# Patient Record
Sex: FEMALE | Race: WHITE | NOT HISPANIC OR LATINO
[De-identification: names, ages, dates, MRNs, and addresses within clinical notes are randomized per-mention and may not be internally consistent; named-entity substitution may affect disease eponyms.]

---

## 2017-01-27 ENCOUNTER — RECORD ABSTRACTING (OUTPATIENT)
Age: 77
End: 2017-01-27

## 2017-01-27 DIAGNOSIS — Z98.890 OTHER SPECIFIED POSTPROCEDURAL STATES: ICD-10-CM

## 2017-01-27 DIAGNOSIS — Z84.0 FAMILY HISTORY OF DISEASES OF THE SKIN AND SUBCUTANEOUS TISSUE: ICD-10-CM

## 2017-01-27 DIAGNOSIS — L30.1 DYSHIDROSIS [POMPHOLYX]: ICD-10-CM

## 2017-01-27 DIAGNOSIS — Z78.9 OTHER SPECIFIED HEALTH STATUS: ICD-10-CM

## 2017-02-27 ENCOUNTER — APPOINTMENT (OUTPATIENT)
Dept: DERMATOLOGY | Facility: CLINIC | Age: 77
End: 2017-02-27

## 2017-03-15 ENCOUNTER — RECORD ABSTRACTING (OUTPATIENT)
Age: 77
End: 2017-03-15

## 2017-03-15 DIAGNOSIS — Z84.89 FAMILY HISTORY OF OTHER SPECIFIED CONDITIONS: ICD-10-CM

## 2017-03-15 DIAGNOSIS — Z80.8 FAMILY HISTORY OF MALIGNANT NEOPLASM OF OTHER ORGANS OR SYSTEMS: ICD-10-CM

## 2017-03-31 ENCOUNTER — APPOINTMENT (OUTPATIENT)
Dept: INTERNAL MEDICINE | Facility: CLINIC | Age: 77
End: 2017-03-31

## 2017-03-31 VITALS
WEIGHT: 160 LBS | OXYGEN SATURATION: 98 % | HEART RATE: 64 BPM | SYSTOLIC BLOOD PRESSURE: 132 MMHG | RESPIRATION RATE: 18 BRPM | BODY MASS INDEX: 24.25 KG/M2 | DIASTOLIC BLOOD PRESSURE: 76 MMHG | TEMPERATURE: 97.5 F | HEIGHT: 68 IN

## 2017-10-16 ENCOUNTER — OTHER (OUTPATIENT)
Age: 77
End: 2017-10-16

## 2017-10-20 ENCOUNTER — NON-APPOINTMENT (OUTPATIENT)
Age: 77
End: 2017-10-20

## 2017-10-20 ENCOUNTER — APPOINTMENT (OUTPATIENT)
Dept: INTERNAL MEDICINE | Facility: CLINIC | Age: 77
End: 2017-10-20
Payer: MEDICARE

## 2017-10-20 VITALS
RESPIRATION RATE: 16 BRPM | WEIGHT: 150 LBS | OXYGEN SATURATION: 96 % | HEART RATE: 60 BPM | HEIGHT: 68 IN | BODY MASS INDEX: 22.73 KG/M2 | TEMPERATURE: 98.5 F | SYSTOLIC BLOOD PRESSURE: 124 MMHG | DIASTOLIC BLOOD PRESSURE: 70 MMHG

## 2017-10-20 PROCEDURE — 94010 BREATHING CAPACITY TEST: CPT

## 2017-10-20 PROCEDURE — 99214 OFFICE O/P EST MOD 30 MIN: CPT | Mod: 25

## 2017-12-05 LAB
CHOLEST SERPL-MCNC: 210
GLUCOSE SERPL-MCNC: 110
HBA1C MFR BLD HPLC: 6.3
HDLC SERPL-MCNC: 75
LDLC SERPL CALC-MCNC: 124

## 2018-01-08 ENCOUNTER — OUTPATIENT (OUTPATIENT)
Dept: OUTPATIENT SERVICES | Facility: HOSPITAL | Age: 78
LOS: 1 days | Discharge: HOME | End: 2018-01-08

## 2018-01-08 VITALS
HEART RATE: 56 BPM | TEMPERATURE: 99 F | RESPIRATION RATE: 16 BRPM | HEIGHT: 68 IN | OXYGEN SATURATION: 100 % | WEIGHT: 158.29 LBS | DIASTOLIC BLOOD PRESSURE: 65 MMHG | SYSTOLIC BLOOD PRESSURE: 165 MMHG

## 2018-01-08 DIAGNOSIS — Z98.890 OTHER SPECIFIED POSTPROCEDURAL STATES: Chronic | ICD-10-CM

## 2018-01-08 DIAGNOSIS — M13.812 OTHER SPECIFIED ARTHRITIS, LEFT SHOULDER: ICD-10-CM

## 2018-01-08 DIAGNOSIS — Z41.9 ENCOUNTER FOR PROCEDURE FOR PURPOSES OTHER THAN REMEDYING HEALTH STATE, UNSPECIFIED: Chronic | ICD-10-CM

## 2018-01-08 DIAGNOSIS — Z01.818 ENCOUNTER FOR OTHER PREPROCEDURAL EXAMINATION: ICD-10-CM

## 2018-01-08 DIAGNOSIS — M75.42 IMPINGEMENT SYNDROME OF LEFT SHOULDER: ICD-10-CM

## 2018-01-08 DIAGNOSIS — Z90.89 ACQUIRED ABSENCE OF OTHER ORGANS: Chronic | ICD-10-CM

## 2018-01-08 LAB
ANION GAP SERPL CALC-SCNC: 6 MMOL/L — SIGNIFICANT CHANGE UP (ref 5–17)
APTT BLD: 30.4 SEC — SIGNIFICANT CHANGE UP (ref 27.5–37.4)
BASOPHILS # BLD AUTO: 0.1 K/UL — SIGNIFICANT CHANGE UP (ref 0–0.2)
BASOPHILS NFR BLD AUTO: 1.2 % — SIGNIFICANT CHANGE UP (ref 0–2)
BUN SERPL-MCNC: 18 MG/DL — SIGNIFICANT CHANGE UP (ref 7–23)
CALCIUM SERPL-MCNC: 9.3 MG/DL — SIGNIFICANT CHANGE UP (ref 8.5–10.1)
CHLORIDE SERPL-SCNC: 108 MMOL/L — SIGNIFICANT CHANGE UP (ref 96–108)
CO2 SERPL-SCNC: 28 MMOL/L — SIGNIFICANT CHANGE UP (ref 22–31)
CREAT SERPL-MCNC: 0.77 MG/DL — SIGNIFICANT CHANGE UP (ref 0.5–1.3)
EOSINOPHIL # BLD AUTO: 0.2 K/UL — SIGNIFICANT CHANGE UP (ref 0–0.5)
EOSINOPHIL NFR BLD AUTO: 2.3 % — SIGNIFICANT CHANGE UP (ref 0–6)
GLUCOSE SERPL-MCNC: 102 MG/DL — HIGH (ref 70–99)
HCT VFR BLD CALC: 42.7 % — SIGNIFICANT CHANGE UP (ref 34.5–45)
HGB BLD-MCNC: 13.7 G/DL — SIGNIFICANT CHANGE UP (ref 11.5–15.5)
INR BLD: 0.95 RATIO — SIGNIFICANT CHANGE UP (ref 0.88–1.16)
LYMPHOCYTES # BLD AUTO: 1.3 K/UL — SIGNIFICANT CHANGE UP (ref 1–3.3)
LYMPHOCYTES # BLD AUTO: 19.7 % — SIGNIFICANT CHANGE UP (ref 13–44)
MCHC RBC-ENTMCNC: 29.4 PG — SIGNIFICANT CHANGE UP (ref 27–34)
MCHC RBC-ENTMCNC: 32.2 GM/DL — SIGNIFICANT CHANGE UP (ref 32–36)
MCV RBC AUTO: 91.5 FL — SIGNIFICANT CHANGE UP (ref 80–100)
MONOCYTES # BLD AUTO: 0.4 K/UL — SIGNIFICANT CHANGE UP (ref 0–0.9)
MONOCYTES NFR BLD AUTO: 5.4 % — SIGNIFICANT CHANGE UP (ref 2–14)
NEUTROPHILS # BLD AUTO: 4.9 K/UL — SIGNIFICANT CHANGE UP (ref 1.8–7.4)
NEUTROPHILS NFR BLD AUTO: 71.4 % — SIGNIFICANT CHANGE UP (ref 43–77)
PLATELET # BLD AUTO: 319 K/UL — SIGNIFICANT CHANGE UP (ref 150–400)
POTASSIUM SERPL-MCNC: 4 MMOL/L — SIGNIFICANT CHANGE UP (ref 3.5–5.3)
POTASSIUM SERPL-SCNC: 4 MMOL/L — SIGNIFICANT CHANGE UP (ref 3.5–5.3)
PROTHROM AB SERPL-ACNC: 10.2 SEC — SIGNIFICANT CHANGE UP (ref 9.8–12.7)
RBC # BLD: 4.67 M/UL — SIGNIFICANT CHANGE UP (ref 3.8–5.2)
RBC # FLD: 13.6 % — SIGNIFICANT CHANGE UP (ref 10.3–14.5)
SODIUM SERPL-SCNC: 142 MMOL/L — SIGNIFICANT CHANGE UP (ref 135–145)
WBC # BLD: 6.9 K/UL — SIGNIFICANT CHANGE UP (ref 3.8–10.5)
WBC # FLD AUTO: 6.9 K/UL — SIGNIFICANT CHANGE UP (ref 3.8–10.5)

## 2018-01-08 NOTE — H&P PST ADULT - FAMILY HISTORY
Father  Still living? No  Family history of prostate cancer in father, Age at diagnosis: Age Unknown     Mother  Still living? No  Family history of glioblastoma, Age at diagnosis: Age Unknown     Sibling  Still living? Yes, Estimated age: Age Unknown  Family history of glioblastoma, Age at diagnosis: Age Unknown  Family history of coronary artery disease, Age at diagnosis: Age Unknown  Family history of diabetes mellitus (DM), Age at diagnosis: Age Unknown

## 2018-01-08 NOTE — H&P PST ADULT - ASSESSMENT
This is a 78 y/o F scheduled for Left shoulder arthroscoopy decomensation, exc. distal clavicle with Dr. Melchor Phelps III on 1/19/18    1. Labs as per surgeon  2. EKG on chart from 11/2017  3. Medical clearance with Dr. Lundberg 1/12/18  4. discussed EZ sponges, pre-op & day of procedure instructions This is a 76 y/o F scheduled for Left shoulder arthroscoopy decomensation, exc. distal clavicle with Dr. Melchor Phelps III on 1/19/18    1. Labs as per surgeon  2. EKG on chart from 11/2017  3. Medical clearance with Dr. Lundberg  (/65 today, will f/u with Dr. Lundberg friday 1/12/18 for repeat BP check w/ medical clearance)  4. discussed EZ sponges, pre-op & day of procedure instructions

## 2018-01-08 NOTE — H&P PST ADULT - PMH
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

## 2018-01-08 NOTE — H&P PST ADULT - HISTORY OF PRESENT ILLNESS
This is a 78 y/o F Left shoulder pain x 1 year, completed PT for several weeks which cause more pain then steroid injection x 2 last one 11/2017 with minimal relief. Denies CP, fever, SOB, dizziness, LE edema. This is a 78 y/o F with PMH arthritis, recurrent bronchitis, chronic rhinitis, eczema, glaucoma, congenital kidney anomaly, low back pain, urinary frequency, Left shoulder pain x 1 year. Report pain to be intermittent, worse with certain positional changes that she can not specify, describes pain as sharp and rates it at 6 out of 10 when its at the worst, completed PT for several weeks which worsened her pain, then had steroid injections x 2 (last being 11/2017) with minimal relief. Denies joint instability, joint swelling, CP, fever, SOB, dizziness, LE edema.

## 2018-01-09 ENCOUNTER — OTHER (OUTPATIENT)
Age: 78
End: 2018-01-09

## 2018-01-10 LAB
GLUCOSE SERPL-MCNC: 102
INR PPP: 0.95
PT BLD: 10.2

## 2018-01-12 ENCOUNTER — NON-APPOINTMENT (OUTPATIENT)
Age: 78
End: 2018-01-12

## 2018-01-12 ENCOUNTER — APPOINTMENT (OUTPATIENT)
Dept: INTERNAL MEDICINE | Facility: CLINIC | Age: 78
End: 2018-01-12
Payer: MEDICARE

## 2018-01-12 VITALS
HEART RATE: 60 BPM | WEIGHT: 157.98 LBS | HEIGHT: 68 IN | OXYGEN SATURATION: 97 % | DIASTOLIC BLOOD PRESSURE: 74 MMHG | TEMPERATURE: 98.9 F | RESPIRATION RATE: 16 BRPM | BODY MASS INDEX: 23.94 KG/M2 | SYSTOLIC BLOOD PRESSURE: 110 MMHG

## 2018-01-12 DIAGNOSIS — M26.602 LEFT TEMPOROMANDIBULAR JOINT DISORDER,: ICD-10-CM

## 2018-01-12 DIAGNOSIS — H52.4 PRESBYOPIA: ICD-10-CM

## 2018-01-12 DIAGNOSIS — J30.1 ALLERGIC RHINITIS DUE TO POLLEN: ICD-10-CM

## 2018-01-12 DIAGNOSIS — R79.89 OTHER SPECIFIED ABNORMAL FINDINGS OF BLOOD CHEMISTRY: ICD-10-CM

## 2018-01-12 DIAGNOSIS — I38 ENDOCARDITIS, VALVE UNSPECIFIED: ICD-10-CM

## 2018-01-12 DIAGNOSIS — Q63.1 LOBULATED, FUSED AND HORSESHOE KIDNEY: ICD-10-CM

## 2018-01-12 PROCEDURE — 94060 EVALUATION OF WHEEZING: CPT

## 2018-01-12 PROCEDURE — 99214 OFFICE O/P EST MOD 30 MIN: CPT | Mod: 25

## 2018-01-12 RX ORDER — TRAVOPROST 0.04 MG/ML
SOLUTION/ DROPS OPHTHALMIC
Refills: 0 | Status: COMPLETED | COMMUNITY
End: 2018-01-12

## 2018-01-12 RX ORDER — PNV NO.95/FERROUS FUM/FOLIC AC 28MG-0.8MG
100 TABLET ORAL
Refills: 0 | Status: COMPLETED | COMMUNITY
End: 2018-01-12

## 2018-01-12 RX ORDER — UBIDECARENONE/VIT E ACET 100MG-5
50 MCG CAPSULE ORAL
Refills: 0 | Status: COMPLETED | COMMUNITY
End: 2018-01-12

## 2018-01-18 ENCOUNTER — APPOINTMENT (OUTPATIENT)
Dept: INTERNAL MEDICINE | Facility: CLINIC | Age: 78
End: 2018-01-18
Payer: MEDICARE

## 2018-01-18 ENCOUNTER — RESULT CHARGE (OUTPATIENT)
Age: 78
End: 2018-01-18

## 2018-01-18 ENCOUNTER — NON-APPOINTMENT (OUTPATIENT)
Age: 78
End: 2018-01-18

## 2018-01-18 VITALS
DIASTOLIC BLOOD PRESSURE: 80 MMHG | WEIGHT: 154.98 LBS | HEIGHT: 68 IN | TEMPERATURE: 99.7 F | OXYGEN SATURATION: 97 % | BODY MASS INDEX: 23.49 KG/M2 | SYSTOLIC BLOOD PRESSURE: 130 MMHG | HEART RATE: 80 BPM | RESPIRATION RATE: 16 BRPM

## 2018-01-18 LAB
FLUAV SPEC QL CULT: NEGATIVE
FLUBV AG SPEC QL IA: NEGATIVE

## 2018-01-18 PROCEDURE — 87804 INFLUENZA ASSAY W/OPTIC: CPT | Mod: QW

## 2018-01-18 PROCEDURE — 94060 EVALUATION OF WHEEZING: CPT

## 2018-01-18 PROCEDURE — 99213 OFFICE O/P EST LOW 20 MIN: CPT | Mod: 25

## 2018-01-19 RX ORDER — OXYCODONE AND ACETAMINOPHEN 5; 325 MG/1; MG/1
2 TABLET ORAL EVERY 6 HOURS
Qty: 0 | Refills: 0 | Status: DISCONTINUED | OUTPATIENT
Start: 2018-03-23 | End: 2018-03-23

## 2018-01-19 RX ORDER — OXYCODONE AND ACETAMINOPHEN 5; 325 MG/1; MG/1
1 TABLET ORAL EVERY 4 HOURS
Qty: 0 | Refills: 0 | Status: DISCONTINUED | OUTPATIENT
Start: 2018-03-23 | End: 2018-03-23

## 2018-01-19 RX ORDER — IBUPROFEN 200 MG
400 TABLET ORAL EVERY 8 HOURS
Qty: 0 | Refills: 0 | Status: DISCONTINUED | OUTPATIENT
Start: 2018-03-23 | End: 2018-04-07

## 2018-01-19 NOTE — ASU DISCHARGE PLAN (ADULT/PEDIATRIC). - NOTIFY
Swelling that continues/Numbness, color, or temperature change to extremity/Pain not relieved by Medications/Fever greater than 101/Numbness, tingling/Bleeding that does not stop

## 2018-01-19 NOTE — ASU DISCHARGE PLAN (ADULT/PEDIATRIC). - MEDICATION SUMMARY - MEDICATIONS TO TAKE
I will START or STAY ON the medications listed below when I get home from the hospital:    loratadine 10 mg oral tablet  -- 1 tab(s) by mouth once a day  -- Indication: For home med    pseudoephedrine  -- 1 tab(s) by mouth once a day  -- Indication: For home med    dorzolamide-timolol 2.23%-0.68% ophthalmic solution  -- 1 drop(s) in each eye 2 times a day  -- Indication: For home med    Daily Multi oral tablet  -- 1 tab(s) by mouth once a day  -- Indication: For home med    Vitamin C  -- 1 cap(s) by mouth once a day  -- Indication: For home med    Vitamin B-12  -- 1 dose(s) by mouth once a day  -- Indication: For home med

## 2018-01-23 ENCOUNTER — MEDICATION RENEWAL (OUTPATIENT)
Age: 78
End: 2018-01-23

## 2018-02-01 ENCOUNTER — APPOINTMENT (OUTPATIENT)
Dept: INTERNAL MEDICINE | Facility: CLINIC | Age: 78
End: 2018-02-01
Payer: MEDICARE

## 2018-02-01 ENCOUNTER — NON-APPOINTMENT (OUTPATIENT)
Age: 78
End: 2018-02-01

## 2018-02-01 VITALS
RESPIRATION RATE: 18 BRPM | WEIGHT: 158 LBS | HEIGHT: 68 IN | OXYGEN SATURATION: 97 % | DIASTOLIC BLOOD PRESSURE: 78 MMHG | BODY MASS INDEX: 23.95 KG/M2 | TEMPERATURE: 98.7 F | HEART RATE: 70 BPM | SYSTOLIC BLOOD PRESSURE: 126 MMHG

## 2018-02-01 PROCEDURE — 99214 OFFICE O/P EST MOD 30 MIN: CPT | Mod: 25

## 2018-02-01 PROCEDURE — 94060 EVALUATION OF WHEEZING: CPT

## 2018-02-01 RX ORDER — CEFUROXIME AXETIL 500 MG/1
500 TABLET ORAL
Qty: 14 | Refills: 0 | Status: COMPLETED | COMMUNITY
Start: 2018-02-01 | End: 2018-02-08

## 2018-02-01 RX ORDER — PREDNISONE 20 MG/1
20 TABLET ORAL
Qty: 7 | Refills: 0 | Status: DISCONTINUED | COMMUNITY
Start: 2018-01-21 | End: 2018-02-01

## 2018-02-01 RX ORDER — AZITHROMYCIN DIHYDRATE 250 MG/1
250 TABLET, FILM COATED ORAL
Qty: 6 | Refills: 0 | Status: COMPLETED | COMMUNITY
Start: 2018-01-18 | End: 2018-02-01

## 2018-02-05 LAB
ALBUMIN SERPL ELPH-MCNC: 4 G/DL
ALP BLD-CCNC: 81 U/L
ALT SERPL-CCNC: 22 U/L
ANION GAP SERPL CALC-SCNC: 13 MMOL/L
AST SERPL-CCNC: 14 U/L
BASOPHILS # BLD AUTO: 0.02 K/UL
BASOPHILS NFR BLD AUTO: 0.3 %
BILIRUB SERPL-MCNC: <0.2 MG/DL
BUN SERPL-MCNC: 17 MG/DL
CALCIUM SERPL-MCNC: 9.5 MG/DL
CHLORIDE SERPL-SCNC: 102 MMOL/L
CO2 SERPL-SCNC: 24 MMOL/L
CREAT SERPL-MCNC: 0.92 MG/DL
EOSINOPHIL # BLD AUTO: 0.11 K/UL
EOSINOPHIL NFR BLD AUTO: 1.4 %
GLUCOSE SERPL-MCNC: 128 MG/DL
HCT VFR BLD CALC: 39.4 %
HGB BLD-MCNC: 12.3 G/DL
IMM GRANULOCYTES NFR BLD AUTO: 0.5 %
LYMPHOCYTES # BLD AUTO: 1.86 K/UL
LYMPHOCYTES NFR BLD AUTO: 23.7 %
MAN DIFF?: NORMAL
MCHC RBC-ENTMCNC: 28.6 PG
MCHC RBC-ENTMCNC: 31.2 GM/DL
MCV RBC AUTO: 91.6 FL
MONOCYTES # BLD AUTO: 0.68 K/UL
MONOCYTES NFR BLD AUTO: 8.7 %
NEUTROPHILS # BLD AUTO: 5.14 K/UL
NEUTROPHILS NFR BLD AUTO: 65.4 %
PLATELET # BLD AUTO: 357 K/UL
POTASSIUM SERPL-SCNC: 4.4 MMOL/L
PROT SERPL-MCNC: 6.5 G/DL
RBC # BLD: 4.3 M/UL
RBC # FLD: 14.8 %
SODIUM SERPL-SCNC: 139 MMOL/L
WBC # FLD AUTO: 7.85 K/UL

## 2018-03-12 ENCOUNTER — OUTPATIENT (OUTPATIENT)
Dept: OUTPATIENT SERVICES | Facility: HOSPITAL | Age: 78
LOS: 1 days | Discharge: HOME | End: 2018-03-12
Payer: MEDICARE

## 2018-03-12 VITALS
OXYGEN SATURATION: 100 % | WEIGHT: 156.97 LBS | TEMPERATURE: 98 F | HEIGHT: 67 IN | SYSTOLIC BLOOD PRESSURE: 151 MMHG | HEART RATE: 59 BPM | RESPIRATION RATE: 14 BRPM | DIASTOLIC BLOOD PRESSURE: 73 MMHG

## 2018-03-12 DIAGNOSIS — M94.212 CHONDROMALACIA, LEFT SHOULDER: ICD-10-CM

## 2018-03-12 DIAGNOSIS — Z90.89 ACQUIRED ABSENCE OF OTHER ORGANS: Chronic | ICD-10-CM

## 2018-03-12 DIAGNOSIS — M13.812 OTHER SPECIFIED ARTHRITIS, LEFT SHOULDER: ICD-10-CM

## 2018-03-12 DIAGNOSIS — M75.42 IMPINGEMENT SYNDROME OF LEFT SHOULDER: ICD-10-CM

## 2018-03-12 DIAGNOSIS — M75.82 OTHER SHOULDER LESIONS, LEFT SHOULDER: ICD-10-CM

## 2018-03-12 DIAGNOSIS — M19.012 PRIMARY OSTEOARTHRITIS, LEFT SHOULDER: ICD-10-CM

## 2018-03-12 DIAGNOSIS — Z98.890 OTHER SPECIFIED POSTPROCEDURAL STATES: Chronic | ICD-10-CM

## 2018-03-12 DIAGNOSIS — E78.5 HYPERLIPIDEMIA, UNSPECIFIED: ICD-10-CM

## 2018-03-12 DIAGNOSIS — L30.9 DERMATITIS, UNSPECIFIED: ICD-10-CM

## 2018-03-12 DIAGNOSIS — Z41.9 ENCOUNTER FOR PROCEDURE FOR PURPOSES OTHER THAN REMEDYING HEALTH STATE, UNSPECIFIED: Chronic | ICD-10-CM

## 2018-03-12 DIAGNOSIS — Z01.818 ENCOUNTER FOR OTHER PREPROCEDURAL EXAMINATION: ICD-10-CM

## 2018-03-12 DIAGNOSIS — J30.9 ALLERGIC RHINITIS, UNSPECIFIED: ICD-10-CM

## 2018-03-12 LAB
ANION GAP SERPL CALC-SCNC: 7 MMOL/L — SIGNIFICANT CHANGE UP (ref 5–17)
APTT BLD: 30.1 SEC — SIGNIFICANT CHANGE UP (ref 27.5–37.4)
BASOPHILS # BLD AUTO: 0.06 K/UL — SIGNIFICANT CHANGE UP (ref 0–0.2)
BASOPHILS NFR BLD AUTO: 1 % — SIGNIFICANT CHANGE UP (ref 0–2)
BUN SERPL-MCNC: 20 MG/DL — SIGNIFICANT CHANGE UP (ref 7–23)
CALCIUM SERPL-MCNC: 9.3 MG/DL — SIGNIFICANT CHANGE UP (ref 8.5–10.1)
CHLORIDE SERPL-SCNC: 106 MMOL/L — SIGNIFICANT CHANGE UP (ref 96–108)
CO2 SERPL-SCNC: 27 MMOL/L — SIGNIFICANT CHANGE UP (ref 22–31)
CREAT SERPL-MCNC: 1 MG/DL — SIGNIFICANT CHANGE UP (ref 0.5–1.3)
EOSINOPHIL # BLD AUTO: 0.12 K/UL — SIGNIFICANT CHANGE UP (ref 0–0.5)
EOSINOPHIL NFR BLD AUTO: 1.9 % — SIGNIFICANT CHANGE UP (ref 0–6)
GLUCOSE SERPL-MCNC: 100 MG/DL — HIGH (ref 70–99)
HCT VFR BLD CALC: 42.1 % — SIGNIFICANT CHANGE UP (ref 34.5–45)
HGB BLD-MCNC: 13.5 G/DL — SIGNIFICANT CHANGE UP (ref 11.5–15.5)
IMM GRANULOCYTES NFR BLD AUTO: 0.3 % — SIGNIFICANT CHANGE UP (ref 0–1.5)
INR BLD: 1 RATIO — SIGNIFICANT CHANGE UP (ref 0.88–1.16)
LYMPHOCYTES # BLD AUTO: 1.24 K/UL — SIGNIFICANT CHANGE UP (ref 1–3.3)
LYMPHOCYTES # BLD AUTO: 20 % — SIGNIFICANT CHANGE UP (ref 13–44)
MCHC RBC-ENTMCNC: 29.2 PG — SIGNIFICANT CHANGE UP (ref 27–34)
MCHC RBC-ENTMCNC: 32.1 GM/DL — SIGNIFICANT CHANGE UP (ref 32–36)
MCV RBC AUTO: 91.1 FL — SIGNIFICANT CHANGE UP (ref 80–100)
MONOCYTES # BLD AUTO: 0.39 K/UL — SIGNIFICANT CHANGE UP (ref 0–0.9)
MONOCYTES NFR BLD AUTO: 6.3 % — SIGNIFICANT CHANGE UP (ref 2–14)
NEUTROPHILS # BLD AUTO: 4.38 K/UL — SIGNIFICANT CHANGE UP (ref 1.8–7.4)
NEUTROPHILS NFR BLD AUTO: 70.5 % — SIGNIFICANT CHANGE UP (ref 43–77)
PLATELET # BLD AUTO: 291 K/UL — SIGNIFICANT CHANGE UP (ref 150–400)
POTASSIUM SERPL-MCNC: 4.7 MMOL/L — SIGNIFICANT CHANGE UP (ref 3.5–5.3)
POTASSIUM SERPL-SCNC: 4.7 MMOL/L — SIGNIFICANT CHANGE UP (ref 3.5–5.3)
PROTHROM AB SERPL-ACNC: 10.8 SEC — SIGNIFICANT CHANGE UP (ref 9.8–12.7)
RBC # BLD: 4.62 M/UL — SIGNIFICANT CHANGE UP (ref 3.8–5.2)
RBC # FLD: 14.7 % — HIGH (ref 10.3–14.5)
SODIUM SERPL-SCNC: 140 MMOL/L — SIGNIFICANT CHANGE UP (ref 135–145)
WBC # BLD: 6.21 K/UL — SIGNIFICANT CHANGE UP (ref 3.8–10.5)
WBC # FLD AUTO: 6.21 K/UL — SIGNIFICANT CHANGE UP (ref 3.8–10.5)

## 2018-03-12 PROCEDURE — 93010 ELECTROCARDIOGRAM REPORT: CPT

## 2018-03-12 NOTE — H&P PST ADULT - PSH
Elective surgery  "vaginal tissue biopsy" 2000  H/O colonoscopy  last done 2008?  History of tonsillectomy  as a child

## 2018-03-12 NOTE — H&P PST ADULT - ASSESSMENT
yo scheduled for   with    on     1. Labs as per surgeon  2. EKG  3. Medical clearance with  4. discussed EZ sponges & day of procedure instructions 76 yo female scheduled for left shoulder arthroscopy with Dr. Phelps on 3/23/18    1. Labs as per surgeon  2. EKG  3. Medical clearance with PCP Dr Peterson  4. discussed EZ sponges & day of procedure instructions

## 2018-03-12 NOTE — H&P PST ADULT - PMH
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

## 2018-03-12 NOTE — H&P PST ADULT - HISTORY OF PRESENT ILLNESS
76 yo female presents to PST. Reports coming in for PST Jan 2018 for left shoulder surgery but surgery was cancelled due to "chest congestion". Denies fevers, cough or chest pain at this time. c/o left shoulder pain with "certain movements". Reports left shoulder pain 8/10 at times. Denies taking pain medication.

## 2018-03-16 ENCOUNTER — APPOINTMENT (OUTPATIENT)
Dept: INTERNAL MEDICINE | Facility: CLINIC | Age: 78
End: 2018-03-16
Payer: MEDICARE

## 2018-03-16 VITALS
OXYGEN SATURATION: 98 % | RESPIRATION RATE: 16 BRPM | BODY MASS INDEX: 23.95 KG/M2 | HEART RATE: 66 BPM | HEIGHT: 68 IN | SYSTOLIC BLOOD PRESSURE: 108 MMHG | DIASTOLIC BLOOD PRESSURE: 66 MMHG | WEIGHT: 158 LBS | TEMPERATURE: 97.4 F

## 2018-03-16 DIAGNOSIS — R05 COUGH: ICD-10-CM

## 2018-03-16 DIAGNOSIS — J40 BRONCHITIS, NOT SPECIFIED AS ACUTE OR CHRONIC: ICD-10-CM

## 2018-03-16 DIAGNOSIS — R41.3 OTHER AMNESIA: ICD-10-CM

## 2018-03-16 DIAGNOSIS — J45.20 MILD INTERMITTENT ASTHMA, UNCOMPLICATED: ICD-10-CM

## 2018-03-16 DIAGNOSIS — J06.9 ACUTE UPPER RESPIRATORY INFECTION, UNSPECIFIED: ICD-10-CM

## 2018-03-16 PROCEDURE — 99214 OFFICE O/P EST MOD 30 MIN: CPT | Mod: 25

## 2018-03-16 PROCEDURE — 94060 EVALUATION OF WHEEZING: CPT

## 2018-03-16 RX ORDER — METHYLPREDNISOLONE 4 MG/1
4 TABLET ORAL
Qty: 1 | Refills: 0 | Status: COMPLETED | COMMUNITY
Start: 2018-02-01 | End: 2018-03-16

## 2018-03-16 RX ORDER — CODEINE PHOSPHATE AND GUAIFENESIN 10; 100 MG/5ML; MG/5ML
100-10 LIQUID ORAL
Qty: 240 | Refills: 0 | Status: COMPLETED | COMMUNITY
Start: 2018-01-23 | End: 2018-03-16

## 2018-03-16 RX ORDER — LORATADINE 10 MG/1
TABLET ORAL
Refills: 0 | Status: COMPLETED | COMMUNITY
End: 2018-03-16

## 2018-03-16 RX ORDER — BENZONATATE 100 MG/1
100 CAPSULE ORAL
Qty: 60 | Refills: 0 | Status: COMPLETED | COMMUNITY
Start: 2018-01-18 | End: 2018-03-16

## 2018-03-22 RX ORDER — OXYCODONE HYDROCHLORIDE 5 MG/1
10 TABLET ORAL EVERY 6 HOURS
Qty: 0 | Refills: 0 | Status: DISCONTINUED | OUTPATIENT
Start: 2018-03-23 | End: 2018-03-23

## 2018-03-22 RX ORDER — SODIUM CHLORIDE 9 MG/ML
1000 INJECTION, SOLUTION INTRAVENOUS
Qty: 0 | Refills: 0 | Status: DISCONTINUED | OUTPATIENT
Start: 2018-03-23 | End: 2018-04-07

## 2018-03-22 RX ORDER — FENTANYL CITRATE 50 UG/ML
50 INJECTION INTRAVENOUS
Qty: 0 | Refills: 0 | Status: DISCONTINUED | OUTPATIENT
Start: 2018-03-23 | End: 2018-03-23

## 2018-03-22 RX ORDER — OXYCODONE HYDROCHLORIDE 5 MG/1
5 TABLET ORAL EVERY 4 HOURS
Qty: 0 | Refills: 0 | Status: DISCONTINUED | OUTPATIENT
Start: 2018-03-23 | End: 2018-03-23

## 2018-03-22 RX ORDER — ONDANSETRON 8 MG/1
4 TABLET, FILM COATED ORAL ONCE
Qty: 0 | Refills: 0 | Status: DISCONTINUED | OUTPATIENT
Start: 2018-03-23 | End: 2018-04-07

## 2018-03-23 ENCOUNTER — RESULT REVIEW (OUTPATIENT)
Age: 78
End: 2018-03-23

## 2018-03-23 ENCOUNTER — OUTPATIENT (OUTPATIENT)
Dept: OUTPATIENT SERVICES | Facility: HOSPITAL | Age: 78
LOS: 1 days | Discharge: HOME | End: 2018-03-23
Payer: MEDICARE

## 2018-03-23 VITALS
HEART RATE: 63 BPM | WEIGHT: 156.09 LBS | HEIGHT: 68 IN | RESPIRATION RATE: 16 BRPM | OXYGEN SATURATION: 99 % | DIASTOLIC BLOOD PRESSURE: 77 MMHG | SYSTOLIC BLOOD PRESSURE: 150 MMHG | TEMPERATURE: 99 F

## 2018-03-23 VITALS
RESPIRATION RATE: 16 BRPM | OXYGEN SATURATION: 99 % | SYSTOLIC BLOOD PRESSURE: 147 MMHG | DIASTOLIC BLOOD PRESSURE: 79 MMHG | HEART RATE: 73 BPM | TEMPERATURE: 98 F

## 2018-03-23 DIAGNOSIS — Z90.89 ACQUIRED ABSENCE OF OTHER ORGANS: Chronic | ICD-10-CM

## 2018-03-23 DIAGNOSIS — Z98.890 OTHER SPECIFIED POSTPROCEDURAL STATES: Chronic | ICD-10-CM

## 2018-03-23 DIAGNOSIS — Z41.9 ENCOUNTER FOR PROCEDURE FOR PURPOSES OTHER THAN REMEDYING HEALTH STATE, UNSPECIFIED: Chronic | ICD-10-CM

## 2018-03-23 PROCEDURE — 88304 TISSUE EXAM BY PATHOLOGIST: CPT | Mod: 26

## 2018-03-23 NOTE — BRIEF OPERATIVE NOTE - ELECTIVE PROCEDURE
Verified Results  (1) PT WITH INR [id number] 10:32AM Dino Starling Order Number: [id number]     Test Name Result Flag Reference   INR 2 56 H 0 86-1 16   PT 27 8 seconds H 12 1-14 4 Yes

## 2018-03-23 NOTE — BRIEF OPERATIVE NOTE - PROCEDURE
<<-----Click on this checkbox to enter Procedure Subacromial decompression  03/23/2018  left, with acromioplasty and shay  Active  [id number]

## 2018-03-27 LAB — SURGICAL PATHOLOGY FINAL REPORT - CH: SIGNIFICANT CHANGE UP

## 2018-03-28 DIAGNOSIS — M54.5 LOW BACK PAIN: ICD-10-CM

## 2018-03-28 DIAGNOSIS — Z83.3 FAMILY HISTORY OF DIABETES MELLITUS: ICD-10-CM

## 2018-03-28 DIAGNOSIS — R35.0 FREQUENCY OF MICTURITION: ICD-10-CM

## 2018-03-28 DIAGNOSIS — L30.9 DERMATITIS, UNSPECIFIED: ICD-10-CM

## 2018-03-28 DIAGNOSIS — M75.42 IMPINGEMENT SYNDROME OF LEFT SHOULDER: ICD-10-CM

## 2018-03-28 DIAGNOSIS — E78.5 HYPERLIPIDEMIA, UNSPECIFIED: ICD-10-CM

## 2018-03-28 DIAGNOSIS — Z82.49 FAMILY HISTORY OF ISCHEMIC HEART DISEASE AND OTHER DISEASES OF THE CIRCULATORY SYSTEM: ICD-10-CM

## 2018-03-28 DIAGNOSIS — M19.012 PRIMARY OSTEOARTHRITIS, LEFT SHOULDER: ICD-10-CM

## 2018-03-28 DIAGNOSIS — J30.9 ALLERGIC RHINITIS, UNSPECIFIED: ICD-10-CM

## 2018-03-28 DIAGNOSIS — M75.82 OTHER SHOULDER LESIONS, LEFT SHOULDER: ICD-10-CM

## 2018-03-28 DIAGNOSIS — M94.212 CHONDROMALACIA, LEFT SHOULDER: ICD-10-CM

## 2018-03-28 DIAGNOSIS — H40.9 UNSPECIFIED GLAUCOMA: ICD-10-CM

## 2018-03-28 DIAGNOSIS — N39.3 STRESS INCONTINENCE (FEMALE) (MALE): ICD-10-CM

## 2018-05-04 ENCOUNTER — APPOINTMENT (OUTPATIENT)
Dept: INTERNAL MEDICINE | Facility: CLINIC | Age: 78
End: 2018-05-04
Payer: MEDICARE

## 2018-05-04 ENCOUNTER — NON-APPOINTMENT (OUTPATIENT)
Age: 78
End: 2018-05-04

## 2018-05-04 VITALS
TEMPERATURE: 97.7 F | DIASTOLIC BLOOD PRESSURE: 76 MMHG | SYSTOLIC BLOOD PRESSURE: 132 MMHG | HEIGHT: 68 IN | RESPIRATION RATE: 18 BRPM | OXYGEN SATURATION: 97 % | BODY MASS INDEX: 23.64 KG/M2 | HEART RATE: 60 BPM | WEIGHT: 156 LBS

## 2018-05-04 PROCEDURE — 99214 OFFICE O/P EST MOD 30 MIN: CPT | Mod: 25

## 2018-05-04 PROCEDURE — 94010 BREATHING CAPACITY TEST: CPT

## 2018-07-16 PROBLEM — Q63.9 CONGENITAL MALFORMATION OF KIDNEY, UNSPECIFIED: Chronic | Status: INACTIVE | Noted: 2018-01-08 | Resolved: 2018-03-12

## 2018-07-16 PROBLEM — J40 BRONCHITIS, NOT SPECIFIED AS ACUTE OR CHRONIC: Chronic | Status: INACTIVE | Noted: 2018-01-08 | Resolved: 2018-03-12

## 2018-10-22 PROBLEM — M54.5 LOW BACK PAIN: Chronic | Status: ACTIVE | Noted: 2018-01-08

## 2018-10-22 PROBLEM — L30.9 DERMATITIS, UNSPECIFIED: Chronic | Status: ACTIVE | Noted: 2018-01-08

## 2018-10-22 PROBLEM — T80.40XA: Chronic | Status: ACTIVE | Noted: 2018-01-08

## 2018-10-22 PROBLEM — M25.512 PAIN IN LEFT SHOULDER: Chronic | Status: ACTIVE | Noted: 2018-03-12

## 2018-10-22 PROBLEM — J31.0 CHRONIC RHINITIS: Chronic | Status: ACTIVE | Noted: 2018-01-08

## 2018-10-22 PROBLEM — N39.3 STRESS INCONTINENCE (FEMALE) (MALE): Chronic | Status: ACTIVE | Noted: 2018-03-12

## 2018-10-22 PROBLEM — M19.90 UNSPECIFIED OSTEOARTHRITIS, UNSPECIFIED SITE: Chronic | Status: ACTIVE | Noted: 2018-01-08

## 2018-10-22 PROBLEM — H40.9 UNSPECIFIED GLAUCOMA: Chronic | Status: ACTIVE | Noted: 2018-01-08

## 2018-10-22 PROBLEM — R35.0 FREQUENCY OF MICTURITION: Chronic | Status: ACTIVE | Noted: 2018-01-08

## 2018-11-09 ENCOUNTER — APPOINTMENT (OUTPATIENT)
Dept: INTERNAL MEDICINE | Facility: CLINIC | Age: 78
End: 2018-11-09

## 2018-11-09 ENCOUNTER — APPOINTMENT (OUTPATIENT)
Dept: INTERNAL MEDICINE | Facility: CLINIC | Age: 78
End: 2018-11-09
Payer: MEDICARE

## 2018-11-09 VITALS
BODY MASS INDEX: 23.49 KG/M2 | HEIGHT: 68 IN | SYSTOLIC BLOOD PRESSURE: 134 MMHG | DIASTOLIC BLOOD PRESSURE: 82 MMHG | OXYGEN SATURATION: 97 % | WEIGHT: 155 LBS | TEMPERATURE: 97.9 F | RESPIRATION RATE: 16 BRPM | HEART RATE: 60 BPM

## 2018-11-09 DIAGNOSIS — H40.9 UNSPECIFIED GLAUCOMA: ICD-10-CM

## 2018-11-09 PROCEDURE — G0444 DEPRESSION SCREEN ANNUAL: CPT | Mod: 59

## 2018-11-09 PROCEDURE — 99214 OFFICE O/P EST MOD 30 MIN: CPT

## 2018-11-09 PROCEDURE — G0442 ANNUAL ALCOHOL SCREEN 15 MIN: CPT | Mod: 59

## 2018-11-09 NOTE — ASSESSMENT
[FreeTextEntry1] : #1 consider mild low back strain. Patient will was instructed on stretches and low back exercises. Return if not improving and resolving.\par \par #2 #2 seasonal allergies. loratidine prn.\par \par #3 history of borderline blood pressure readings. SHADY diet. recheck in future.\par \par Received flu vac for this season, 10/2018.\par \par RV 6 months.

## 2018-11-09 NOTE — HEALTH RISK ASSESSMENT
[] : Yes [0] : 2) Feeling down, depressed, or hopeless: Not at all (0) [[id number]] : 0 [[id number]] : 1 [MammogramComments] : to see gyn [PapSmearComments] : to see gyn [ColonoscopyDate] : 06/2011 [ColonoscopyComments] : to see GI

## 2018-11-09 NOTE — COUNSELING
[Healthy eating counseling provided] : healthy eating [Low Salt Diet] : Low salt diet [ - Annual Alcohol Misuse Screening] : Annual Alcohol Misuse Screening [ - Annual Depression Screening] : Annual Depression Screening

## 2018-11-09 NOTE — HISTORY OF PRESENT ILLNESS
[FreeTextEntry1] : RV 6 mos. [de-identified] : This is a pleasant 73-year-old female who returns for a following appointment. 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  she has some oocas mild right lower back pain in the lower right paralumbar area.\par \par She has received a flu vaccination for this season in October.\par \par She is a past history of borderline blood pressure readings and her blood pressure today here is 134/82.

## 2018-11-09 NOTE — PHYSICAL EXAM
[No Acute Distress] : no acute distress [Well Nourished] : well nourished [Well Developed] : well developed [Well-Appearing] : well-appearing [Normal Sclera/Conjunctiva] : normal sclera/conjunctiva [PERRL] : pupils equal round and reactive to light [EOMI] : extraocular movements intact [Normal Outer Ear/Nose] : the outer ears and nose were normal in appearance [Normal Oropharynx] : the oropharynx was normal [No JVD] : no jugular venous distention [Supple] : supple [No Lymphadenopathy] : no lymphadenopathy [Thyroid Normal, No Nodules] : the thyroid was normal and there were no nodules present [No Respiratory Distress] : no respiratory distress  [Clear to Auscultation] : lungs were clear to auscultation bilaterally [No Accessory Muscle Use] : no accessory muscle use [Normal Rate] : normal rate  [Regular Rhythm] : with a regular rhythm [Normal S1, S2] : normal S1 and S2 [No Edema] : there was no peripheral edema [No Extremity Clubbing/Cyanosis] : no extremity clubbing/cyanosis [Soft] : abdomen soft [Non Tender] : non-tender [Non-distended] : non-distended [No Masses] : no abdominal mass palpated [No HSM] : no HSM [Normal Bowel Sounds] : normal bowel sounds [Normal Anterior Cervical Nodes] : no anterior cervical lymphadenopathy [No Spinal Tenderness] : no spinal tenderness [No Rash] : no rash [Normal Gait] : normal gait [No Focal Deficits] : no focal deficits [Normal Affect] : the affect was normal [Normal Insight/Judgement] : insight and judgment were intact [de-identified] : mild inc tone paral sinal muscles

## 2019-01-20 DIAGNOSIS — Z86.2 PERSONAL HISTORY OF DISEASES OF THE BLOOD AND BLOOD-FORMING ORGANS AND CERTAIN DISORDERS INVOLVING THE IMMUNE MECHANISM: ICD-10-CM

## 2019-03-01 PROBLEM — Z86.2 HISTORY OF ANEMIA: Status: RESOLVED | Noted: 2019-03-01 | Resolved: 2019-03-01

## 2019-05-10 ENCOUNTER — APPOINTMENT (OUTPATIENT)
Dept: INTERNAL MEDICINE | Facility: CLINIC | Age: 79
End: 2019-05-10

## 2019-05-20 LAB — HBA1C MFR BLD HPLC: 6.3

## 2019-05-23 ENCOUNTER — APPOINTMENT (OUTPATIENT)
Dept: INTERNAL MEDICINE | Facility: CLINIC | Age: 79
End: 2019-05-23
Payer: MEDICARE

## 2019-05-23 VITALS
SYSTOLIC BLOOD PRESSURE: 154 MMHG | BODY MASS INDEX: 23.95 KG/M2 | OXYGEN SATURATION: 97 % | HEIGHT: 68 IN | TEMPERATURE: 97.8 F | RESPIRATION RATE: 18 BRPM | WEIGHT: 158 LBS | DIASTOLIC BLOOD PRESSURE: 80 MMHG | HEART RATE: 80 BPM

## 2019-05-23 VITALS — DIASTOLIC BLOOD PRESSURE: 84 MMHG | SYSTOLIC BLOOD PRESSURE: 154 MMHG

## 2019-05-23 PROCEDURE — 99214 OFFICE O/P EST MOD 30 MIN: CPT

## 2019-05-23 RX ORDER — TRIAMCINOLONE ACETONIDE 40 MG/ML
INJECTION, SUSPENSION INTRA-ARTICULAR; INTRAMUSCULAR
Refills: 0 | Status: DISCONTINUED | COMMUNITY
End: 2019-05-23

## 2019-05-23 RX ORDER — LIDOCAINE HYDROCHLORIDE 40 MG/ML
SOLUTION TOPICAL
Refills: 0 | Status: DISCONTINUED | COMMUNITY
End: 2019-05-23

## 2019-05-23 NOTE — HISTORY OF PRESENT ILLNESS
[FreeTextEntry1] : FU HLD/AR/ [de-identified] : She has been feeling somewhat stressed caring for her ill Step-Mother who is currently requiring 24 hr care.  She finds her eating habits are erratic due to her schedule.  Ice cream is her downfall.  She looks for comfort foods and notes few lb weight gain.  \par Her allergies have been well controlled with sudafed and loratidine qd.  She does have some PASCAL when walking on hills.  No chest pain.    \par

## 2019-05-23 NOTE — COUNSELING
[Weight management counseling provided] : Weight management [Healthy eating counseling provided] : healthy eating [Activity counseling provided] : activity [Low Salt Diet] : Low salt diet [___ min/wk activity recommended] : [unfilled] min/wk activity recommended

## 2019-05-23 NOTE — ASSESSMENT
[FreeTextEntry1] : \par Patient will discontinue Pseudoephedrine.  Start fluticasone NS 2 inh qd.  Continue Loratidine\par \par Long discussion regarding dx and treatment of HTN including lifestyle modifications with low Na diet, weight loss and exercise.  \par \par We had an extensive discussion regarding pre-diabetes and  Cardiovascular risks including statin therapy and importance of lifestyle modifications with a healthy diet and structured exercise regimen with goal >150 minutes most days of week. Risks and benefits of statins thoroughly reviewed. Pt declines use of Statin.  \par \par FU with  for Carotid Sono and Echo.  \par \par Discussed Shingrix vaccination.  Outlined benefits and risks and current recommendation.  \par RX provided for administration at local pharmacy.\par \par \par FU check BP 3 months.  \par \par

## 2019-05-23 NOTE — PHYSICAL EXAM

## 2019-07-16 ENCOUNTER — EMERGENCY (EMERGENCY)
Facility: HOSPITAL | Age: 79
LOS: 0 days | Discharge: HOME | End: 2019-07-16
Attending: EMERGENCY MEDICINE | Admitting: EMERGENCY MEDICINE
Payer: MEDICARE

## 2019-07-16 VITALS
WEIGHT: 154.98 LBS | HEIGHT: 68 IN | TEMPERATURE: 98 F | HEART RATE: 61 BPM | SYSTOLIC BLOOD PRESSURE: 177 MMHG | DIASTOLIC BLOOD PRESSURE: 94 MMHG | OXYGEN SATURATION: 97 % | RESPIRATION RATE: 18 BRPM

## 2019-07-16 DIAGNOSIS — R30.0 DYSURIA: ICD-10-CM

## 2019-07-16 DIAGNOSIS — Z41.9 ENCOUNTER FOR PROCEDURE FOR PURPOSES OTHER THAN REMEDYING HEALTH STATE, UNSPECIFIED: Chronic | ICD-10-CM

## 2019-07-16 DIAGNOSIS — N39.0 URINARY TRACT INFECTION, SITE NOT SPECIFIED: ICD-10-CM

## 2019-07-16 DIAGNOSIS — J31.0 CHRONIC RHINITIS: ICD-10-CM

## 2019-07-16 DIAGNOSIS — Z90.89 ACQUIRED ABSENCE OF OTHER ORGANS: Chronic | ICD-10-CM

## 2019-07-16 DIAGNOSIS — Z79.899 OTHER LONG TERM (CURRENT) DRUG THERAPY: ICD-10-CM

## 2019-07-16 DIAGNOSIS — B96.20 UNSPECIFIED ESCHERICHIA COLI [E. COLI] AS THE CAUSE OF DISEASES CLASSIFIED ELSEWHERE: ICD-10-CM

## 2019-07-16 DIAGNOSIS — Z98.890 OTHER SPECIFIED POSTPROCEDURAL STATES: Chronic | ICD-10-CM

## 2019-07-16 DIAGNOSIS — M19.90 UNSPECIFIED OSTEOARTHRITIS, UNSPECIFIED SITE: ICD-10-CM

## 2019-07-16 LAB
APPEARANCE UR: CLEAR — SIGNIFICANT CHANGE UP
BACTERIA # UR AUTO: ABNORMAL
BILIRUB UR-MCNC: NEGATIVE — SIGNIFICANT CHANGE UP
COLOR SPEC: YELLOW — SIGNIFICANT CHANGE UP
COMMENT - URINE: SIGNIFICANT CHANGE UP
COMMENT - URINE: SIGNIFICANT CHANGE UP
DIFF PNL FLD: ABNORMAL
EPI CELLS # UR: ABNORMAL
GLUCOSE UR QL: NEGATIVE MG/DL — SIGNIFICANT CHANGE UP
KETONES UR-MCNC: NEGATIVE — SIGNIFICANT CHANGE UP
LEUKOCYTE ESTERASE UR-ACNC: ABNORMAL
NITRITE UR-MCNC: POSITIVE
PH UR: 5 — SIGNIFICANT CHANGE UP (ref 5–8)
PROT UR-MCNC: 100 MG/DL
RBC CASTS # UR COMP ASSIST: ABNORMAL /HPF (ref 0–4)
SP GR SPEC: 1.02 — SIGNIFICANT CHANGE UP (ref 1.01–1.02)
UROBILINOGEN FLD QL: NEGATIVE MG/DL — SIGNIFICANT CHANGE UP
WBC UR QL: ABNORMAL

## 2019-07-16 PROCEDURE — 99283 EMERGENCY DEPT VISIT LOW MDM: CPT

## 2019-07-16 RX ORDER — PHENAZOPYRIDINE HCL 100 MG
1 TABLET ORAL
Qty: 6 | Refills: 0
Start: 2019-07-16 | End: 2019-07-17

## 2019-07-16 RX ORDER — CEPHALEXIN 500 MG
500 CAPSULE ORAL ONCE
Refills: 0 | Status: COMPLETED | OUTPATIENT
Start: 2019-07-16 | End: 2019-07-16

## 2019-07-16 RX ORDER — CEPHALEXIN 500 MG
1 CAPSULE ORAL
Qty: 21 | Refills: 0
Start: 2019-07-16 | End: 2019-07-22

## 2019-07-16 RX ORDER — PHENAZOPYRIDINE HCL 100 MG
200 TABLET ORAL ONCE
Refills: 0 | Status: COMPLETED | OUTPATIENT
Start: 2019-07-16 | End: 2019-07-16

## 2019-07-16 RX ADMIN — Medication 200 MILLIGRAM(S): at 04:05

## 2019-07-16 RX ADMIN — Medication 500 MILLIGRAM(S): at 04:05

## 2019-07-16 NOTE — ED ADULT NURSE NOTE - NSIMPLEMENTINTERV_GEN_ALL_ED
Implemented All Universal Safety Interventions:  [place] to call system. Call bell, personal items and telephone within reach. Instruct patient to call for assistance. Room bathroom lighting operational. Non-slip footwear when patient is off stretcher. Physically safe environment: no spills, clutter or unnecessary equipment. Stretcher in lowest position, wheels locked, appropriate side rails in place.

## 2019-07-16 NOTE — ED ADULT NURSE NOTE - OBJECTIVE STATEMENT
pt c/o dysuria, burning and frequency. states she couldn't fall asleep because of it, that is happened so suddenly.

## 2019-07-16 NOTE — ED PROVIDER NOTE - OBJECTIVE STATEMENT
77 yo female pw burning on urination and frequency that started at 11pm tonight. no fever, chills, no abdominal or back pain.

## 2019-09-16 ENCOUNTER — APPOINTMENT (OUTPATIENT)
Dept: INTERNAL MEDICINE | Facility: CLINIC | Age: 79
End: 2019-09-16
Payer: MEDICARE

## 2019-09-16 VITALS
BODY MASS INDEX: 23.64 KG/M2 | HEIGHT: 68 IN | HEART RATE: 58 BPM | SYSTOLIC BLOOD PRESSURE: 150 MMHG | WEIGHT: 156 LBS | RESPIRATION RATE: 16 BRPM | OXYGEN SATURATION: 97 % | TEMPERATURE: 97.7 F | DIASTOLIC BLOOD PRESSURE: 90 MMHG

## 2019-09-16 PROCEDURE — 99214 OFFICE O/P EST MOD 30 MIN: CPT

## 2019-09-16 NOTE — ASSESSMENT
[FreeTextEntry1] : \par she will continue to monitor BP and bring readings to next visit.  \par \par Low Na, low sat fat diet.\par \par Again discussed use of Statin but she declines at this time and wishes to continue with TLCs.   \par \par Flu vaccine declined today but will obtain next week.  \par \par Repeat BW 1 week before next visit.  \par \par

## 2019-09-16 NOTE — HISTORY OF PRESENT ILLNESS
[FreeTextEntry1] : FU BP [de-identified] : Feeling well .   She stopped sudafed after last visit and has been monitoring her BP at [facility] .  Readings averaging 120's/60's.  No readings over 140/90.   Recently was on vacation and not eating usual foods.  \par She did see  - ECHO and Carotid studies done and reported showed no changes. Nuclear ST no ischemia.    \par

## 2019-11-01 ENCOUNTER — APPOINTMENT (OUTPATIENT)
Dept: INTERNAL MEDICINE | Facility: CLINIC | Age: 79
End: 2019-11-01

## 2019-11-01 ENCOUNTER — APPOINTMENT (OUTPATIENT)
Dept: INTERNAL MEDICINE | Facility: CLINIC | Age: 79
End: 2019-11-01
Payer: MEDICARE

## 2019-11-01 VITALS
OXYGEN SATURATION: 97 % | SYSTOLIC BLOOD PRESSURE: 156 MMHG | WEIGHT: 160 LBS | TEMPERATURE: 97.8 F | HEIGHT: 68 IN | RESPIRATION RATE: 18 BRPM | BODY MASS INDEX: 24.25 KG/M2 | HEART RATE: 64 BPM | DIASTOLIC BLOOD PRESSURE: 84 MMHG

## 2019-11-01 VITALS — DIASTOLIC BLOOD PRESSURE: 84 MMHG | SYSTOLIC BLOOD PRESSURE: 138 MMHG

## 2019-11-01 DIAGNOSIS — J30.2 OTHER SEASONAL ALLERGIC RHINITIS: ICD-10-CM

## 2019-11-01 LAB — HBA1C MFR BLD HPLC: 6.4

## 2019-11-01 PROCEDURE — 99214 OFFICE O/P EST MOD 30 MIN: CPT

## 2019-11-01 NOTE — HISTORY OF PRESENT ILLNESS
[FreeTextEntry1] : ANA DURAN is a 79 year F who comes in for a follow up visit.\par  [de-identified] : Patient is a 79-year-old female the history of hypertension, hyperlipidemia comes in for a followup visit. She recently was in to have her blood pressure followed up as blood pressure was elevated in the office in the 150s by 90s. She states she went to [facility] yesterday her blood pressure is 126/66. She has not been able to make many dietary changes with low salt as she is still eating frozen and fast food as well as sweets at work. We did review her blood work today with cholesterol showing minor elevation in her total cholesterol to 209 from 191 and LDL at 119 from 111. Her hemoglobin A1c is also gone up from 6.3-6.4. We did discuss the results and the risks of diabetes mellitus type 2 and high cholesterol. She is willing to make dietary changes.\par Patient denies any cp, sob,abdominal pain, nausea, vomiting, palpitations, fever, chills, constipation, diarrhea.\par

## 2019-11-01 NOTE — ASSESSMENT
[FreeTextEntry1] : 1.hypertension: Advised to buy a blood pressure cuff and check blood pressure at home a daily basis. Return in 3 months with blood pressure readings and machine. Discussed low sodium diet and she does not wish to start medication.\par \par 2.hyperlipidemia: Discussed low-cholesterol diet, reviewed blood work today.\par \par 3.prediabetes: Discussed low carbohydrate high-protein diet and diabetic diet. Recheck hemoglobin A1c in 3 months.\par \par 4.health maintenance: Patient has received flu vaccine, she will follow up with the pharmacy for her shingles and tetanus vaccine.

## 2020-01-30 LAB — HBA1C MFR BLD HPLC: 6.3

## 2020-02-07 ENCOUNTER — APPOINTMENT (OUTPATIENT)
Dept: INTERNAL MEDICINE | Facility: CLINIC | Age: 80
End: 2020-02-07
Payer: MEDICARE

## 2020-02-07 VITALS
SYSTOLIC BLOOD PRESSURE: 136 MMHG | OXYGEN SATURATION: 97 % | RESPIRATION RATE: 16 BRPM | HEART RATE: 58 BPM | WEIGHT: 161 LBS | BODY MASS INDEX: 24.4 KG/M2 | HEIGHT: 68 IN | TEMPERATURE: 98 F | DIASTOLIC BLOOD PRESSURE: 86 MMHG

## 2020-02-07 VITALS — SYSTOLIC BLOOD PRESSURE: 144 MMHG | DIASTOLIC BLOOD PRESSURE: 84 MMHG

## 2020-02-07 PROCEDURE — 99214 OFFICE O/P EST MOD 30 MIN: CPT

## 2020-02-07 RX ORDER — FLUTICASONE PROPIONATE 50 UG/1
50 SPRAY, METERED NASAL DAILY
Qty: 1 | Refills: 5 | Status: DISCONTINUED | COMMUNITY
Start: 2019-05-23 | End: 2020-02-07

## 2020-02-07 NOTE — ASSESSMENT
[FreeTextEntry1] : 1.Hypertension: not well controlled. Advised low sodium diet, exercise-counseled on diet/LFM. Does not wish to start medication at this time-understands risks. \par \par 2. Prediabetes: low carb, low sugar, high protein diet advised. recheck in 6 mo. \par \par Follow up in 2 months.

## 2020-02-07 NOTE — HISTORY OF PRESENT ILLNESS
[FreeTextEntry1] : ANA DURAN is a 79 year F who comes in for a follow up visit.\par  [de-identified] : Pt is a 78 y/o with hx of HTN, HLD, prediabetes comes in for bp follow up. Her BP from Nov to Feb range from 122-160 systolic and diastolic 67-92. She did not do too well with low sodium, low carb diet. People at [employer] bring a lot of food/sweets to [employer] where she works. Also has not done well a home, ate Chinese food last 2 days with bp high this morning at home at 160/85. Will do better with diet moving forward. Will start the biggest loser next week to help reduce weight/curb diet. \par Patient denies any cp, sob,abdominal pain, nausea, vomiting, palpitations, fever, chills, constipation, diarrhea.\par

## 2020-04-03 ENCOUNTER — APPOINTMENT (OUTPATIENT)
Dept: INTERNAL MEDICINE | Facility: CLINIC | Age: 80
End: 2020-04-03

## 2020-04-20 ENCOUNTER — APPOINTMENT (OUTPATIENT)
Dept: PHYSICAL MEDICINE AND REHAB | Facility: CLINIC | Age: 80
End: 2020-04-20
Payer: MEDICARE

## 2020-04-20 VITALS
DIASTOLIC BLOOD PRESSURE: 84 MMHG | TEMPERATURE: 97.9 F | HEART RATE: 61 BPM | BODY MASS INDEX: 23.95 KG/M2 | WEIGHT: 158 LBS | HEIGHT: 68 IN | SYSTOLIC BLOOD PRESSURE: 173 MMHG

## 2020-04-20 DIAGNOSIS — M75.42 IMPINGEMENT SYNDROME OF LEFT SHOULDER: ICD-10-CM

## 2020-04-20 PROCEDURE — 99202 OFFICE O/P NEW SF 15 MIN: CPT

## 2020-04-20 PROCEDURE — 73030 X-RAY EXAM OF SHOULDER: CPT | Mod: LT

## 2020-04-20 NOTE — PHYSICAL EXAM
[FreeTextEntry1] : NAD\par A&Ox3\par Non-obese\par ROM Left Shoulder: 150' ABD; 160' FF w/ pain endROM; AROM IR T-L jxn\par Neer's: mildy +\par Hawkin's: mildly +\par Drop Arm: neg\par Scarf: deferred\par RC MMT: 5/5 B/L D/SS/IS\par Palpation: mildly TTP distal SS tendon insertion; no PJLT\par

## 2020-04-20 NOTE — ASSESSMENT
[FreeTextEntry1] : 79 y.o. F w/ left shoulder RC tendinopathy.  Low suspicion for high-grade RCT tear.  Will Rx low dose meloxicam 7.5 mg; one tab po qd x 1-2 weeks prn.  Start P.T. for modalities, gentle ROM, stretching and strengthening exercises.  Will consider USG left shoulder SASD bursal CSI if patient is unable to advance her rehab program.  RTC or telehealth f/u 6-8 weeks.

## 2020-04-20 NOTE — DATA REVIEWED
[Plain X-Rays] : plain X-Rays [FreeTextEntry1] : X-rays (2 views) shoulder obtained at today's office visit c/w: well preserved GH joint space; slight widening of AC joint (ie, s/p surgery of distal clavicle osteolysis).

## 2020-04-20 NOTE — HISTORY OF PRESENT ILLNESS
[FreeTextEntry1] : 79 y.o. JOY GARSIA presents to office w/ c/o left shoulder pain.  She reports h/o left shoulder arthroscopy 2 years ago after which she did well.  Pt. states that her pain flared up about 2-3 weeks ago after lifting lid of dumpster.  Pain is mostly anterior w/ some radiation down proximal arm slightly past elbow.  Denies N/T/W in hand.  No recent imaging.  No P.T.  No NSAIDs.  No injections.

## 2020-07-16 LAB — HBA1C MFR BLD HPLC: 6.2

## 2020-07-31 ENCOUNTER — APPOINTMENT (OUTPATIENT)
Dept: INTERNAL MEDICINE | Facility: CLINIC | Age: 80
End: 2020-07-31
Payer: MEDICARE

## 2020-07-31 VITALS
TEMPERATURE: 97.6 F | HEART RATE: 54 BPM | OXYGEN SATURATION: 97 % | RESPIRATION RATE: 16 BRPM | WEIGHT: 154 LBS | DIASTOLIC BLOOD PRESSURE: 78 MMHG | SYSTOLIC BLOOD PRESSURE: 112 MMHG | HEIGHT: 68 IN | BODY MASS INDEX: 23.34 KG/M2

## 2020-07-31 DIAGNOSIS — M25.512 PAIN IN LEFT SHOULDER: ICD-10-CM

## 2020-07-31 DIAGNOSIS — Z00.00 ENCOUNTER FOR GENERAL ADULT MEDICAL EXAMINATION W/OUT ABNORMAL FINDINGS: ICD-10-CM

## 2020-07-31 PROCEDURE — G0439: CPT

## 2020-07-31 RX ORDER — MELOXICAM 7.5 MG/1
7.5 TABLET ORAL
Qty: 30 | Refills: 1 | Status: DISCONTINUED | COMMUNITY
Start: 2020-04-20 | End: 2020-07-31

## 2020-07-31 NOTE — HISTORY OF PRESENT ILLNESS
[de-identified] : Patient comes in for an annual physical exam.\par Patient is 79-year-old female history of hypertension comes in for annual exam. We discussed her recent blood work which shows very good cholesterol and hemoglobin A1c reduced to 6.2. Creatinine mildly elevated at 1.0 decreasing her GFR. Patient does admit to not drinking a lot of fluids. She does have exacerbations of left shoulder pain and she does a lot of heavy lifting at home. She has seen PM and R. and had a reaction to meloxicam. She will continue to stop heavy lifting take Tylenol for pain support and follow up regarding cortisone injection.\par Patient denies any cp, sob,abdominal pain, nausea, vomiting, palpitations, fever, chills, constipation, diarrhea.\par  [FreeTextEntry1] : Patient comes in today for a CPE.

## 2020-07-31 NOTE — ASSESSMENT
[FreeTextEntry1] : 1.hypertension: Doing much better on low sodium diet, continue to monitor.\par \par 2.prediabetes: Hemoglobin A1c improved at 6.2, continue diabetic diet.\par \par 3.renal insufficiency: Recheck BMP prior to next visit, increase hydration.\par \par 4.left shoulder pain: Followup with rehabilitation doctor, may need cortisone injection.

## 2020-07-31 NOTE — HEALTH RISK ASSESSMENT
[Employed] : employed [Yes] : Yes [No] : In the past 12 months have you used drugs other than those required for medical reasons? No [Smoke Detector] : smoke detector [Safety elements used in home] : safety elements used in home [Carbon Monoxide Detector] : carbon monoxide detector [Seat Belt] :  uses seat belt [Fully functional (bathing, dressing, toileting, transferring, walking, feeding)] : Fully functional (bathing, dressing, toileting, transferring, walking, feeding) [Fully functional (using the telephone, shopping, preparing meals, housekeeping, doing laundry, using] : Fully functional and needs no help or supervision to perform IADLs (using the telephone, shopping, preparing meals, housekeeping, doing laundry, using transportation, managing medications and managing finances) [] : No [de-identified] : occasional wine [Guns at Home] : no guns at home [Sunscreen] : does not use sunscreen [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

## 2020-10-30 ENCOUNTER — APPOINTMENT (OUTPATIENT)
Dept: ULTRASOUND IMAGING | Facility: CLINIC | Age: 80
End: 2020-10-30
Payer: MEDICARE

## 2020-10-30 ENCOUNTER — APPOINTMENT (OUTPATIENT)
Dept: INTERNAL MEDICINE | Facility: CLINIC | Age: 80
End: 2020-10-30
Payer: MEDICARE

## 2020-10-30 ENCOUNTER — OUTPATIENT (OUTPATIENT)
Dept: OUTPATIENT SERVICES | Facility: HOSPITAL | Age: 80
LOS: 1 days | End: 2020-10-30
Payer: MEDICARE

## 2020-10-30 VITALS
HEIGHT: 68 IN | BODY MASS INDEX: 23.64 KG/M2 | DIASTOLIC BLOOD PRESSURE: 84 MMHG | OXYGEN SATURATION: 99 % | WEIGHT: 156 LBS | HEART RATE: 66 BPM | TEMPERATURE: 97.5 F | SYSTOLIC BLOOD PRESSURE: 124 MMHG | RESPIRATION RATE: 16 BRPM

## 2020-10-30 DIAGNOSIS — Z41.9 ENCOUNTER FOR PROCEDURE FOR PURPOSES OTHER THAN REMEDYING HEALTH STATE, UNSPECIFIED: Chronic | ICD-10-CM

## 2020-10-30 DIAGNOSIS — Z98.890 OTHER SPECIFIED POSTPROCEDURAL STATES: Chronic | ICD-10-CM

## 2020-10-30 DIAGNOSIS — M79.605 PAIN IN LEFT LEG: ICD-10-CM

## 2020-10-30 DIAGNOSIS — Z90.89 ACQUIRED ABSENCE OF OTHER ORGANS: Chronic | ICD-10-CM

## 2020-10-30 PROCEDURE — 93971 EXTREMITY STUDY: CPT

## 2020-10-30 PROCEDURE — 93971 EXTREMITY STUDY: CPT | Mod: 26,LT

## 2020-10-30 PROCEDURE — 99214 OFFICE O/P EST MOD 30 MIN: CPT

## 2020-10-30 NOTE — ASSESSMENT
[FreeTextEntry1] : 1.left lower extremity pain: Obtain venous duplex to rule out DVT.\par Discussed signs and symptoms to warrant urgent evaluation with patient.\par

## 2020-10-30 NOTE — HISTORY OF PRESENT ILLNESS
[FreeTextEntry1] : ANA DURAN is a 80 year F who comes in for a follow up visit.\par  [de-identified] : Pt having pain in LLE where varicose veins are, she stands/walks a lot at work at [employer]. Didn't take any pain medications. \par Patient denies any injury or trauma to the area or any bruising.\par Pt went on vacation to [place] and gained back a few lbs. \par Patient denies any cp, sob,abdominal pain, nausea, vomiting, palpitations, fever, chills, constipation, diarrhea.\par

## 2020-11-03 ENCOUNTER — NON-APPOINTMENT (OUTPATIENT)
Age: 80
End: 2020-11-03

## 2021-01-31 ENCOUNTER — TRANSCRIPTION ENCOUNTER (OUTPATIENT)
Age: 81
End: 2021-01-31

## 2021-02-12 ENCOUNTER — APPOINTMENT (OUTPATIENT)
Dept: INTERNAL MEDICINE | Facility: CLINIC | Age: 81
End: 2021-02-12

## 2021-02-17 ENCOUNTER — TRANSCRIPTION ENCOUNTER (OUTPATIENT)
Age: 81
End: 2021-02-17

## 2021-03-12 ENCOUNTER — APPOINTMENT (OUTPATIENT)
Dept: DERMATOLOGY | Facility: CLINIC | Age: 81
End: 2021-03-12
Payer: MEDICARE

## 2021-03-12 PROCEDURE — 99204 OFFICE O/P NEW MOD 45 MIN: CPT

## 2021-03-12 NOTE — PHYSICAL EXAM
[FreeTextEntry3] : Erythematous scaling patches with inflammation and cracking;  very tender;  widespread over both palms; \par no vesicles or pustules;

## 2021-03-12 NOTE — ASSESSMENT
[FreeTextEntry1] : Severely flaring hand eczema; \par Therapeutic options and their risks and benefits; along with multiple diagnostic possibilities were discussed at length; risks and benefits of further study were discussed;\par \par Prednisone taper;  mometasone ointment spot tx after steroid finishes;  continue aveeno lotion; \par The patient will consider patch testing if this condition fails to respond to treatment.

## 2021-03-12 NOTE — HISTORY OF PRESENT ILLNESS
[de-identified] : Pt. c/o significantly symptomatic eczema on hands;  used only OTCs; \par flared significantly after taking care of mother last 2 weeks

## 2021-04-16 ENCOUNTER — APPOINTMENT (OUTPATIENT)
Dept: INTERNAL MEDICINE | Facility: CLINIC | Age: 81
End: 2021-04-16
Payer: MEDICARE

## 2021-04-16 VITALS
DIASTOLIC BLOOD PRESSURE: 80 MMHG | HEART RATE: 66 BPM | OXYGEN SATURATION: 98 % | HEIGHT: 68 IN | TEMPERATURE: 97 F | RESPIRATION RATE: 16 BRPM | SYSTOLIC BLOOD PRESSURE: 140 MMHG | WEIGHT: 157 LBS | BODY MASS INDEX: 23.79 KG/M2

## 2021-04-16 VITALS — DIASTOLIC BLOOD PRESSURE: 80 MMHG | SYSTOLIC BLOOD PRESSURE: 138 MMHG

## 2021-04-16 PROCEDURE — 99214 OFFICE O/P EST MOD 30 MIN: CPT

## 2021-04-16 RX ORDER — PREDNISONE 20 MG/1
20 TABLET ORAL
Qty: 24 | Refills: 0 | Status: DISCONTINUED | COMMUNITY
Start: 2021-03-12 | End: 2021-04-16

## 2021-04-16 NOTE — ASSESSMENT
[FreeTextEntry1] : 1.HTN: moderately well controlled, continue with diet change. Check blood pressure daily, if greater than 150/90, call MD. Keep 2 gm low sodium diet, exercise as tolerated.\par \par 2.Renal insufficiency: c/w hydration, recheck bmp in 6 mo. avoid nsaids. \par \par 3. Prediabetes: recheck in 6 mo, advised to keep diabetic diet-low carb, high protein diet, exercise as tolerated and recheck hba1c in 6 mo.\par

## 2021-04-16 NOTE — HISTORY OF PRESENT ILLNESS
[FreeTextEntry1] : FU [de-identified] : ANA DURAN is a 80 year F who is being seen today in follow up to discuss blood work results.\par

## 2021-09-24 ENCOUNTER — APPOINTMENT (OUTPATIENT)
Dept: OTOLARYNGOLOGY | Facility: CLINIC | Age: 81
End: 2021-09-24
Payer: MEDICARE

## 2021-09-24 VITALS
HEIGHT: 68 IN | HEART RATE: 64 BPM | DIASTOLIC BLOOD PRESSURE: 78 MMHG | SYSTOLIC BLOOD PRESSURE: 170 MMHG | BODY MASS INDEX: 23.49 KG/M2 | WEIGHT: 155 LBS | TEMPERATURE: 96.4 F

## 2021-09-24 PROCEDURE — 92567 TYMPANOMETRY: CPT

## 2021-09-24 PROCEDURE — 92557 COMPREHENSIVE HEARING TEST: CPT

## 2021-09-24 PROCEDURE — G0268 REMOVAL OF IMPACTED WAX MD: CPT

## 2021-09-24 PROCEDURE — 99203 OFFICE O/P NEW LOW 30 MIN: CPT | Mod: 25

## 2021-09-24 NOTE — ASSESSMENT
[FreeTextEntry1] : grace cleared au\par audio au mild loss w excellent discrim\par borderline hearing aid candidate\par rec fu audio 1 y

## 2021-10-15 ENCOUNTER — APPOINTMENT (OUTPATIENT)
Dept: INTERNAL MEDICINE | Facility: CLINIC | Age: 81
End: 2021-10-15
Payer: MEDICARE

## 2021-10-15 VITALS — SYSTOLIC BLOOD PRESSURE: 138 MMHG | DIASTOLIC BLOOD PRESSURE: 84 MMHG

## 2021-10-15 VITALS
DIASTOLIC BLOOD PRESSURE: 88 MMHG | HEART RATE: 59 BPM | HEIGHT: 68 IN | WEIGHT: 155 LBS | BODY MASS INDEX: 23.49 KG/M2 | TEMPERATURE: 98.2 F | SYSTOLIC BLOOD PRESSURE: 160 MMHG | OXYGEN SATURATION: 98 %

## 2021-10-15 DIAGNOSIS — I65.23 OCCLUSION AND STENOSIS OF BILATERAL CAROTID ARTERIES: ICD-10-CM

## 2021-10-15 PROCEDURE — G0444 DEPRESSION SCREEN ANNUAL: CPT | Mod: 59

## 2021-10-15 PROCEDURE — 99214 OFFICE O/P EST MOD 30 MIN: CPT | Mod: 25

## 2021-10-15 RX ORDER — TRIAMCINOLONE ACETONIDE 1 MG/G
0.1 CREAM TOPICAL
Qty: 30 | Refills: 0 | Status: DISCONTINUED | COMMUNITY
Start: 2021-05-08 | End: 2021-10-15

## 2021-10-15 RX ORDER — CRISABOROLE 20 MG/G
2 OINTMENT TOPICAL
Qty: 60 | Refills: 0 | Status: DISCONTINUED | COMMUNITY
Start: 2021-05-06 | End: 2021-10-15

## 2021-10-15 RX ORDER — AMOXICILLIN 500 MG/1
500 CAPSULE ORAL
Qty: 30 | Refills: 0 | Status: DISCONTINUED | COMMUNITY
Start: 2021-04-08 | End: 2021-10-15

## 2021-10-15 RX ORDER — MOMETASONE FUROATE 1 MG/G
0.1 OINTMENT TOPICAL
Qty: 1 | Refills: 3 | Status: DISCONTINUED | COMMUNITY
Start: 2021-03-12 | End: 2021-10-15

## 2021-10-18 NOTE — HISTORY OF PRESENT ILLNESS
[FreeTextEntry1] : FU [de-identified] : ANA DURAN is a 81 year F who comes in for a follow up visit.\par Pt has had a lot of stress as her 96 y/o step mother has heart surgery and her job is stressful as well too. She has been indulgent more with "comfort foods" and not been exercising as much. \par Recent bw all reviewed. \par Patient denies any cp, sob,abdominal pain, nausea, vomiting, palpitations, fever, chills, constipation, diarrhea.\par

## 2021-10-18 NOTE — ASSESSMENT
[FreeTextEntry1] : 1.HTN: Check blood pressure daily, if greater than 150/90, call MD. Keep 2 gm low sodium diet, exercise as tolerated.\par f/u in 3 months. \par \par 2. Prediabetes: recheck hba1c in 3 mo. Pt advised to keep diabetic diet, decrease carbs and increase dietary protein intake.\par Exercise as tolerated 3-4 times a week.\par

## 2022-01-14 ENCOUNTER — APPOINTMENT (OUTPATIENT)
Dept: INTERNAL MEDICINE | Facility: CLINIC | Age: 82
End: 2022-01-14
Payer: MEDICARE

## 2022-01-14 VITALS
OXYGEN SATURATION: 98 % | TEMPERATURE: 97.4 F | WEIGHT: 158 LBS | DIASTOLIC BLOOD PRESSURE: 70 MMHG | HEIGHT: 68 IN | HEART RATE: 67 BPM | SYSTOLIC BLOOD PRESSURE: 132 MMHG | BODY MASS INDEX: 23.95 KG/M2

## 2022-01-14 PROCEDURE — 96127 BRIEF EMOTIONAL/BEHAV ASSMT: CPT

## 2022-01-14 PROCEDURE — 99214 OFFICE O/P EST MOD 30 MIN: CPT | Mod: 25

## 2022-01-14 NOTE — ED ADULT NURSE NOTE - CARDIO ASSESSMENT
[facility] at [address] [facility] Emergency Update    Date: 01/14/22    Anuradha Fuelling 1981    Medication: Rinvoq 15 mg     Prior Authorization: through 1/14/2023    Prescription needs to be transferred to [address] [place] [address] (phone: [phone]). [facility] ([phone]) was notified that this specialty prescription needs to be transferred to the insurance mandated specialty pharmacy above.      Ton Aguilar, [address] at [facility], [address]   [address]  phone: [phone]   fax: [phone] [place]

## 2022-01-14 NOTE — ASSESSMENT
[FreeTextEntry1] : 1.HTN: Check blood pressure daily, if greater than 150/90, call MD. Keep 2 gm low sodium diet, exercise as tolerated.\par f/u in 3 months. \par Discussed fasting blood work to get.\par \par 2. Prediabetes: recheck hba1c in 3 mo. Pt advised to keep diabetic diet, decrease carbs and increase dietary protein intake.\par Exercise as tolerated 3-4 times a week.\par

## 2022-01-14 NOTE — HISTORY OF PRESENT ILLNESS
[FreeTextEntry1] : FU [de-identified] : ANA DURAN is a 81 year F who is being seen today in follow up to discuss blood work results.\par Pt notes eating more ice cream recently since xmas but had cut back prior to that.\par She would like a more complete bw next visit.\par Patient denies any cp, sob,abdominal pain, nausea, vomiting, palpitations, fever, chills, constipation, diarrhea.\par

## 2022-02-14 NOTE — H&P PST ADULT - WEIGHT IN LBS
158.2 Cosentyx Counseling:  I discussed with the patient the risks of Cosentyx including but not limited to worsening of Crohn's disease, immunosuppression, allergic reactions and infections.  The patient understands that monitoring is required including a PPD at baseline and must alert us or the primary physician if symptoms of infection or other concerning signs are noted.

## 2022-04-15 ENCOUNTER — APPOINTMENT (OUTPATIENT)
Dept: INTERNAL MEDICINE | Facility: CLINIC | Age: 82
End: 2022-04-15
Payer: MEDICARE

## 2022-04-15 VITALS — SYSTOLIC BLOOD PRESSURE: 130 MMHG | DIASTOLIC BLOOD PRESSURE: 80 MMHG

## 2022-04-15 VITALS
HEART RATE: 61 BPM | WEIGHT: 158 LBS | DIASTOLIC BLOOD PRESSURE: 78 MMHG | HEIGHT: 68 IN | SYSTOLIC BLOOD PRESSURE: 154 MMHG | OXYGEN SATURATION: 97 % | TEMPERATURE: 97.7 F | BODY MASS INDEX: 23.95 KG/M2

## 2022-04-15 VITALS — SYSTOLIC BLOOD PRESSURE: 138 MMHG | DIASTOLIC BLOOD PRESSURE: 78 MMHG

## 2022-04-15 DIAGNOSIS — M54.9 DORSALGIA, UNSPECIFIED: ICD-10-CM

## 2022-04-15 PROCEDURE — 99214 OFFICE O/P EST MOD 30 MIN: CPT

## 2022-04-15 NOTE — HISTORY OF PRESENT ILLNESS
[FreeTextEntry1] : Fu [de-identified] : ANA DURAN is a 81 year F who is being seen today in follow up to discuss blood work results.\par Pt notes mid low back that began on wed that is on/off, non radiating, she took Tylenol with mild relief of symptoms. She was moving things at [employer] earlier this week.  No numbness tingling or urinary or bowel incontinence.\par We reviewed her blood work today which shows hemoglobin A1c higher at 6.4 from 6.03 months ago.  She does admit to eating a lot of pasta for dinner but has cut back on the ice cream.  Her platelets are mildly elevated and GFR has improved to 60.  Cholesterol shows mild improvement.\par Patient denies any cp, sob,abdominal pain, nausea, vomiting, palpitations, fever, chills, constipation, diarrhea.\par

## 2022-04-15 NOTE — ASSESSMENT
[FreeTextEntry1] : 1.HTN: Check blood pressure daily, if greater than 150/90, call MD. Keep 2 gm low sodium diet, exercise as tolerated.\par f/u in 3 months. \par Discussed fasting blood work to get.\par \par 2. Prediabetes: recheck hba1c in 3 mo. Pt advised to keep diabetic diet, decrease carbs and increase dietary protein intake.\par Exercise as tolerated 3-4 times a week.\par \par 3.acute low back pain: Advised using heating pad as needed and to use tizanidine as needed at bedtime. Went over instructions and side effects of medication.\par \par

## 2022-07-15 ENCOUNTER — APPOINTMENT (OUTPATIENT)
Dept: INTERNAL MEDICINE | Facility: CLINIC | Age: 82
End: 2022-07-15

## 2022-07-15 VITALS — SYSTOLIC BLOOD PRESSURE: 140 MMHG | DIASTOLIC BLOOD PRESSURE: 90 MMHG

## 2022-07-15 VITALS
HEIGHT: 68 IN | BODY MASS INDEX: 23.49 KG/M2 | WEIGHT: 155 LBS | HEART RATE: 60 BPM | SYSTOLIC BLOOD PRESSURE: 142 MMHG | DIASTOLIC BLOOD PRESSURE: 94 MMHG | TEMPERATURE: 98.2 F | OXYGEN SATURATION: 98 %

## 2022-07-15 DIAGNOSIS — E55.9 VITAMIN D DEFICIENCY, UNSPECIFIED: ICD-10-CM

## 2022-07-15 PROCEDURE — 99213 OFFICE O/P EST LOW 20 MIN: CPT

## 2022-07-15 NOTE — HISTORY OF PRESENT ILLNESS
[FreeTextEntry1] : FU [de-identified] : ANA DURAN is a 81 year F who comes in for a follow up visit.\par She notes her bp last night and it was 116/59 and this morning it was 125/71 at home. \par We reviewed all of her labs today and hba1c has improved at 6.0 from 6.4. Her bun/creat showed mildly low gfr.\par She did recently have a bill from 1/2022 that was higher than usual co-pay and she paid it and has not been able to get through to billing. \par Patient denies any cp, sob,abdominal pain, nausea, vomiting, palpitations, fever, chills, constipation, diarrhea.\par

## 2022-07-15 NOTE — ASSESSMENT
[FreeTextEntry1] : 1.HTN: Check blood pressure daily, if greater than 150/90, call MD. Keep 2 gm low sodium diet, exercise as tolerated.\par f/u in 3 months. \par Discussed fasting blood work to get.\par \par 2. Prediabetes: hba1c improved at 6! recheck hba1c in 4 mo. Pt advised to keep diabetic diet, decrease carbs and increase dietary protein intake.\par Exercise as tolerated 3-4 times a week.\par \par 3.  Renal insufficiency: Advised hydration and to avoid NSAID and recheck BMP in 3 months.\par \par 4.vitamin D deficiency: Per patient she was not able to have this level checked per [facility], advised its not been checked since April 2021 and to have this checked with next blood draw.\par \par Have related billing issue to ROBINSON Bae and she will follow-up regarding bill with patient.\par \par

## 2022-09-19 ENCOUNTER — APPOINTMENT (OUTPATIENT)
Dept: OTOLARYNGOLOGY | Facility: CLINIC | Age: 82
End: 2022-09-19

## 2022-09-19 VITALS — TEMPERATURE: 97.3 F | HEIGHT: 68 IN | BODY MASS INDEX: 23.49 KG/M2 | WEIGHT: 155 LBS

## 2022-09-19 DIAGNOSIS — R09.82 POSTNASAL DRIP: ICD-10-CM

## 2022-09-19 DIAGNOSIS — J30.0 VASOMOTOR RHINITIS: ICD-10-CM

## 2022-09-19 DIAGNOSIS — H90.3 SENSORINEURAL HEARING LOSS, BILATERAL: ICD-10-CM

## 2022-09-19 DIAGNOSIS — H69.83 OTHER SPECIFIED DISORDERS OF EUSTACHIAN TUBE, BILATERAL: ICD-10-CM

## 2022-09-19 PROCEDURE — 99213 OFFICE O/P EST LOW 20 MIN: CPT

## 2022-09-19 PROCEDURE — 92567 TYMPANOMETRY: CPT

## 2022-09-19 PROCEDURE — 92557 COMPREHENSIVE HEARING TEST: CPT

## 2022-09-19 NOTE — ASSESSMENT
[FreeTextEntry1] : excessive nasal drip using loratidine prn\par audio mild symmetric loss excelent discrim a/a tymps\par having some difficulty day to day\par rec hearing aid eval and fitting\par Time with patient over 30 minutes for exam and counseling.\par

## 2022-11-10 DIAGNOSIS — N28.9 DISORDER OF KIDNEY AND URETER, UNSPECIFIED: ICD-10-CM

## 2022-11-18 ENCOUNTER — APPOINTMENT (OUTPATIENT)
Dept: INTERNAL MEDICINE | Facility: CLINIC | Age: 82
End: 2022-11-18

## 2022-11-18 VITALS
HEIGHT: 68 IN | BODY MASS INDEX: 23.19 KG/M2 | OXYGEN SATURATION: 98 % | DIASTOLIC BLOOD PRESSURE: 80 MMHG | HEART RATE: 57 BPM | SYSTOLIC BLOOD PRESSURE: 150 MMHG | WEIGHT: 153 LBS | TEMPERATURE: 98 F

## 2022-11-18 VITALS — DIASTOLIC BLOOD PRESSURE: 82 MMHG | SYSTOLIC BLOOD PRESSURE: 140 MMHG

## 2022-11-18 DIAGNOSIS — E55.9 VITAMIN D DEFICIENCY, UNSPECIFIED: ICD-10-CM

## 2022-11-18 PROBLEM — N28.9 RENAL INSUFFICIENCY: Status: ACTIVE | Noted: 2022-04-15

## 2022-11-18 LAB
ESTIMATED AVERAGE GLUCOSE: 89
HBA1C MFR BLD HPLC: 6

## 2022-11-18 PROCEDURE — 99214 OFFICE O/P EST MOD 30 MIN: CPT

## 2022-11-18 RX ORDER — TIZANIDINE 2 MG/1
2 TABLET ORAL
Qty: 20 | Refills: 0 | Status: DISCONTINUED | COMMUNITY
Start: 2022-04-15 | End: 2022-11-18

## 2022-11-18 NOTE — ASSESSMENT
[FreeTextEntry1] : 1.HTN: Check blood pressure daily, if greater than 150/90, call MD. Keep 2 gm low sodium diet, exercise as tolerated.\par f/u in 3 months. \par Discussed fasting blood work to get.\par \par 2. Prediabetes: hba1c stable at 6. recheck hba1c in 4-6 mo. Pt advised to keep diabetic diet, decrease carbs and increase dietary protein intake.\par Exercise as tolerated 3-4 times a week.\par \par 3.Renal insufficiency: Advised hydration and to avoid NSAID and recheck BMP in 3-6 months.\par \par 4.vitamin D deficiency: pt on vitamin d supplementation, take daily instead of QOD.\par \par 5.HM: UTD with flu and covid vaccines, Does not wish for colonoscopy, mammogram or BMD. Patient counseled regarding recommendations for vaccines, seat belt safety, diet and exercise and all preventative screening.\par \par \par

## 2022-11-18 NOTE — HISTORY OF PRESENT ILLNESS
[FreeTextEntry1] : FU [de-identified] : ANA DURAN is a 81 year F who comes in for a follow up visit.\par She notes her bp last night and it was 116/59 and this morning it was 125/71 at home. \par We reviewed all of her labs today and hba1c has improved at 6.0 from 6.4. Her bun/creat showed mildly low gfr.\par She did recently have a bill from 1/2022 that was higher than usual co-pay and she paid it and has not been able to get through to billing. \par Patient denies any cp, sob,abdominal pain, nausea, vomiting, palpitations, fever, chills, constipation, diarrhea.\par

## 2023-03-03 ENCOUNTER — OFFICE (OUTPATIENT)
Dept: URBAN - METROPOLITAN AREA CLINIC 102 | Facility: CLINIC | Age: 83
Setting detail: OPHTHALMOLOGY
End: 2023-03-03
Payer: MEDICARE

## 2023-03-03 VITALS — HEIGHT: 55 IN

## 2023-03-03 DIAGNOSIS — H35.3131: ICD-10-CM

## 2023-03-03 DIAGNOSIS — H16.223: ICD-10-CM

## 2023-03-03 DIAGNOSIS — H40.1112: ICD-10-CM

## 2023-03-03 DIAGNOSIS — H40.033: ICD-10-CM

## 2023-03-03 DIAGNOSIS — H25.13: ICD-10-CM

## 2023-03-03 DIAGNOSIS — H52.4: ICD-10-CM

## 2023-03-03 DIAGNOSIS — H47.323: ICD-10-CM

## 2023-03-03 DIAGNOSIS — H40.1121: ICD-10-CM

## 2023-03-03 PROCEDURE — 92134 CPTRZ OPH DX IMG PST SGM RTA: CPT | Performed by: OPHTHALMOLOGY

## 2023-03-03 PROCEDURE — 92015 DETERMINE REFRACTIVE STATE: CPT | Performed by: OPHTHALMOLOGY

## 2023-03-03 PROCEDURE — 92083 EXTENDED VISUAL FIELD XM: CPT | Performed by: OPHTHALMOLOGY

## 2023-03-03 PROCEDURE — 99214 OFFICE O/P EST MOD 30 MIN: CPT | Performed by: OPHTHALMOLOGY

## 2023-03-03 ASSESSMENT — REFRACTION_AUTOREFRACTION
OS_CYLINDER: -1.50
OS_AXIS: 94
OD_AXIS: 92
OD_CYLINDER: -1.75
OS_SPHERE: +3.50
OD_SPHERE: +3.50

## 2023-03-03 ASSESSMENT — VISUAL ACUITY
OS_BCVA: 20/50
OD_BCVA: 20/40+

## 2023-03-03 ASSESSMENT — REFRACTION_CURRENTRX
OD_CYLINDER: -1.25
OS_SPHERE: +3.00
OS_AXIS: 102
OS_ADD: +2.25
OS_SPHERE: +3.00
OS_VPRISM_DIRECTION: BF
OS_AXIS: 90
OS_VPRISM_DIRECTION: BF
OS_OVR_VA: 20/
OD_ADD: +3.00
OD_CYLINDER: -1.00
OD_SPHERE: +3.25
OD_OVR_VA: 20/
OD_VPRISM_DIRECTION: BF
OD_VPRISM_DIRECTION: BF
OS_CYLINDER: -1.25
OS_ADD: +3.00
OD_AXIS: 088
OD_OVR_VA: 20/
OS_CYLINDER: -1.25
OD_AXIS: 85
OD_SPHERE: +3.00
OD_ADD: +2.25
OS_OVR_VA: 20/

## 2023-03-03 ASSESSMENT — AXIALLENGTH_DERIVED
OD_AL: 22.4027
OS_AL: 22.2026
OS_AL: 22.2026
OD_AL: 22.4027
OS_AL: 22.0739
OD_AL: 22.4027

## 2023-03-03 ASSESSMENT — SPHEQUIV_DERIVED
OS_SPHEQUIV: 2.75
OD_SPHEQUIV: 2.625
OS_SPHEQUIV: 2.375
OS_SPHEQUIV: 2.375
OD_SPHEQUIV: 2.625
OD_SPHEQUIV: 2.625

## 2023-03-03 ASSESSMENT — REFRACTION_MANIFEST
OD_CYLINDER: -1.25
OD_VA1: 20/50
OS_ADD: +2.75
OD_VA1: 20/30
OS_AXIS: 102
OD_AXIS: 088
OS_SPHERE: +3.00
OS_CYLINDER: -1.25
OD_ADD: +2.75
OS_ADD: +2.75
OD_ADD: +2.75
OD_CYLINDER: -1.25
OS_CYLINDER: -1.25
OD_SPHERE: +3.25
OS_AXIS: 102
OD_AXIS: 088
OS_VA1: 20/25
OD_SPHERE: +3.25
OS_SPHERE: +3.00
OS_VA1: 20/40

## 2023-03-03 ASSESSMENT — CONFRONTATIONAL VISUAL FIELD TEST (CVF)
OS_FINDINGS: FULL
OD_FINDINGS: FULL

## 2023-03-03 ASSESSMENT — KERATOMETRY
OD_AXISANGLE_DEGREES: 001
OD_K1POWER_DIOPTERS: 43.25
OS_AXISANGLE_DEGREES: 176
OS_K2POWER_DIOPTERS: 45.50
OS_K1POWER_DIOPTERS: 44.50
METHOD_AUTO_MANUAL: AUTO
OD_K2POWER_DIOPTERS: 45.00

## 2023-03-03 ASSESSMENT — TONOMETRY
OD_IOP_MMHG: 12
OS_IOP_MMHG: 12
OS_IOP_MMHG: 17
OD_IOP_MMHG: 19

## 2023-03-03 ASSESSMENT — DECREASING TEAR LAKE - SEVERITY SCORE
OD_DEC_TEARLAKE: T
OS_DEC_TEARLAKE: T

## 2023-04-17 ENCOUNTER — APPOINTMENT (OUTPATIENT)
Dept: DERMATOLOGY | Facility: CLINIC | Age: 83
End: 2023-04-17
Payer: MEDICARE

## 2023-04-17 PROCEDURE — 11900 INJECT SKIN LESIONS </W 7: CPT

## 2023-04-17 PROCEDURE — 99214 OFFICE O/P EST MOD 30 MIN: CPT | Mod: 25

## 2023-04-17 NOTE — ASSESSMENT
[FreeTextEntry1] : NUB pilar cyst\par inflamed\par plan for excision\par \par Risks and benefits were discussed including including atrophy, discoloration\par Intralesional triamcinolone, concentration 2.5  mg/cc;\par Total volume   0.1   cc\par Sites: 1\par \par f/u 1 month

## 2023-04-17 NOTE — HISTORY OF PRESENT ILLNESS
[FreeTextEntry1] : growth [de-identified] : 82 year old female here with growth on posterior scalp. enlarging.

## 2023-05-12 ENCOUNTER — APPOINTMENT (OUTPATIENT)
Dept: INTERNAL MEDICINE | Facility: CLINIC | Age: 83
End: 2023-05-12
Payer: MEDICARE

## 2023-05-12 ENCOUNTER — NON-APPOINTMENT (OUTPATIENT)
Age: 83
End: 2023-05-12

## 2023-05-12 VITALS
WEIGHT: 151 LBS | BODY MASS INDEX: 22.88 KG/M2 | TEMPERATURE: 98.4 F | OXYGEN SATURATION: 98 % | SYSTOLIC BLOOD PRESSURE: 120 MMHG | HEIGHT: 68 IN | DIASTOLIC BLOOD PRESSURE: 70 MMHG | HEART RATE: 62 BPM

## 2023-05-12 LAB
25(OH)D3 SERPL-MCNC: 42.7 NG/ML
ALBUMIN SERPL ELPH-MCNC: 4.4 G/DL
ALP BLD-CCNC: 88 U/L
ALT SERPL-CCNC: 11 U/L
ANION GAP SERPL CALC-SCNC: 11 MMOL/L
APPEARANCE: CLEAR
AST SERPL-CCNC: 16 U/L
BASOPHILS # BLD AUTO: 0.09 K/UL
BASOPHILS NFR BLD AUTO: 1.3 %
BILIRUB SERPL-MCNC: 0.4 MG/DL
BILIRUBIN URINE: NEGATIVE
BLOOD URINE: NEGATIVE
BUN SERPL-MCNC: 15 MG/DL
CALCIUM SERPL-MCNC: 9.9 MG/DL
CHLORIDE SERPL-SCNC: 106 MMOL/L
CHOLEST SERPL-MCNC: 212 MG/DL
CO2 SERPL-SCNC: 25 MMOL/L
COLOR: YELLOW
CREAT SERPL-MCNC: 0.93 MG/DL
EGFR: 61 ML/MIN/1.73M2
EOSINOPHIL # BLD AUTO: 0.3 K/UL
EOSINOPHIL NFR BLD AUTO: 4.5 %
ESTIMATED AVERAGE GLUCOSE: 131 MG/DL
GLUCOSE QUALITATIVE U: NEGATIVE MG/DL
GLUCOSE SERPL-MCNC: 101 MG/DL
HBA1C MFR BLD HPLC: 6.2 %
HCT VFR BLD CALC: 44.6 %
HDLC SERPL-MCNC: 75 MG/DL
HGB BLD-MCNC: 13.8 G/DL
IMM GRANULOCYTES NFR BLD AUTO: 0.1 %
KETONES URINE: NEGATIVE MG/DL
LDLC SERPL CALC-MCNC: 125 MG/DL
LEUKOCYTE ESTERASE URINE: NEGATIVE
LYMPHOCYTES # BLD AUTO: 1.17 K/UL
LYMPHOCYTES NFR BLD AUTO: 17.5 %
MAN DIFF?: NORMAL
MCHC RBC-ENTMCNC: 28.8 PG
MCHC RBC-ENTMCNC: 30.9 GM/DL
MCV RBC AUTO: 93.1 FL
MONOCYTES # BLD AUTO: 0.47 K/UL
MONOCYTES NFR BLD AUTO: 7 %
NEUTROPHILS # BLD AUTO: 4.65 K/UL
NEUTROPHILS NFR BLD AUTO: 69.6 %
NITRITE URINE: NEGATIVE
NONHDLC SERPL-MCNC: 138 MG/DL
PH URINE: 6.5
PLATELET # BLD AUTO: 543 K/UL
POTASSIUM SERPL-SCNC: 5.4 MMOL/L
PROT SERPL-MCNC: 6.9 G/DL
PROTEIN URINE: NEGATIVE MG/DL
RBC # BLD: 4.79 M/UL
RBC # FLD: 15.1 %
SODIUM SERPL-SCNC: 142 MMOL/L
SPECIFIC GRAVITY URINE: 1.01
TRIGL SERPL-MCNC: 65 MG/DL
TSH SERPL-ACNC: 2.41 UIU/ML
UROBILINOGEN URINE: 0.2 MG/DL
WBC # FLD AUTO: 6.69 K/UL

## 2023-05-12 PROCEDURE — 99214 OFFICE O/P EST MOD 30 MIN: CPT | Mod: 25

## 2023-05-12 PROCEDURE — 93000 ELECTROCARDIOGRAM COMPLETE: CPT | Mod: 59

## 2023-05-15 ENCOUNTER — APPOINTMENT (OUTPATIENT)
Dept: DERMATOLOGY | Facility: CLINIC | Age: 83
End: 2023-05-15
Payer: MEDICARE

## 2023-05-15 DIAGNOSIS — M79.605 PAIN IN LEFT LEG: ICD-10-CM

## 2023-05-15 DIAGNOSIS — L82.1 OTHER SEBORRHEIC KERATOSIS: ICD-10-CM

## 2023-05-15 DIAGNOSIS — Z86.39 PERSONAL HISTORY OF OTHER ENDOCRINE, NUTRITIONAL AND METABOLIC DISEASE: ICD-10-CM

## 2023-05-15 DIAGNOSIS — H61.23 IMPACTED CERUMEN, BILATERAL: ICD-10-CM

## 2023-05-15 DIAGNOSIS — Z87.2 PERSONAL HISTORY OF DISEASES OF THE SKIN AND SUBCUTANEOUS TISSUE: ICD-10-CM

## 2023-05-15 DIAGNOSIS — Z86.018 PERSONAL HISTORY OF OTHER BENIGN NEOPLASM: ICD-10-CM

## 2023-05-15 DIAGNOSIS — M54.50 LOW BACK PAIN, UNSPECIFIED: ICD-10-CM

## 2023-05-15 DIAGNOSIS — Z92.89 PERSONAL HISTORY OF OTHER MEDICAL TREATMENT: ICD-10-CM

## 2023-05-15 DIAGNOSIS — Z87.898 PERSONAL HISTORY OF OTHER SPECIFIED CONDITIONS: ICD-10-CM

## 2023-05-15 DIAGNOSIS — M71.9 BURSOPATHY, UNSPECIFIED: ICD-10-CM

## 2023-05-15 DIAGNOSIS — L72.3 SEBACEOUS CYST: ICD-10-CM

## 2023-05-15 DIAGNOSIS — R31.29 OTHER MICROSCOPIC HEMATURIA: ICD-10-CM

## 2023-05-15 DIAGNOSIS — L30.9 DERMATITIS, UNSPECIFIED: ICD-10-CM

## 2023-05-15 DIAGNOSIS — M67.912 BURSOPATHY, UNSPECIFIED: ICD-10-CM

## 2023-05-15 DIAGNOSIS — M75.02 ADHESIVE CAPSULITIS OF LEFT SHOULDER: ICD-10-CM

## 2023-05-15 DIAGNOSIS — M25.519 PAIN IN UNSPECIFIED SHOULDER: ICD-10-CM

## 2023-05-15 PROCEDURE — 11423 EXC H-F-NK-SP B9+MARG 2.1-3: CPT

## 2023-05-15 PROCEDURE — 13121 CMPLX RPR S/A/L 2.6-7.5 CM: CPT

## 2023-05-15 NOTE — PROCEDURE
[___ cm] : [unfilled] cm [___ ml of 1% lidocaine w/ 1:100,000 epinephrine] : [unfilled] ml of 1% lidocaine with 1:100,000 epinephrine [Pressure] : pressure [5-0] : 5-0 Prolene [____ cm] : [unfilled] cm [____ days] : [unfilled] days [FreeTextEntry1] : Tonio Dumont [TWNoteComboBox1] : Epidermal Inclusion Cyst [TWNoteComboBox4] : Epidermal Inclusion Cyst [TWNoteComboBox3] : Subcutaneous fat

## 2023-05-15 NOTE — HISTORY OF PRESENT ILLNESS
[FreeTextEntry1] : excision [de-identified] : 82 year old female here for excision of enlarging cyst.

## 2023-05-16 NOTE — HISTORY OF PRESENT ILLNESS
[FreeTextEntry1] : FU [de-identified] : ANA DURAN is a 82 year F who comes in for a follow up visit.\par Pt notes for the last few months she feels chest tightness in upper chest area when climbing stairs or even walking with some associated shortness of breath, occurs at times when eating and when not eating as well. No associated nausea/vomiting, palpitations, diaphoresis. \par She had labs done recently which we reviewed today. CBC shows thrombocytosis at 543 and K at 5.4. \par Patient denies any cp, sob,abdominal pain, nausea, vomiting, palpitations, fever, chills, constipation, diarrhea.\par

## 2023-05-16 NOTE — ASSESSMENT
[FreeTextEntry1] : 1.Chest tightness: EKG stable in office, advised cardiology evaluation due to symptoms and will likely need echo and stress test. \par Discussed signs and symptoms to warrant urgent evaluation with patient.\par \par 2.Hyperkalemia: mild, advised low K diet and repeat bmp in 2 weeks. \par \par 3.Thrombocytosis: recheck CBC in 2 weeks, if plt still high will need heme/onc evaluation. \par \par 4.prediabetes: hba1c higher, Pt advised to keep diabetic diet, decrease carbs and increase dietary protein intake.\par Exercise as tolerated 3-4 times a week.\par

## 2023-05-18 ENCOUNTER — APPOINTMENT (OUTPATIENT)
Dept: DERMATOLOGY | Facility: CLINIC | Age: 83
End: 2023-05-18
Payer: MEDICARE

## 2023-05-18 PROCEDURE — 99213 OFFICE O/P EST LOW 20 MIN: CPT | Mod: 24

## 2023-05-18 NOTE — ASSESSMENT
[FreeTextEntry1] : R/o Cellulitis.\par ? strictly edema, vs. persistent cyst at the posterior aspect.\par Tx with ceftin 250mg bid x 1 week.\par f/u as scheduled for suture removal, and recommend re-evaluation of the site at that time.

## 2023-05-18 NOTE — PHYSICAL EXAM
[FreeTextEntry3] : Left vertex of scalp with sutures in place.  Mild erythema surrounding incision, mild edema, with nodule of the posterior aspect, and marked tenderness.

## 2023-05-18 NOTE — HISTORY OF PRESENT ILLNESS
[FreeTextEntry1] : Fit in to check scalp s/p excision of pilar cyst. [de-identified] : She notes marked tenderness of the site, progressing over the past day.

## 2023-05-21 ENCOUNTER — RESULT CHARGE (OUTPATIENT)
Age: 83
End: 2023-05-21

## 2023-05-22 ENCOUNTER — NON-APPOINTMENT (OUTPATIENT)
Age: 83
End: 2023-05-22

## 2023-05-22 ENCOUNTER — APPOINTMENT (OUTPATIENT)
Dept: DERMATOLOGY | Facility: CLINIC | Age: 83
End: 2023-05-22
Payer: MEDICARE

## 2023-05-22 PROCEDURE — 99024 POSTOP FOLLOW-UP VISIT: CPT

## 2023-05-22 RX ORDER — LORATADINE 10 MG/1
10 TABLET ORAL
Refills: 0 | Status: ACTIVE | COMMUNITY

## 2023-05-22 NOTE — ASSESSMENT
[FreeTextEntry1] : sutures removed\par culture taken\par c/w Keflex; SED pending culture results\par pus and blood drained; area and symptoms improved with drainage \par silver nitrate applied for hemostasis\par \par f/u 3 weeks\par \par \par \par

## 2023-05-22 NOTE — HISTORY OF PRESENT ILLNESS
[FreeTextEntry1] : suture removal [de-identified] : Patient here for suture removal. area appears very tender and fluid filled.

## 2023-05-25 ENCOUNTER — NON-APPOINTMENT (OUTPATIENT)
Age: 83
End: 2023-05-25

## 2023-05-25 ENCOUNTER — APPOINTMENT (OUTPATIENT)
Dept: CARDIOLOGY | Facility: CLINIC | Age: 83
End: 2023-05-25
Payer: MEDICARE

## 2023-05-25 VITALS
DIASTOLIC BLOOD PRESSURE: 84 MMHG | HEIGHT: 68 IN | HEART RATE: 65 BPM | WEIGHT: 150 LBS | SYSTOLIC BLOOD PRESSURE: 145 MMHG | OXYGEN SATURATION: 98 % | BODY MASS INDEX: 22.73 KG/M2

## 2023-05-25 VITALS — SYSTOLIC BLOOD PRESSURE: 135 MMHG | DIASTOLIC BLOOD PRESSURE: 82 MMHG

## 2023-05-25 PROCEDURE — 99205 OFFICE O/P NEW HI 60 MIN: CPT

## 2023-05-25 PROCEDURE — 93000 ELECTROCARDIOGRAM COMPLETE: CPT

## 2023-05-25 RX ORDER — CEFUROXIME AXETIL 250 MG/1
250 TABLET ORAL
Qty: 14 | Refills: 0 | Status: DISCONTINUED | COMMUNITY
Start: 2023-05-18 | End: 2023-05-25

## 2023-05-25 NOTE — ASSESSMENT
[FreeTextEntry1] : A/P:\par \par *Chest pain-atypical\par -suspect musculskeletal\par \par -Echo\par -EST\par \par Return after testing to review results.

## 2023-05-25 NOTE — HISTORY OF PRESENT ILLNESS
[FreeTextEntry1] : ANA DURAN (ANA)1940(82y)F\par \par "HTN, NPA"\par reason for visit on review of records is chest discomfort.\par \par Reviewed records.\par Seen by Dr. Jade May 12th for routine followup.\par " notes for the last few months she feels chest tightness in upper chest area when climbing stairs or even walking with some associated shortness of breath, occurs at times"\par "A/P:\par 1.Chest tightness: EKG stable in office, advised cardiology evaluation due to symptoms and will likely need echo and stress test. "\par \par 81 y/o w/ no prior cardiac history.\par Pressure sensation over past few months in lower neck.\par Occurs once a week. duration 3 seconds.  Occurs w/ activity or otherwise.\par Walks up stairs daily does not get discomfort w/ daily stairs.\par No increase in frequency or severity, no changes w/ eating or palpation\par or position or breathing.   No dyspnea, lightheadness/syncope.\par Occasional palps 1-2x per month lasting 20-30 seconds.\par \par no famhx cardiac disease.\par \par sochx neg other than social ETOH.\par \par Reviewed '17 cardio note, "echo unchanged form prior\par preserved EF mild bivalv insuff of MV & AV."\par "I recommend cardiac PET".  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

## 2023-05-30 LAB — BACTERIA WND CULT: ABNORMAL

## 2023-06-01 ENCOUNTER — NON-APPOINTMENT (OUTPATIENT)
Age: 83
End: 2023-06-01

## 2023-06-06 LAB
ANION GAP SERPL CALC-SCNC: 10 MMOL/L
BUN SERPL-MCNC: 18 MG/DL
CALCIUM SERPL-MCNC: 10 MG/DL
CHLORIDE SERPL-SCNC: 107 MMOL/L
CO2 SERPL-SCNC: 26 MMOL/L
CREAT SERPL-MCNC: 0.96 MG/DL
EGFR: 59 ML/MIN/1.73M2
GLUCOSE SERPL-MCNC: 98 MG/DL
POTASSIUM SERPL-SCNC: 4.8 MMOL/L
SODIUM SERPL-SCNC: 143 MMOL/L

## 2023-06-14 ENCOUNTER — OUTPATIENT (OUTPATIENT)
Dept: OUTPATIENT SERVICES | Facility: HOSPITAL | Age: 83
LOS: 1 days | Discharge: HOME | End: 2023-06-14

## 2023-06-14 DIAGNOSIS — Z90.89 ACQUIRED ABSENCE OF OTHER ORGANS: Chronic | ICD-10-CM

## 2023-06-14 DIAGNOSIS — Z41.9 ENCOUNTER FOR PROCEDURE FOR PURPOSES OTHER THAN REMEDYING HEALTH STATE, UNSPECIFIED: Chronic | ICD-10-CM

## 2023-06-14 DIAGNOSIS — Z98.890 OTHER SPECIFIED POSTPROCEDURAL STATES: Chronic | ICD-10-CM

## 2023-06-14 DIAGNOSIS — C75.8: ICD-10-CM

## 2023-06-16 ENCOUNTER — RESULT REVIEW (OUTPATIENT)
Age: 83
End: 2023-06-16

## 2023-06-16 ENCOUNTER — APPOINTMENT (OUTPATIENT)
Dept: HEMATOLOGY ONCOLOGY | Facility: CLINIC | Age: 83
End: 2023-06-16
Payer: MEDICARE

## 2023-06-16 VITALS
TEMPERATURE: 98.2 F | RESPIRATION RATE: 17 BRPM | SYSTOLIC BLOOD PRESSURE: 154 MMHG | BODY MASS INDEX: 23.13 KG/M2 | DIASTOLIC BLOOD PRESSURE: 77 MMHG | OXYGEN SATURATION: 98 % | HEART RATE: 76 BPM | WEIGHT: 152.13 LBS

## 2023-06-16 DIAGNOSIS — D75.839 THROMBOCYTOSIS, UNSPECIFIED: ICD-10-CM

## 2023-06-16 LAB
BASOPHILS # BLD AUTO: 0.1 K/UL — SIGNIFICANT CHANGE UP (ref 0–0.2)
BASOPHILS NFR BLD AUTO: 1.3 % — SIGNIFICANT CHANGE UP (ref 0–2)
EOSINOPHIL # BLD AUTO: 0.2 K/UL — SIGNIFICANT CHANGE UP (ref 0–0.5)
EOSINOPHIL NFR BLD AUTO: 2.6 % — SIGNIFICANT CHANGE UP (ref 0–6)
HCT VFR BLD CALC: 41.2 % — SIGNIFICANT CHANGE UP (ref 34.5–45)
HGB BLD-MCNC: 13.4 G/DL — SIGNIFICANT CHANGE UP (ref 11.5–15.5)
IMM GRANULOCYTES NFR BLD AUTO: 0.4 % — SIGNIFICANT CHANGE UP (ref 0–0.9)
LYMPHOCYTES # BLD AUTO: 1.6 K/UL — SIGNIFICANT CHANGE UP (ref 1–3.3)
LYMPHOCYTES # BLD AUTO: 20.5 % — SIGNIFICANT CHANGE UP (ref 13–44)
MCHC RBC-ENTMCNC: 30.2 PG — SIGNIFICANT CHANGE UP (ref 27–34)
MCHC RBC-ENTMCNC: 32.5 GM/DL — SIGNIFICANT CHANGE UP (ref 32–36)
MCV RBC AUTO: 92.8 FL — SIGNIFICANT CHANGE UP (ref 80–100)
MONOCYTES # BLD AUTO: 0.52 K/UL — SIGNIFICANT CHANGE UP (ref 0–0.9)
MONOCYTES NFR BLD AUTO: 6.7 % — SIGNIFICANT CHANGE UP (ref 2–14)
NEUTROPHILS # BLD AUTO: 5.36 K/UL — SIGNIFICANT CHANGE UP (ref 1.8–7.4)
NEUTROPHILS NFR BLD AUTO: 68.5 % — SIGNIFICANT CHANGE UP (ref 43–77)
NRBC # BLD: 0 /100 WBCS — SIGNIFICANT CHANGE UP (ref 0–0)
PLATELET # BLD AUTO: 513 K/UL — HIGH (ref 150–400)
RBC # BLD: 4.44 M/UL — SIGNIFICANT CHANGE UP (ref 3.8–5.2)
RBC # FLD: 15.3 % — HIGH (ref 10.3–14.5)
WBC # BLD: 7.81 K/UL — SIGNIFICANT CHANGE UP (ref 3.8–10.5)
WBC # FLD AUTO: 7.81 K/UL — SIGNIFICANT CHANGE UP (ref 3.8–10.5)

## 2023-06-16 PROCEDURE — 99204 OFFICE O/P NEW MOD 45 MIN: CPT

## 2023-06-17 LAB
ERYTHROCYTE [SEDIMENTATION RATE] IN BLOOD: 6 MM/HR — SIGNIFICANT CHANGE UP (ref 0–20)
FERRITIN SERPL-MCNC: 192 NG/ML — HIGH (ref 15–150)
IRON SATN MFR SERPL: 16 % — SIGNIFICANT CHANGE UP (ref 14–50)
IRON SATN MFR SERPL: 51 UG/DL — SIGNIFICANT CHANGE UP (ref 30–160)
TIBC SERPL-MCNC: 315 UG/DL — SIGNIFICANT CHANGE UP (ref 220–430)
UIBC SERPL-MCNC: 264 UG/DL — SIGNIFICANT CHANGE UP (ref 110–370)

## 2023-06-19 ENCOUNTER — APPOINTMENT (OUTPATIENT)
Dept: DERMATOLOGY | Facility: CLINIC | Age: 83
End: 2023-06-19
Payer: MEDICARE

## 2023-06-19 PROCEDURE — 11422 EXC H-F-NK-SP B9+MARG 1.1-2: CPT

## 2023-06-19 PROCEDURE — 12031 INTMD RPR S/A/T/EXT 2.5 CM/<: CPT

## 2023-06-19 NOTE — HISTORY OF PRESENT ILLNESS
[de-identified] : 82 year old s/p excsion and infection of pilar cyst. recurred. [FreeTextEntry1] : growth in scalp

## 2023-06-19 NOTE — PROCEDURE
[___ cm] : [unfilled] cm [___ ml of 1% lidocaine w/ 1:100,000 epinephrine] : [unfilled] ml of 1% lidocaine with 1:100,000 epinephrine [Pressure] : pressure [Other: ____] : [unfilled] [5-0] : 5-0  Fast absorbing gut [____ cm] : [unfilled] cm [____ days] : [unfilled] days [FreeTextEntry1] : Tonio Dumont [FreeTextEntry7] : posterior scalp [TWNoteComboBox3] : Subcutaneous fat

## 2023-06-19 NOTE — ASSESSMENT
[FreeTextEntry1] : re-excise removal today\par hemostasis with sutures and silver nitrate to help with granulation tissue\par \par f/u 6 weeks

## 2023-06-27 LAB — CORE LAB BIOPSY: NORMAL

## 2023-06-29 ENCOUNTER — APPOINTMENT (OUTPATIENT)
Dept: CARDIOLOGY | Facility: CLINIC | Age: 83
End: 2023-06-29
Payer: MEDICARE

## 2023-06-29 PROCEDURE — 93306 TTE W/DOPPLER COMPLETE: CPT

## 2023-07-24 ENCOUNTER — APPOINTMENT (OUTPATIENT)
Dept: DERMATOLOGY | Facility: CLINIC | Age: 83
End: 2023-07-24
Payer: MEDICARE

## 2023-07-24 DIAGNOSIS — L72.11 PILAR CYST: ICD-10-CM

## 2023-07-24 DIAGNOSIS — D48.5 NEOPLASM OF UNCERTAIN BEHAVIOR OF SKIN: ICD-10-CM

## 2023-07-24 PROCEDURE — 99213 OFFICE O/P EST LOW 20 MIN: CPT | Mod: 25

## 2023-07-24 PROCEDURE — 10120 INC&RMVL FB SUBQ TISS SMPL: CPT

## 2023-07-24 NOTE — HISTORY OF PRESENT ILLNESS
[FreeTextEntry1] : f/u wound [de-identified] : 82 year old f/u wound s/p excision of pilar cyst complicated by infection.

## 2023-07-24 NOTE — ASSESSMENT
[FreeTextEntry1] : pilar cyst\par healing\par foreign body removed with 11 blade \par monsels and mupirocin placed\par \par f/u PRN

## 2023-07-24 NOTE — PHYSICAL EXAM
[FreeTextEntry3] : AAOx3, pleasant, NAD, no visual lymphadenopathy\par hair, scalp, face, nose, eyelids, ears, lips, oropharynx, neck, chest, abdomen, back, right arm, left arm, nails, and hands examined with all normal findings,\par pertinent findings include:\par \par scab with excess hairs inside

## 2023-07-26 ENCOUNTER — APPOINTMENT (OUTPATIENT)
Dept: CARDIOLOGY | Facility: CLINIC | Age: 83
End: 2023-07-26
Payer: MEDICARE

## 2023-07-26 VITALS
WEIGHT: 152 LBS | SYSTOLIC BLOOD PRESSURE: 162 MMHG | HEIGHT: 68 IN | HEART RATE: 63 BPM | DIASTOLIC BLOOD PRESSURE: 70 MMHG | OXYGEN SATURATION: 97 % | BODY MASS INDEX: 23.04 KG/M2

## 2023-07-26 DIAGNOSIS — I49.3 VENTRICULAR PREMATURE DEPOLARIZATION: ICD-10-CM

## 2023-07-26 PROCEDURE — 99214 OFFICE O/P EST MOD 30 MIN: CPT

## 2023-07-26 PROCEDURE — 93000 ELECTROCARDIOGRAM COMPLETE: CPT

## 2023-07-26 PROCEDURE — 99214 OFFICE O/P EST MOD 30 MIN: CPT | Mod: 25

## 2023-07-26 PROCEDURE — 93015 CV STRESS TEST SUPVJ I&R: CPT

## 2023-07-26 PROCEDURE — 93000 ELECTROCARDIOGRAM COMPLETE: CPT | Mod: 59

## 2023-07-26 PROCEDURE — 93246 EXT ECG>7D<15D RECORDING: CPT

## 2023-07-26 RX ORDER — SULFAMETHOXAZOLE AND TRIMETHOPRIM 800; 160 MG/1; MG/1
800-160 TABLET ORAL DAILY
Qty: 14 | Refills: 0 | Status: DISCONTINUED | COMMUNITY
Start: 2023-05-25 | End: 2023-07-26

## 2023-08-04 ENCOUNTER — RESULT REVIEW (OUTPATIENT)
Age: 83
End: 2023-08-04

## 2023-08-04 ENCOUNTER — APPOINTMENT (OUTPATIENT)
Dept: HEMATOLOGY ONCOLOGY | Facility: CLINIC | Age: 83
End: 2023-08-04
Payer: MEDICARE

## 2023-08-04 VITALS
TEMPERATURE: 98 F | HEART RATE: 54 BPM | WEIGHT: 151.44 LBS | DIASTOLIC BLOOD PRESSURE: 78 MMHG | SYSTOLIC BLOOD PRESSURE: 164 MMHG | HEIGHT: 68 IN | RESPIRATION RATE: 18 BRPM | BODY MASS INDEX: 22.95 KG/M2 | OXYGEN SATURATION: 99 %

## 2023-08-04 LAB
BASOPHILS # BLD AUTO: 0.06 K/UL — SIGNIFICANT CHANGE UP (ref 0–0.2)
BASOPHILS NFR BLD AUTO: 0.7 % — SIGNIFICANT CHANGE UP (ref 0–2)
EOSINOPHIL # BLD AUTO: 0.22 K/UL — SIGNIFICANT CHANGE UP (ref 0–0.5)
EOSINOPHIL NFR BLD AUTO: 2.5 % — SIGNIFICANT CHANGE UP (ref 0–6)
HCT VFR BLD CALC: 39.2 % — SIGNIFICANT CHANGE UP (ref 34.5–45)
HGB BLD-MCNC: 12.7 G/DL — SIGNIFICANT CHANGE UP (ref 11.5–15.5)
IMM GRANULOCYTES NFR BLD AUTO: 0.3 % — SIGNIFICANT CHANGE UP (ref 0–0.9)
LYMPHOCYTES # BLD AUTO: 1.35 K/UL — SIGNIFICANT CHANGE UP (ref 1–3.3)
LYMPHOCYTES # BLD AUTO: 15.6 % — SIGNIFICANT CHANGE UP (ref 13–44)
MCHC RBC-ENTMCNC: 29.9 PG — SIGNIFICANT CHANGE UP (ref 27–34)
MCHC RBC-ENTMCNC: 32.4 GM/DL — SIGNIFICANT CHANGE UP (ref 32–36)
MCV RBC AUTO: 92.2 FL — SIGNIFICANT CHANGE UP (ref 80–100)
MONOCYTES # BLD AUTO: 0.63 K/UL — SIGNIFICANT CHANGE UP (ref 0–0.9)
MONOCYTES NFR BLD AUTO: 7.3 % — SIGNIFICANT CHANGE UP (ref 2–14)
NEUTROPHILS # BLD AUTO: 6.36 K/UL — SIGNIFICANT CHANGE UP (ref 1.8–7.4)
NEUTROPHILS NFR BLD AUTO: 73.6 % — SIGNIFICANT CHANGE UP (ref 43–77)
NRBC # BLD: 0 /100 WBCS — SIGNIFICANT CHANGE UP (ref 0–0)
PLATELET # BLD AUTO: 501 K/UL — HIGH (ref 150–400)
RBC # BLD: 4.25 M/UL — SIGNIFICANT CHANGE UP (ref 3.8–5.2)
RBC # FLD: 15.3 % — HIGH (ref 10.3–14.5)
WBC # BLD: 8.65 K/UL — SIGNIFICANT CHANGE UP (ref 3.8–10.5)
WBC # FLD AUTO: 8.65 K/UL — SIGNIFICANT CHANGE UP (ref 3.8–10.5)

## 2023-08-04 PROCEDURE — 99214 OFFICE O/P EST MOD 30 MIN: CPT

## 2023-08-10 ENCOUNTER — APPOINTMENT (OUTPATIENT)
Dept: CT IMAGING | Facility: CLINIC | Age: 83
End: 2023-08-10
Payer: MEDICARE

## 2023-08-10 PROCEDURE — 75574 CT ANGIO HRT W/3D IMAGE: CPT

## 2023-08-16 NOTE — ASSESSMENT
[FreeTextEntry1] : A/P  *atypical chest pain -likely due to PVCs - noted during recovery period during exercise testing no symptoms during testing. -However EST w/ suboptimal target HR & artifact limited by dyspnea. cannot perform EST to adequate level. Dyspnea may be anginal equivalent.  -lexiscan nuclear stress test ordered. -epatch for PVC burden.  Return after testing to review results. ===================== 8/1/'23 addendum.  Alerted by arianna re: epatch results. Afib event at 240a on 7/29/'23 noted, duration 2.5 hrs. fastest rate [address] ave rates appear to be 125  Called & reviewd results w/ pt. she reports feeling "heart burn" for 15 minutes at onset of arrhythmia then went  sleep shortly after.  I discussed w/ her & perscribed xarelto 20 mg daily & metoprolol 12.5 mg BID po.  She reports chest discomfort intermittent not related to exertion but more frequent. I cancelled nuclear scan & ordered CT coronary angio. Provided her w/ my cell phone to review results. If symptoms worsen in frequency or severity advised her to report to ED. ====================== 8/16/'23 addendum  Reviewed results of coronary CT angio: significant severe stenosis of the mid left anterior descending. Called pt & reviewed symptoms. Reports chest discomfort over past few months. 1-2 times a day no relation to activity but related to anxiety. lasting seconds. Reports h/o thrombocytopenia followed by Dr. Rodriguez who d/c'd ASA. Reccomend cardiac cath in the next 1-2 weeks. Will prescribe imdur 30 mg daily Will d/w Dr. Rodriguez to determine if plavix may be prescribed given thrombocytopenia. xarelto will have to be d/c'd for 48 hrs prior to cath.  Billy contact pt after discussion w/ Dr. Rodriguez will plan for cath in the next 1-2 weeks. Pt instructed to report to ED if her chest pain worsens in frequency or severity. ================= 8/16/'23: Spoke w/ BEsperanza waggoner covering for Dr. Rodriguez Hem--Onc. 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 Per MARCUS Orellana to start ASA & transition to plavix  if needed based on results of cath.

## 2023-08-16 NOTE — HISTORY OF PRESENT ILLNESS
[FreeTextEntry1] : ANA DURAN [id number] RPA 15 FU; HAD TTE AND STRESS TEST \par \par last visit was for chest pain. atypical pain.\par here for followup.\par \par Testing reviewed:\par Echo Jun '23:  EF 60-65% mild LAE, mild MR severe TR mod pul HTN\par \par Cardiac PET Sep '19: normal perfusion EF normal no TID\par \par EST today Jul '23 at 1p artifact at peak exercise in leads I, II, aVF no chest pain during treadmill.\par no clear ischemia, in recovery PVCs noted for 1-2 tracing & pt reported symptoms.\par Did not reach target HR 84% b/c felt dyspneic.\par \par No pressure type discomfort since last visit until PVCs noted during recovery.

## 2023-08-22 RX ORDER — DILTIAZEM HCL 120 MG
1 CAPSULE, EXT RELEASE 24 HR ORAL
Refills: 0 | DISCHARGE

## 2023-08-22 RX ORDER — PSEUDOEPHEDRINE HCL 30 MG
1 TABLET ORAL
Qty: 0 | Refills: 0 | DISCHARGE

## 2023-08-22 RX ORDER — DORZOLAMIDE HYDROCHLORIDE TIMOLOL MALEATE 20; 5 MG/ML; MG/ML
1 SOLUTION/ DROPS OPHTHALMIC
Qty: 0 | Refills: 0 | DISCHARGE

## 2023-08-22 RX ORDER — PREGABALIN 225 MG/1
1 CAPSULE ORAL
Qty: 0 | Refills: 0 | DISCHARGE

## 2023-08-22 RX ORDER — LORATADINE 10 MG/1
1 TABLET ORAL
Qty: 0 | Refills: 0 | DISCHARGE

## 2023-08-22 RX ORDER — EVOLOCUMAB 140 MG/ML
140 INJECTION, SOLUTION SUBCUTANEOUS
Refills: 0 | DISCHARGE

## 2023-08-22 RX ORDER — CANDESARTAN CILEXETIL 8 MG/1
1 TABLET ORAL
Refills: 0 | DISCHARGE

## 2023-08-22 RX ORDER — PITAVASTATIN CALCIUM 1.04 MG/1
1 TABLET, FILM COATED ORAL
Refills: 0 | DISCHARGE

## 2023-08-22 RX ORDER — ASCORBIC ACID 60 MG
1 TABLET,CHEWABLE ORAL
Qty: 0 | Refills: 0 | DISCHARGE

## 2023-08-22 RX ORDER — MULTIVIT-MIN/FERROUS GLUCONATE 9 MG/15 ML
1 LIQUID (ML) ORAL
Qty: 0 | Refills: 0 | DISCHARGE

## 2023-08-22 RX ORDER — VENLAFAXINE HCL 75 MG
1 CAPSULE, EXT RELEASE 24 HR ORAL
Refills: 0 | DISCHARGE

## 2023-08-22 NOTE — H&P ADULT - NSICDXPASTMEDICALHX_GEN_ALL_CORE_FT
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

## 2023-08-22 NOTE — H&P ADULT - HISTORY OF PRESENT ILLNESS
82yr old female PMH HTN, aflutter, PVC's, valvular heart disease with c/o chest pain. Pt. had a stress test  82yr old female PMH HTN, thrombocythemia, PAF ( Xarelto last dose 8/20/23) presented to cardiology with c/o " a feeling" intermittently in her chest. Pt describes this as breathing in menthol. Unable to reach target HR on her stress test, pt had cardiac PET revealing severe stenosis of LAD.  Pt referred for cardiac cath for further evaluation.,

## 2023-08-22 NOTE — H&P ADULT - NSHPREVIEWOFSYSTEMS_GEN_ALL_CORE
REVIEW OF SYSTEMS:    CONSTITUTIONAL: + headache No fever, weight loss, chills, shakes, or fatigue  EYES: No eye pain, visual disturbances, or discharge  ENMT:  No difficulty hearing, tinnitus, vertigo; No sinus or throat pain  NECK: No pain or stiffness  RESPIRATORY: No cough, wheezing, hemoptysis, or shortness of breath  CARDIOVASCULAR: No chest pain, dyspnea, palpitations, dizziness, syncope, paroxysmal nocturnal dyspnea, orthopnea, or arm or leg swelling  GASTROINTESTINAL: No abdominal  or epigastric pain, nausea, vomiting, hematemesis, diarrhea, constipation, melena or bright red blood.  GENITOURINARY: No dysuria, nocturia, hematuria, or urinary incontinence  NEUROLOGICAL: No headaches, memory loss, slurred speech, limb weakness, loss of strength, numbness, or tremors  SKIN: No itching, burning, rashes, or lesions   MUSCULOSKELETAL: No joint pain or swelling, muscle, back, or extremity pain  PSYCHIATRIC: No depression, anxiety, or difficulty sleeping

## 2023-08-22 NOTE — H&P ADULT - NSICDXPASTSURGICALHX_GEN_ALL_CORE_FT
PAST SURGICAL HISTORY:  Elective surgery "vaginal tissue biopsy" 2000    H/O colonoscopy last done 2008?    History of tonsillectomy as a child

## 2023-08-22 NOTE — H&P ADULT - NSHPLABSRESULTS_GEN_ALL_CORE
8/23/23 EKG  Simeon rate 56    8/16/23 PET  severe stenosis of LAD    6/29/23 Echo  60-65%  severe tricuspid regurgitation  Moderate pulm htn

## 2023-08-22 NOTE — H&P ADULT - ASSESSMENT
82yr old female PMH HTN, thrombocythemia, PAF ( Xarelto last dose 8/20/23) presented to cardiology with c/o " a feeling" intermittently in her chest. Pt describes this as breathing in menthol. Unable to reach target HR on her stress test, pt had cardiac PET revealing severe stenosis of LAD.  Pt referred for cardiac cath for further evaluation.,     ASA class: II  Creatinine: 0.97  GFR: 58  Bleeding  Risk score: 3.2%  Ric Score 4 pts

## 2023-08-22 NOTE — H&P ADULT - NSICDXFAMILYHX_GEN_ALL_CORE_FT
FAMILY HISTORY:  Father  Still living? No  Family history of prostate cancer in father, Age at diagnosis: Age Unknown    Mother  Still living? No  Family history of glioblastoma, Age at diagnosis: Age Unknown    Sibling  Still living? Yes, Estimated age: Age Unknown  Family history of coronary artery disease, Age at diagnosis: Age Unknown  Family history of diabetes mellitus (DM), Age at diagnosis: Age Unknown  Family history of glioblastoma, Age at diagnosis: Age Unknown

## 2023-08-22 NOTE — ASU PATIENT PROFILE, ADULT - ABILITY TO HEAR (WITH HEARING AID OR HEARING APPLIANCE IF NORMALLY USED):
b/l hearing aids left at home/Mildly to Moderately Impaired: difficulty hearing in some environments or speaker may need to increase volume or speak distinctly

## 2023-08-22 NOTE — H&P ADULT - PROBLEM SELECTOR PLAN 1
a/w " weird feeling" in chest  C with possible PCI\  MARY prehydration NS 250cc bolus x 1    -Consent obtained for cardiac catheterization w/ coronary angiogram, possible sedation and/or analgesia and possible stent placement. Pt is competent, has capacity, and understands risks and benefits of procedure. Risks and benefits discussed. Risk discussed included, but not limited to MI, stroke, mortality, major bleeding, arrythmia, or infection. All questions answered

## 2023-08-22 NOTE — ASU PATIENT PROFILE, ADULT - MEDICATIONS TO TAKE
may take medications as prescribed with sips of water may take other medications as prescribed with sips of water

## 2023-08-23 ENCOUNTER — APPOINTMENT (OUTPATIENT)
Dept: HEMATOLOGY ONCOLOGY | Facility: CLINIC | Age: 83
End: 2023-08-23

## 2023-08-23 ENCOUNTER — OUTPATIENT (OUTPATIENT)
Dept: OUTPATIENT SERVICES | Facility: HOSPITAL | Age: 83
LOS: 1 days | Discharge: HOME | End: 2023-08-23
Payer: MEDICARE

## 2023-08-23 VITALS
TEMPERATURE: 98 F | DIASTOLIC BLOOD PRESSURE: 80 MMHG | OXYGEN SATURATION: 100 % | HEIGHT: 68 IN | HEART RATE: 58 BPM | WEIGHT: 149.91 LBS | RESPIRATION RATE: 18 BRPM | SYSTOLIC BLOOD PRESSURE: 188 MMHG

## 2023-08-23 DIAGNOSIS — R07.89 OTHER CHEST PAIN: ICD-10-CM

## 2023-08-23 DIAGNOSIS — R93.1 ABNORMAL FINDINGS ON DIAGNOSTIC IMAGING OF HEART AND CORONARY CIRCULATION: ICD-10-CM

## 2023-08-23 DIAGNOSIS — Z98.890 OTHER SPECIFIED POSTPROCEDURAL STATES: Chronic | ICD-10-CM

## 2023-08-23 DIAGNOSIS — Z41.9 ENCOUNTER FOR PROCEDURE FOR PURPOSES OTHER THAN REMEDYING HEALTH STATE, UNSPECIFIED: Chronic | ICD-10-CM

## 2023-08-23 DIAGNOSIS — Z90.89 ACQUIRED ABSENCE OF OTHER ORGANS: Chronic | ICD-10-CM

## 2023-08-23 LAB — BLD GP AB SCN SERPL QL: SIGNIFICANT CHANGE UP

## 2023-08-23 PROCEDURE — 86901 BLOOD TYPING SEROLOGIC RH(D): CPT

## 2023-08-23 PROCEDURE — 93010 ELECTROCARDIOGRAM REPORT: CPT | Mod: 76

## 2023-08-23 PROCEDURE — C1894: CPT

## 2023-08-23 PROCEDURE — C1725: CPT

## 2023-08-23 PROCEDURE — 92978 ENDOLUMINL IVUS OCT C 1ST: CPT | Mod: 26,LD

## 2023-08-23 PROCEDURE — 93458 L HRT ARTERY/VENTRICLE ANGIO: CPT | Mod: 59

## 2023-08-23 PROCEDURE — 36415 COLL VENOUS BLD VENIPUNCTURE: CPT

## 2023-08-23 PROCEDURE — 92978 ENDOLUMINL IVUS OCT C 1ST: CPT | Mod: LD

## 2023-08-23 PROCEDURE — 0715T: CPT

## 2023-08-23 PROCEDURE — 92928 PRQ TCAT PLMT NTRAC ST 1 LES: CPT | Mod: LD

## 2023-08-23 PROCEDURE — 86850 RBC ANTIBODY SCREEN: CPT

## 2023-08-23 PROCEDURE — C1769: CPT

## 2023-08-23 PROCEDURE — 85025 COMPLETE CBC W/AUTO DIFF WBC: CPT

## 2023-08-23 PROCEDURE — C9600: CPT | Mod: LD

## 2023-08-23 PROCEDURE — C1874: CPT

## 2023-08-23 PROCEDURE — C1887: CPT

## 2023-08-23 PROCEDURE — 80048 BASIC METABOLIC PNL TOTAL CA: CPT

## 2023-08-23 PROCEDURE — 99152 MOD SED SAME PHYS/QHP 5/>YRS: CPT

## 2023-08-23 PROCEDURE — 93458 L HRT ARTERY/VENTRICLE ANGIO: CPT | Mod: 26,59

## 2023-08-23 PROCEDURE — C1753: CPT

## 2023-08-23 PROCEDURE — 86900 BLOOD TYPING SEROLOGIC ABO: CPT

## 2023-08-23 PROCEDURE — 93005 ELECTROCARDIOGRAM TRACING: CPT

## 2023-08-23 PROCEDURE — C1761: CPT

## 2023-08-23 RX ORDER — RIVAROXABAN 15 MG-20MG
20 KIT ORAL DAILY
Refills: 0 | Status: DISCONTINUED | OUTPATIENT
Start: 2023-08-23 | End: 2023-08-24

## 2023-08-23 RX ORDER — HYDROXYUREA 500 MG/1
500 CAPSULE ORAL DAILY
Refills: 0 | Status: DISCONTINUED | OUTPATIENT
Start: 2023-08-23 | End: 2023-08-23

## 2023-08-23 RX ORDER — CLOPIDOGREL BISULFATE 75 MG/1
75 TABLET, FILM COATED ORAL DAILY
Refills: 0 | Status: DISCONTINUED | OUTPATIENT
Start: 2023-08-24 | End: 2023-08-24

## 2023-08-23 RX ORDER — SODIUM CHLORIDE 9 MG/ML
250 INJECTION INTRAMUSCULAR; INTRAVENOUS; SUBCUTANEOUS ONCE
Refills: 0 | Status: COMPLETED | OUTPATIENT
Start: 2023-08-23 | End: 2023-08-23

## 2023-08-23 RX ORDER — ASPIRIN/CALCIUM CARB/MAGNESIUM 324 MG
1 TABLET ORAL
Refills: 0 | DISCHARGE

## 2023-08-23 RX ORDER — CLOPIDOGREL BISULFATE 75 MG/1
1 TABLET, FILM COATED ORAL
Qty: 30 | Refills: 11
Start: 2023-08-23 | End: 2024-08-16

## 2023-08-23 RX ORDER — HYDROXYUREA 500 MG/1
500 CAPSULE ORAL
Refills: 0 | Status: DISCONTINUED | OUTPATIENT
Start: 2023-08-23 | End: 2023-08-24

## 2023-08-23 RX ORDER — METOPROLOL TARTRATE 50 MG
25 TABLET ORAL DAILY
Refills: 0 | Status: DISCONTINUED | OUTPATIENT
Start: 2023-08-23 | End: 2023-08-24

## 2023-08-23 RX ORDER — ASPIRIN/CALCIUM CARB/MAGNESIUM 324 MG
81 TABLET ORAL ONCE
Refills: 0 | Status: DISCONTINUED | OUTPATIENT
Start: 2023-08-24 | End: 2023-08-24

## 2023-08-23 RX ORDER — LORATADINE 10 MG/1
10 TABLET ORAL AT BEDTIME
Refills: 0 | Status: DISCONTINUED | OUTPATIENT
Start: 2023-08-23 | End: 2023-08-24

## 2023-08-23 RX ORDER — SODIUM CHLORIDE 9 MG/ML
1000 INJECTION INTRAMUSCULAR; INTRAVENOUS; SUBCUTANEOUS
Refills: 0 | Status: DISCONTINUED | OUTPATIENT
Start: 2023-08-23 | End: 2023-08-24

## 2023-08-23 RX ADMIN — SODIUM CHLORIDE 250 MILLILITER(S): 9 INJECTION INTRAMUSCULAR; INTRAVENOUS; SUBCUTANEOUS at 14:37

## 2023-08-23 RX ADMIN — LORATADINE 10 MILLIGRAM(S): 10 TABLET ORAL at 21:35

## 2023-08-23 RX ADMIN — SODIUM CHLORIDE 75 MILLILITER(S): 9 INJECTION INTRAMUSCULAR; INTRAVENOUS; SUBCUTANEOUS at 17:19

## 2023-08-23 RX ADMIN — RIVAROXABAN 20 MILLIGRAM(S): KIT at 21:35

## 2023-08-23 NOTE — PACU DISCHARGE NOTE - COMMENTS
Report given to Cassie ricardo on [facility] , pt is alert and oriented x 3 with vital signs stable. monitor pattern RSR.  right radial bulky dressing intact, no bleeding or hematoma noted, fingers warm and mobile with good capillary refill.  Denies pain or discomfort.  Voided without difficulty, IV fluids continues without difficulty.  Pt transferred to [address] [place] [address] with life oscar attached.

## 2023-08-23 NOTE — ASU PREOP CHECKLIST - HEIGHT IN FEET
Patient called and is requesting a refill of the Otto    [id number] - [place], [place] - [address]  [phone]   5

## 2023-08-23 NOTE — PROGRESS NOTE ADULT - SUBJECTIVE AND OBJECTIVE BOX
Cath Lab Nurse Practitioner Note  HPI:  82yr old female PMH HTN, thrombocythemia, PAF ( Xarelto last dose 8/20/23) presented to cardiology with c/o " a feeling" intermittently in her chest. Pt describes this as breathing in menthol. Unable to reach target HR on her stress test, pt had cardiac PET revealing severe stenosis of LAD.  Pt referred for cardiac cath for further evaluation.,  (22 Aug 2023 14:29)    s/p LHC : NELSON to LAD  Pt denies chest pain/SOB/palpitations post cath.      Vital Signs Last 24 Hrs  T(C): 36.8 (23 Aug 2023 14:23), Max: 36.8 (23 Aug 2023 14:23)  T(F): 98.2 (23 Aug 2023 14:23), Max: 98.2 (23 Aug 2023 14:23)  HR: 58 (23 Aug 2023 14:23) (58 - 58)  BP: 188/80 (23 Aug 2023 14:23) (188/80 - 188/80)  BP(mean): --  RR: 18 (23 Aug 2023 14:23) (18 - 18)  SpO2: 100% (23 Aug 2023 14:23) (100% - 100%)    Parameters below as of 23 Aug 2023 14:23  Patient On (Oxygen Delivery Method): room air          MEDICATIONS  (STANDING):  hydroxyurea 500 milliGRAM(s) Oral daily  loratadine 10 milliGRAM(s) Oral at bedtime  metoprolol succinate ER 25 milliGRAM(s) Oral daily  rivaroxaban 20 milliGRAM(s) Oral daily      PHYSICAL EXAM  GENERAL: NAD, AAOx3  HEAD:  Atraumatic, Normocephalic  EYES: EOMI, PERRLA, conjunctiva and sclera clear  NECK: Supple, No JVD, No LAD  CHEST/LUNG: Clear to auscultation bilaterally; No wheeze  HEART: s1 s2 Regular rate and rhythm; No murmurs, rubs, or gallops  ABDOMEN: Soft, Nontender, Nondistended; Bowel sounds present X 4 quadrants   EXTREMITIES:  2+ Peripheral Pulses, No clubbing, cyanosis, or edema  SKIN: No rashes or lesions,  b/l LE not red, cool to touch,, no open skin no drainage  NEURO: nonfocal CN/motor/sensory/reflexes  Psych: normal affect and behavior, calm and cooperative   PROCEDURE SITE: band removed two hours post placement ,Site is without hematoma or bleeding. Sensation and SONNY intact. Distal pulses palpable 2+, capillary refill < 2 seconds. Patient denies pain, numbness, tingling, CP or SOB. Clean dry dressing applied     EKG POST PCI: 8/23/23    PROCEDURE RESULTS full report to genie     ASSESSMENT : HPI:  82yr old female PMH HTN, thrombocythemia, PAF ( Xarelto last dose 8/20/23) presented to cardiology with c/o " a feeling" intermittently in her chest. Pt describes this as breathing in menthol. Unable to reach target HR on her stress test, pt had cardiac PET revealing severe stenosis of LAD.  Pt referred for cardiac cath for further evaluation.,  (22 Aug 2023 14:29)    S/p LHC : NELSON to LAD    PLAN:  Admit for observation  -IV hydration NS @ 75mL/hr x 10 hours  VS, lab, diet and activity per PCI post orders  -Resume Xarelto 20mg tonight at HS  -Begin Plavix 75mg 8/24/23   -Continue all other medications  -F/U labs/ekg in am  -Plan discussed with patient and Dr. Toney  -Discussed therapeutic lifestyle changes to reduce risk factors such as following a cardiac diet, weight loss, maintaining a healthy weight, exercise, smoking cessation, medication compliance, and regular follow-up  with PCP/Cardioloigst  --Post cath instructions reviewed, patient verbalizes and understands instructions  Pt will follow up with Dr. Case          Cath Lab Nurse Practitioner Note  HPI:  82yr old female PMH HTN, thrombocythemia, PAF ( Xarelto last dose 8/20/23) presented to cardiology with c/o " a feeling" intermittently in her chest. Pt describes this as breathing in menthol. Unable to reach target HR on her stress test, pt had cardiac PET revealing severe stenosis of LAD.  Pt referred for cardiac cath for further evaluation.,  (22 Aug 2023 14:29)    s/p LHC : NELSON to LAD  Pt denies chest pain/SOB/palpitations post cath.      Vital Signs Last 24 Hrs  T(C): 36.8 (23 Aug 2023 14:23), Max: 36.8 (23 Aug 2023 14:23)  T(F): 98.2 (23 Aug 2023 14:23), Max: 98.2 (23 Aug 2023 14:23)  HR: 58 (23 Aug 2023 14:23) (58 - 58)  BP: 188/80 (23 Aug 2023 14:23) (188/80 - 188/80)  BP(mean): --  RR: 18 (23 Aug 2023 14:23) (18 - 18)  SpO2: 100% (23 Aug 2023 14:23) (100% - 100%)    Parameters below as of 23 Aug 2023 14:23  Patient On (Oxygen Delivery Method): room air          MEDICATIONS  (STANDING):  hydroxyurea 500 milliGRAM(s) Oral daily  loratadine 10 milliGRAM(s) Oral at bedtime  metoprolol succinate ER 25 milliGRAM(s) Oral daily  rivaroxaban 20 milliGRAM(s) Oral daily      PHYSICAL EXAM  GENERAL: NAD, AAOx3  HEAD:  Atraumatic, Normocephalic  EYES: EOMI, PERRLA, conjunctiva and sclera clear  NECK: Supple, No JVD, No LAD  CHEST/LUNG: Clear to auscultation bilaterally; No wheeze  HEART: s1 s2 Regular rate and rhythm; No murmurs, rubs, or gallops  ABDOMEN: Soft, Nontender, Nondistended; Bowel sounds present X 4 quadrants   EXTREMITIES:  2+ Peripheral Pulses, No clubbing, cyanosis, or edema  SKIN: No rashes or lesions,  b/l LE not red, cool to touch,, no open skin no drainage  NEURO: nonfocal CN/motor/sensory/reflexes  Psych: normal affect and behavior, calm and cooperative   PROCEDURE SITE: band removed two hours post placement ,Site is without hematoma or bleeding. Sensation and SONNY intact. Distal pulses palpable 2+, capillary refill < 2 seconds. Patient denies pain, numbness, tingling, CP or SOB. Clean dry dressing applied     EKG POST PCI: 8/23/23    PROCEDURE RESULTS full report to genie     ASSESSMENT : HPI:  82yr old female PMH HTN, thrombocythemia, PAF ( Xarelto last dose 8/20/23) presented to cardiology with c/o " a feeling" intermittently in her chest. Pt describes this as breathing in menthol. Unable to reach target HR on her stress test, pt had cardiac PET revealing severe stenosis of LAD.  Pt referred for cardiac cath for further evaluation.,  (22 Aug 2023 14:29)    S/p LHC : NELSON to LAD    PLAN:  Admit for observation  -IV hydration NS @ 75mL/hr x 10 hours  VS, lab, diet and activity per PCI post orders  -Resume Xarelto 20mg tonight at HS  -Begin Plavix 75mg 8/24/23   -Aspirin 81mg just 8/23/23 and 8/24/23  -Continue all other medications  -F/U labs/ekg in am  -Plan discussed with patient and Dr. Toney  -Discussed therapeutic lifestyle changes to reduce risk factors such as following a cardiac diet, weight loss, maintaining a healthy weight, exercise, smoking cessation, medication compliance, and regular follow-up  with PCP/Cardioloigst  --Post cath instructions reviewed, patient verbalizes and understands instructions  Pt will follow up with Dr. Case

## 2023-08-24 ENCOUNTER — TRANSCRIPTION ENCOUNTER (OUTPATIENT)
Age: 83
End: 2023-08-24

## 2023-08-24 VITALS
HEART RATE: 63 BPM | SYSTOLIC BLOOD PRESSURE: 139 MMHG | DIASTOLIC BLOOD PRESSURE: 80 MMHG | TEMPERATURE: 98 F | RESPIRATION RATE: 18 BRPM | OXYGEN SATURATION: 98 %

## 2023-08-24 LAB
ANION GAP SERPL CALC-SCNC: 6 MMOL/L — SIGNIFICANT CHANGE UP (ref 5–17)
BASOPHILS # BLD AUTO: 0.05 K/UL — SIGNIFICANT CHANGE UP (ref 0–0.2)
BASOPHILS NFR BLD AUTO: 0.6 % — SIGNIFICANT CHANGE UP (ref 0–2)
BUN SERPL-MCNC: 14 MG/DL — SIGNIFICANT CHANGE UP (ref 7–23)
CALCIUM SERPL-MCNC: 8.9 MG/DL — SIGNIFICANT CHANGE UP (ref 8.5–10.1)
CHLORIDE SERPL-SCNC: 113 MMOL/L — HIGH (ref 96–108)
CO2 SERPL-SCNC: 24 MMOL/L — SIGNIFICANT CHANGE UP (ref 22–31)
CREAT SERPL-MCNC: 0.82 MG/DL — SIGNIFICANT CHANGE UP (ref 0.5–1.3)
EGFR: 71 ML/MIN/1.73M2 — SIGNIFICANT CHANGE UP
EOSINOPHIL # BLD AUTO: 0.15 K/UL — SIGNIFICANT CHANGE UP (ref 0–0.5)
EOSINOPHIL NFR BLD AUTO: 1.9 % — SIGNIFICANT CHANGE UP (ref 0–6)
GLUCOSE SERPL-MCNC: 154 MG/DL — HIGH (ref 70–99)
HCT VFR BLD CALC: 38.8 % — SIGNIFICANT CHANGE UP (ref 34.5–45)
HGB BLD-MCNC: 12.7 G/DL — SIGNIFICANT CHANGE UP (ref 11.5–15.5)
IMM GRANULOCYTES NFR BLD AUTO: 0.4 % — SIGNIFICANT CHANGE UP (ref 0–0.9)
LYMPHOCYTES # BLD AUTO: 1.01 K/UL — SIGNIFICANT CHANGE UP (ref 1–3.3)
LYMPHOCYTES # BLD AUTO: 12.6 % — LOW (ref 13–44)
MCHC RBC-ENTMCNC: 30.2 PG — SIGNIFICANT CHANGE UP (ref 27–34)
MCHC RBC-ENTMCNC: 32.7 GM/DL — SIGNIFICANT CHANGE UP (ref 32–36)
MCV RBC AUTO: 92.4 FL — SIGNIFICANT CHANGE UP (ref 80–100)
MONOCYTES # BLD AUTO: 0.46 K/UL — SIGNIFICANT CHANGE UP (ref 0–0.9)
MONOCYTES NFR BLD AUTO: 5.7 % — SIGNIFICANT CHANGE UP (ref 2–14)
NEUTROPHILS # BLD AUTO: 6.33 K/UL — SIGNIFICANT CHANGE UP (ref 1.8–7.4)
NEUTROPHILS NFR BLD AUTO: 78.8 % — HIGH (ref 43–77)
PLATELET # BLD AUTO: 490 K/UL — HIGH (ref 150–400)
POTASSIUM SERPL-MCNC: 3.9 MMOL/L — SIGNIFICANT CHANGE UP (ref 3.5–5.3)
POTASSIUM SERPL-SCNC: 3.9 MMOL/L — SIGNIFICANT CHANGE UP (ref 3.5–5.3)
RBC # BLD: 4.2 M/UL — SIGNIFICANT CHANGE UP (ref 3.8–5.2)
RBC # FLD: 15.5 % — HIGH (ref 10.3–14.5)
SODIUM SERPL-SCNC: 143 MMOL/L — SIGNIFICANT CHANGE UP (ref 135–145)
WBC # BLD: 8.03 K/UL — SIGNIFICANT CHANGE UP (ref 3.8–10.5)
WBC # FLD AUTO: 8.03 K/UL — SIGNIFICANT CHANGE UP (ref 3.8–10.5)

## 2023-08-24 PROCEDURE — 93010 ELECTROCARDIOGRAM REPORT: CPT

## 2023-08-24 RX ORDER — METOPROLOL TARTRATE 50 MG
1 TABLET ORAL
Refills: 0 | DISCHARGE

## 2023-08-24 RX ORDER — METOPROLOL TARTRATE 50 MG
1 TABLET ORAL
Qty: 0 | Refills: 0 | DISCHARGE
Start: 2023-08-24

## 2023-08-24 RX ADMIN — Medication 25 MILLIGRAM(S): at 10:06

## 2023-08-24 RX ADMIN — CLOPIDOGREL BISULFATE 75 MILLIGRAM(S): 75 TABLET, FILM COATED ORAL at 10:06

## 2023-08-24 NOTE — DISCHARGE NOTE PROVIDER - NSDCCPCAREPLAN_GEN_ALL_CORE_FT
PRINCIPAL DISCHARGE DIAGNOSIS  Diagnosis: CAD (coronary artery disease)  Assessment and Plan of Treatment: Plavix, statin

## 2023-08-24 NOTE — DISCHARGE NOTE PROVIDER - HOSPITAL COURSE
82yr old female PMH HTN, thrombocythemia, PAF ( Xarelto last dose 8/20/23) presented to cardiology with c/o " a feeling" intermittently in her chest. Pt describes this as breathing in menthol. Unable to reach target HR on her stress test, pt had cardiac PET revealing severe stenosis of LAD.  Pt referred for cardiac cath for further evaluation.,  (22 Aug 2023 14:29)  s/p LHC : NELSON to LAD. Started on Plavix, Xarelto was resumed. ASA to be discontinued after today's dose. Pt denies chest pain/SOB/palpitations      82yr old female PMH HTN, thrombocythemia, PAF ( Xarelto last dose 8/20/23) presented to cardiology with c/o " a feeling" intermittently in her chest. Pt describes this as breathing in menthol. Unable to reach target HR on her stress test, pt had cardiac PET revealing severe stenosis of LAD.  Pt referred for cardiac cath for further evaluation.,  (22 Aug 2023 14:29)  s/p LHC : NELSON to LAD. Started on Plavix, Xarelto was resumed. ASA to be discontinued after today's dose. Pt is reluctant to start statin; was explained rationale for being on it post PCI. Pt would prefer not to be started on statin at this time and discuss statin with Dr Gutierres as an outpatient  Pt denies chest pain/SOB/palpitations

## 2023-08-24 NOTE — DISCHARGE NOTE PROVIDER - NSDCCPTREATMENT_GEN_ALL_CORE_FT
PRINCIPAL PROCEDURE  Procedure: Percutaneous coronary intervention, with stent insertion  Findings and Treatment: LAD stent

## 2023-08-24 NOTE — PROGRESS NOTE ADULT - SUBJECTIVE AND OBJECTIVE BOX
Nurse Practitioner Progress note: 82yr old female PMH HTN, thrombocythemia, PAF ( Xarelto last dose 8/20/23) presented to cardiology with c/o " a feeling" intermittently in her chest. Pt describes this as breathing in menthol. Unable to reach target HR on her stress test, pt had cardiac PET revealing severe stenosis of LAD.  Pt referred for cardiac cath for further evaluation.,  (22 Aug 2023 14:29)  s/p NELSON to LAD on 8/23/23. Started on Plavix, Xarelto was resumed. ASA to be discontinued after today's dose. Pt is reluctant to start statin; was explained rationale for being on it post PCI. Pt would prefer not to be started on statin at this time and discuss statin with Dr Gutierres as an outpatient  PHYSICAL EXAM:  Constitutional: NAD  Neuro: Alert and oriented x 3 Speech clear No focal deficits  Respiratory: CTAB  Cardiovascular: S1 and S2, RRR,   Gastrointestinal: BS+, soft, NT/ND  Extremities: No clubbing cyanosis or edema No varicosities  Vascular: 2+ peripheral pulses  Psychiatric: Normal mood, normal affect  Musculoskeletal: 5/5 strength b/l upper and lower extremities  Right radial dsg removed. Procedure site CDI, no bleeding, no hematoma, able to move all digits with capillary refill < 3 seconds, fingers warm    T(C): 36.8 (08-24-23 @ 09:26), Max: 36.8 (08-23-23 @ 14:23)  HR: 63 (08-24-23 @ 09:26) (54 - 63)  BP: 139/80 (08-24-23 @ 09:26) (136/76 - 188/80)  RR: 18 (08-24-23 @ 09:26) (16 - 18)  SpO2: 98% (08-24-23 @ 09:26) (97% - 100%)    Tele: NSR  EKG: NSR    CBC Full  -  ( 24 Aug 2023 08:14 )  WBC Count : 8.03 K/uL  RBC Count : 4.20 M/uL  Hemoglobin : 12.7 g/dL  Hematocrit : 38.8 %  Platelet Count - Automated : 490 K/uL  Mean Cell Volume : 92.4 fl  Mean Cell Hemoglobin : 30.2 pg  Mean Cell Hemoglobin Concentration : 32.7 gm/dL  Auto Neutrophil # : 6.33 K/uL  Auto Lymphocyte # : 1.01 K/uL  Auto Monocyte # : 0.46 K/uL  Auto Eosinophil # : 0.15 K/uL  Auto Basophil # : 0.05 K/uL  Auto Neutrophil % : 78.8 %  Auto Lymphocyte % : 12.6 %  Auto Monocyte % : 5.7 %  Auto Eosinophil % : 1.9 %  Auto Basophil % : 0.6 %    08-24    143  |  113<H>  |  14  ----------------------------<  154<H>  3.9   |  24  |  0.82    Ca    8.9      24 Aug 2023 08:14    MEDICATIONS  (STANDING):  aspirin enteric coated 81 milliGRAM(s) Oral once  clopidogrel Tablet 75 milliGRAM(s) Oral daily  hydroxyurea 500 milliGRAM(s) Oral <User Schedule>  loratadine 10 milliGRAM(s) Oral at bedtime  metoprolol succinate ER 25 milliGRAM(s) Oral daily  rivaroxaban 20 milliGRAM(s) Oral daily  sodium chloride 0.9%. 1000 milliLiter(s) (75 mL/Hr) IV Continuous <Continuous>    MEDICATIONS  (PRN):        HPI:  82yr old female PMH HTN, thrombocythemia, PAF ( Xarelto last dose 8/20/23) presented to cardiology with c/o " a feeling" intermittently in her chest. Pt describes this as breathing in menthol. Unable to reach target HR on her stress test, pt had cardiac PET revealing severe stenosis of LAD.  Pt referred for cardiac cath for further evaluation.,  (22 Aug 2023 14:29)  s/p NELSON to LAD on 8/23/23. Started on Plavix, Xarelto was resumed. ASA to be discontinued after today's dose. Pt is reluctant to start statin; was explained rationale for being on it post PCI. Pt would prefer not to be started on statin at this time and discuss statin with Dr Gutierres as an outpatient    ASSESSMENT/PLAN:    Coronary artery disease  -- No Aspirin while on Plavix and Xarelto  -Plavix 75mg PO daily-Rx  --Plan of care discussed with patient, family and MD  -Stable for discharge  -Follow-up with attending MD   within 1 week  -Discussed therapeutic lifestyle changes to reduce risk factors such as following a cardiac diet, weight loss, maintaining a healthy weight, exercise, smoking cessation, medication compliance, and regular follow-up  with MD Nurse Practitioner Progress note: 82yr old female PMH HTN, thrombocythemia, PAF ( Xarelto last dose 8/20/23) presented to cardiology with c/o " a feeling" intermittently in her chest. Pt describes this as breathing in menthol. Unable to reach target HR on her stress test, pt had cardiac PET revealing severe stenosis of LAD.  Pt referred for cardiac cath for further evaluation.,  (22 Aug 2023 14:29)  s/p NELSON to LAD on 8/23/23. Started on Plavix, Xarelto was resumed. ASA to be discontinued after today's dose. Pt is reluctant to start statin; was explained rationale for being on it post PCI. Pt would prefer not to be started on statin at this time and discuss statin with Dr Gutierres as an outpatient  PHYSICAL EXAM:  Constitutional: NAD  Neuro: Alert and oriented x 3 Speech clear No focal deficits  Respiratory: CTAB  Cardiovascular: S1 and S2, RRR,   Gastrointestinal: BS+, soft, NT/ND  Extremities: No clubbing cyanosis or edema No varicosities  Vascular: 2+ peripheral pulses  Psychiatric: Normal mood, normal affect  Musculoskeletal: 5/5 strength b/l upper and lower extremities  Right radial dsg removed. Procedure site CDI, no bleeding, no hematoma, able to move all digits with capillary refill < 3 seconds, fingers warm    T(C): 36.8 (08-24-23 @ 09:26), Max: 36.8 (08-23-23 @ 14:23)  HR: 63 (08-24-23 @ 09:26) (54 - 63)  BP: 139/80 (08-24-23 @ 09:26) (136/76 - 188/80)  RR: 18 (08-24-23 @ 09:26) (16 - 18)  SpO2: 98% (08-24-23 @ 09:26) (97% - 100%)    Tele: NSR  EKG: NSR    CBC Full  -  ( 24 Aug 2023 08:14 )  WBC Count : 8.03 K/uL  RBC Count : 4.20 M/uL  Hemoglobin : 12.7 g/dL  Hematocrit : 38.8 %  Platelet Count - Automated : 490 K/uL  Mean Cell Volume : 92.4 fl  Mean Cell Hemoglobin : 30.2 pg  Mean Cell Hemoglobin Concentration : 32.7 gm/dL  Auto Neutrophil # : 6.33 K/uL  Auto Lymphocyte # : 1.01 K/uL  Auto Monocyte # : 0.46 K/uL  Auto Eosinophil # : 0.15 K/uL  Auto Basophil # : 0.05 K/uL  Auto Neutrophil % : 78.8 %  Auto Lymphocyte % : 12.6 %  Auto Monocyte % : 5.7 %  Auto Eosinophil % : 1.9 %  Auto Basophil % : 0.6 %    08-24    143  |  113<H>  |  14  ----------------------------<  154<H>  3.9   |  24  |  0.82    Ca    8.9      24 Aug 2023 08:14    MEDICATIONS  (STANDING):  aspirin enteric coated 81 milliGRAM(s) Oral once  clopidogrel Tablet 75 milliGRAM(s) Oral daily  hydroxyurea 500 milliGRAM(s) Oral <User Schedule>  loratadine 10 milliGRAM(s) Oral at bedtime  metoprolol succinate ER 25 milliGRAM(s) Oral daily  rivaroxaban 20 milliGRAM(s) Oral daily  sodium chloride 0.9%. 1000 milliLiter(s) (75 mL/Hr) IV Continuous <Continuous>    MEDICATIONS  (PRN):        HPI:  82yr old female PMH HTN, thrombocythemia, PAF ( Xarelto last dose 8/20/23) presented to cardiology with c/o " a feeling" intermittently in her chest. Pt describes this as breathing in menthol. Unable to reach target HR on her stress test, pt had cardiac PET revealing severe stenosis of LAD.  Pt referred for cardiac cath for further evaluation.,  (22 Aug 2023 14:29)  s/p NELSON to LAD on 8/23/23. Started on Plavix, Xarelto was resumed. ASA to be discontinued after today's dose. Pt is reluctant to start statin; was explained rationale for being on it post PCI. Pt would prefer not to be started on statin at this time and discuss statin with Dr Gutierres as an outpatient    ASSESSMENT/PLAN:    Coronary artery disease  -- No Aspirin while on Plavix and Xarelto  -Plavix 75mg PO daily-Rx  --Plan of care discussed with patient, family and MD  -Stable for discharge  -Follow-up with attending MD   within 1 week  -Discussed therapeutic lifestyle changes to reduce risk factors such as following a cardiac diet, weight loss, maintaining a healthy weight, exercise, smoking cessation, medication compliance, and regular follow-up  with MD  -Cardiac rehab recommended to pt who refused

## 2023-08-24 NOTE — DISCHARGE NOTE PROVIDER - NSDCMRMEDTOKEN_GEN_ALL_CORE_FT
clopidogrel 75 mg oral tablet: 1 tab(s) orally once a day  hydroxyurea 500 mg oral capsule: 500 milligram(s) orally once a day take on M-W-F  isosorbide dinitrate 30 mg oral tablet: 1 tab(s) orally once a day  loratadine 10 mg oral tablet: 1 tab(s) orally once a day (at bedtime)  metoprolol succinate 25 mg oral tablet, extended release: 1 tab(s) orally once a day  Xarelto 20 mg oral tablet: 1 tab(s) orally once a day (at bedtime)

## 2023-08-24 NOTE — DISCHARGE NOTE PROVIDER - CARE PROVIDER_API CALL
Toribio Case)  Cardiology  [address] [place] [address]  Phone: [phone]  Fax: [phone]  Follow Up Time: 1 week

## 2023-08-24 NOTE — DISCHARGE NOTE NURSING/CASE MANAGEMENT/SOCIAL WORK - PATIENT PORTAL LINK FT
You can access the FollowMyHealth Patient Portal offered by [facility] by registering at the following website: [email or web address]. By joining [employer]’s FollowMyHealth portal, you will also be able to view your health information using other applications (apps) compatible with our system.

## 2023-08-24 NOTE — DISCHARGE NOTE NURSING/CASE MANAGEMENT/SOCIAL WORK - NSDCPEXARELTOREACT_GEN_ALL_CORE
Last doppler done 2014 - showed only plaque  No symptoms  Continue statin, BP control  No indication for ASA      Rivaroxaban/Xarelto increases your risk for bleeding. Notify your doctor if you experience any of the following side effects: unusual bleeding or bruising, vomiting blood or coffee ground-like material, red or black stool, itching or hives, chest tightness, trouble breathing, swelling in your face or hands, swelling in your mouth or throat, change in how much or how often you urinate, red or brown urine, heavy menstrual or vaginal bleeding, or blistering or peeling skin. When Rivaroxaban/Xarelto is taken with other medicines, they can affect how it works. Taking other medications such as aspirin, antibiotics, antifungals, blood thinners, nonsteroidal anti-inflammatories, and medications that treat depression can increase your risk of bleeding. It is very important to tell your health care provider about all of the other medicines, including over-the-counter medications, herbs, and vitamins you are taking.  DO NOT start, stop, or change the dosage of any medicine, including over-the-counter medicines, vitamins, and herbal products without your doctor’s approval.  Any products containing aspirin or are nonsteroidal anti-inflammatories lessen the blood’s ability to form clots and adds to the effect of Rivaroxaban/Xarelto. Never take aspirin or medicines that contain aspirin without speaking to your doctor.

## 2023-08-24 NOTE — DISCHARGE NOTE NURSING/CASE MANAGEMENT/SOCIAL WORK - NSDCPEFALRISK_GEN_ALL_CORE
For information on Fall & Injury Prevention, visit: [email or web address] OR  [email or web address] OR  https://www.cdc.gov/steadi/patient.html

## 2023-08-24 NOTE — DISCHARGE NOTE PROVIDER - NSDCFUSCHEDAPPT_GEN_ALL_CORE_FT
Toribio Case Physician [facility]  CARDIOLOGY [address]  Scheduled Appointment: 09/08/2023

## 2023-08-30 ENCOUNTER — NON-APPOINTMENT (OUTPATIENT)
Age: 83
End: 2023-08-30

## 2023-08-30 DIAGNOSIS — I10 ESSENTIAL (PRIMARY) HYPERTENSION: ICD-10-CM

## 2023-08-30 DIAGNOSIS — I25.118 ATHEROSCLEROTIC HEART DISEASE OF NATIVE CORONARY ARTERY WITH OTHER FORMS OF ANGINA PECTORIS: ICD-10-CM

## 2023-08-30 DIAGNOSIS — E78.5 HYPERLIPIDEMIA, UNSPECIFIED: ICD-10-CM

## 2023-08-30 DIAGNOSIS — R94.39 ABNORMAL RESULT OF OTHER CARDIOVASCULAR FUNCTION STUDY: ICD-10-CM

## 2023-09-01 ENCOUNTER — INPATIENT (INPATIENT)
Facility: HOSPITAL | Age: 83
LOS: 13 days | Discharge: HOME HEALTH | DRG: 280 | End: 2023-09-15
Attending: SURGERY | Admitting: SURGERY
Payer: MEDICARE

## 2023-09-01 ENCOUNTER — NON-APPOINTMENT (OUTPATIENT)
Age: 83
End: 2023-09-01

## 2023-09-01 ENCOUNTER — APPOINTMENT (OUTPATIENT)
Dept: CARDIOLOGY | Facility: CLINIC | Age: 83
End: 2023-09-01
Payer: MEDICARE

## 2023-09-01 VITALS
SYSTOLIC BLOOD PRESSURE: 96 MMHG | OXYGEN SATURATION: 99 % | HEIGHT: 68 IN | DIASTOLIC BLOOD PRESSURE: 58 MMHG | BODY MASS INDEX: 22.73 KG/M2 | WEIGHT: 150 LBS

## 2023-09-01 VITALS
HEIGHT: 68 IN | SYSTOLIC BLOOD PRESSURE: 131 MMHG | DIASTOLIC BLOOD PRESSURE: 75 MMHG | OXYGEN SATURATION: 100 % | TEMPERATURE: 98 F | RESPIRATION RATE: 26 BRPM | HEART RATE: 133 BPM

## 2023-09-01 DIAGNOSIS — Z98.890 OTHER SPECIFIED POSTPROCEDURAL STATES: Chronic | ICD-10-CM

## 2023-09-01 DIAGNOSIS — Z90.89 ACQUIRED ABSENCE OF OTHER ORGANS: Chronic | ICD-10-CM

## 2023-09-01 DIAGNOSIS — Z41.9 ENCOUNTER FOR PROCEDURE FOR PURPOSES OTHER THAN REMEDYING HEALTH STATE, UNSPECIFIED: Chronic | ICD-10-CM

## 2023-09-01 LAB
ALBUMIN SERPL ELPH-MCNC: 3 G/DL — LOW (ref 3.3–5)
ALP SERPL-CCNC: 59 U/L — SIGNIFICANT CHANGE UP (ref 40–120)
ALT FLD-CCNC: 32 U/L — SIGNIFICANT CHANGE UP (ref 12–78)
ANION GAP SERPL CALC-SCNC: 5 MMOL/L — SIGNIFICANT CHANGE UP (ref 5–17)
APTT BLD: 36 SEC — HIGH (ref 24.5–35.6)
AST SERPL-CCNC: 46 U/L — HIGH (ref 15–37)
BASOPHILS # BLD AUTO: 0.03 K/UL — SIGNIFICANT CHANGE UP (ref 0–0.2)
BASOPHILS NFR BLD AUTO: 0.3 % — SIGNIFICANT CHANGE UP (ref 0–2)
BILIRUB SERPL-MCNC: 0.4 MG/DL — SIGNIFICANT CHANGE UP (ref 0.2–1.2)
BUN SERPL-MCNC: 24 MG/DL — HIGH (ref 7–23)
CALCIUM SERPL-MCNC: 8.5 MG/DL — SIGNIFICANT CHANGE UP (ref 8.5–10.1)
CHLORIDE SERPL-SCNC: 109 MMOL/L — HIGH (ref 96–108)
CO2 SERPL-SCNC: 23 MMOL/L — SIGNIFICANT CHANGE UP (ref 22–31)
CREAT SERPL-MCNC: 1.23 MG/DL — SIGNIFICANT CHANGE UP (ref 0.5–1.3)
EGFR: 44 ML/MIN/1.73M2 — LOW
EOSINOPHIL # BLD AUTO: 0.07 K/UL — SIGNIFICANT CHANGE UP (ref 0–0.5)
EOSINOPHIL NFR BLD AUTO: 0.8 % — SIGNIFICANT CHANGE UP (ref 0–6)
GLUCOSE SERPL-MCNC: 135 MG/DL — HIGH (ref 70–99)
HCT VFR BLD CALC: 39.7 % — SIGNIFICANT CHANGE UP (ref 34.5–45)
HGB BLD-MCNC: 13.2 G/DL — SIGNIFICANT CHANGE UP (ref 11.5–15.5)
IMM GRANULOCYTES NFR BLD AUTO: 0.1 % — SIGNIFICANT CHANGE UP (ref 0–0.9)
INR BLD: 2.11 RATIO — HIGH (ref 0.85–1.18)
LACTATE SERPL-SCNC: 1.7 MMOL/L — SIGNIFICANT CHANGE UP (ref 0.7–2)
LYMPHOCYTES # BLD AUTO: 0.32 K/UL — LOW (ref 1–3.3)
LYMPHOCYTES # BLD AUTO: 3.6 % — LOW (ref 13–44)
MANUAL SMEAR VERIFICATION: SIGNIFICANT CHANGE UP
MCHC RBC-ENTMCNC: 30.6 PG — SIGNIFICANT CHANGE UP (ref 27–34)
MCHC RBC-ENTMCNC: 33.2 GM/DL — SIGNIFICANT CHANGE UP (ref 32–36)
MCV RBC AUTO: 92.1 FL — SIGNIFICANT CHANGE UP (ref 80–100)
MONOCYTES # BLD AUTO: 0.34 K/UL — SIGNIFICANT CHANGE UP (ref 0–0.9)
MONOCYTES NFR BLD AUTO: 3.8 % — SIGNIFICANT CHANGE UP (ref 2–14)
NEUTROPHILS # BLD AUTO: 8.12 K/UL — HIGH (ref 1.8–7.4)
NEUTROPHILS NFR BLD AUTO: 91.4 % — HIGH (ref 43–77)
PLAT MORPH BLD: NORMAL — SIGNIFICANT CHANGE UP
PLATELET # BLD AUTO: 358 K/UL — SIGNIFICANT CHANGE UP (ref 150–400)
POTASSIUM SERPL-MCNC: 4.9 MMOL/L — SIGNIFICANT CHANGE UP (ref 3.5–5.3)
POTASSIUM SERPL-SCNC: 4.9 MMOL/L — SIGNIFICANT CHANGE UP (ref 3.5–5.3)
PROT SERPL-MCNC: 6.8 GM/DL — SIGNIFICANT CHANGE UP (ref 6–8.3)
PROTHROM AB SERPL-ACNC: 23.3 SEC — HIGH (ref 9.5–13)
RBC # BLD: 4.31 M/UL — SIGNIFICANT CHANGE UP (ref 3.8–5.2)
RBC # FLD: 15.7 % — HIGH (ref 10.3–14.5)
RBC BLD AUTO: NORMAL — SIGNIFICANT CHANGE UP
SODIUM SERPL-SCNC: 137 MMOL/L — SIGNIFICANT CHANGE UP (ref 135–145)
TROPONIN I, HIGH SENSITIVITY RESULT: 1659.38 NG/L — HIGH
WBC # BLD: 8.89 K/UL — SIGNIFICANT CHANGE UP (ref 3.8–10.5)
WBC # FLD AUTO: 8.89 K/UL — SIGNIFICANT CHANGE UP (ref 3.8–10.5)

## 2023-09-01 PROCEDURE — 93000 ELECTROCARDIOGRAM COMPLETE: CPT

## 2023-09-01 PROCEDURE — 71045 X-RAY EXAM CHEST 1 VIEW: CPT | Mod: 26

## 2023-09-01 PROCEDURE — 99285 EMERGENCY DEPT VISIT HI MDM: CPT

## 2023-09-01 PROCEDURE — 99215 OFFICE O/P EST HI 40 MIN: CPT

## 2023-09-01 RX ORDER — HYDROXYUREA 500 MG/1
500 CAPSULE ORAL
Qty: 30 | Refills: 4 | Status: DISCONTINUED | COMMUNITY
Start: 2023-08-04 | End: 2023-09-01

## 2023-09-01 RX ORDER — ASPIRIN 81 MG/1
81 TABLET ORAL
Refills: 0 | Status: DISCONTINUED | COMMUNITY
End: 2023-09-01

## 2023-09-01 RX ORDER — ISOSORBIDE MONONITRATE 30 MG/1
30 TABLET, EXTENDED RELEASE ORAL DAILY
Qty: 30 | Refills: 3 | Status: DISCONTINUED | COMMUNITY
Start: 2023-08-16 | End: 2023-09-01

## 2023-09-01 RX ORDER — OMEGA-3S/DHA/EPA/FISH OIL 300-1000MG
CAPSULE ORAL
Refills: 0 | Status: DISCONTINUED | COMMUNITY
End: 2023-09-01

## 2023-09-01 RX ORDER — SODIUM CHLORIDE 9 MG/ML
1000 INJECTION INTRAMUSCULAR; INTRAVENOUS; SUBCUTANEOUS ONCE
Refills: 0 | Status: COMPLETED | OUTPATIENT
Start: 2023-09-01 | End: 2023-09-01

## 2023-09-01 RX ORDER — ASPIRIN/CALCIUM CARB/MAGNESIUM 324 MG
324 TABLET ORAL ONCE
Refills: 0 | Status: COMPLETED | OUTPATIENT
Start: 2023-09-01 | End: 2023-09-01

## 2023-09-01 RX ORDER — DORZOLAMIDE HYDROCHLORIDE TIMOLOL MALEATE 20; 5 MG/ML; MG/ML
22.3-6.8 SOLUTION/ DROPS OPHTHALMIC TWICE DAILY
Refills: 0 | Status: ACTIVE | COMMUNITY
Start: 2017-11-18

## 2023-09-01 RX ADMIN — Medication 324 MILLIGRAM(S): at 23:08

## 2023-09-01 RX ADMIN — SODIUM CHLORIDE 1000 MILLILITER(S): 9 INJECTION INTRAMUSCULAR; INTRAVENOUS; SUBCUTANEOUS at 22:45

## 2023-09-01 NOTE — HISTORY OF PRESENT ILLNESS
[FreeTextEntry1] : rivas buitrago 1940  83 y/o  *CAD-s/p PCI LAD Aug-23-'23 (see below) *[id number]-[id number]-[id number]=2 (>75, CAD) *polycythemia vera  here for followup. Since last visit had cath after coronary CTA abnormal. Dr. Toney contacted me w/ results. had prox LAD stent. Since cath, over past 3 days reports fatigue, diarrhea, chills/shakes, profuse sweats.  No symptoms of afib (indigestion like symptoms) No severe symptoms of angina (shortness of breath more so than chest pressure).   she is unclear if they sometimes recur overall reduciton in symptoms    (seems to have some difficulty articulating symptoms).  ======================================= *ECGs: NSR no ischemic changes.  *Cath Aug-23-'23:  "conclusions: 1.severe prox LAD disease w/ IVUS showing 360 concentric ring of Ca. Mild diffuse athero of other vessels.  2.successful NELSON to pLAD after cutting balloon, shockwave lithotripsy. IVUS post revealing excellent stent expansion. THOMAS 3 flow no chest pain at end of case." "Recc: 1.maintain on DAPT w/ eventual transition to DOAC/P2Y12 inhibitor. 2.aggressive med mx for CAD. 3. close FU w/ cardio w/n 1 week" "Dx findings: LM mild athero LAD prox 80% stenosis LCx 30% stenosis,  RCA mild athero, LVEDP 21" EF 55%.  *Echo Jun '23: EF 60-65% mild LAE, mild MR severe TR mod pul HTN *EST Jul '23 at 1p artifact at peak exercise in leads I, II, aVF no chest pain during treadmill. no clear ischemia, in recovery PVCs noted for 1-2 tracing & pt reported symptoms. Did not reach target HR 84% b/c felt dyspneic.  *[place]: 7/26-7/29/'23 One episode of afib on 4th day duration  about 2 hrs see report for details. otherwise NSR.  *Cardiac PET Sep '19: normal perfusion EF normal no TID =======================================

## 2023-09-01 NOTE — ED PROVIDER NOTE - CLINICAL SUMMARY MEDICAL DECISION MAKING FREE TEXT BOX
83 y/o F with a PMhx of left shoulder pain, stress incontinence, eczema, low back pain, arthritis, glaucoma, chronic rhinitis presents to the ED for fever, chills, weakness, diarrhea. Here, pt is in rapid afib. Will w/u for infectious cause, treat with fluids to start, and consider rate control. pt already received 1L of fluids pta. 83 y/o F with a PMhx of left shoulder pain, stress incontinence, eczema, low back pain, arthritis, glaucoma, chronic rhinitis presents to the ED for fever, chills, weakness, diarrhea. Here, pt is in rapid afib. Will w/u for infectious cause, treat with fluids to start, and consider rate control. pt already received 1L of fluids pta.    All labs imaging reviewed by myself.  Patient with elevated troponin to 1600.  Unclear if this is secondary to recent cath or due to patient's palpitations.  While in the ED patient spontaneously converted to normal sinus rhythm she has no chest pain repeat EKG ordered cardiology paged case discussed with hospitalist accepts do not move until Case discussed with  cardiologist 83 y/o F with a PMhx of left shoulder pain, stress incontinence, eczema, low back pain, arthritis, glaucoma, chronic rhinitis presents to the ED for fever, chills, weakness, diarrhea. Here, pt is in rapid afib. Will w/u for infectious cause, treat with fluids to start, and consider rate control. pt already received 1L of fluids pta.    All labs imaging reviewed by myself.  Patient with elevated troponin to 1600.  Unclear if this is secondary to recent cath or due to patient's palpitations.  While in the ED patient spontaneously converted to normal sinus rhythm she has no chest pain repeat EKG ordered cardiology paged case discussed with hospitalist accepts do not move until Case discussed with  cardiologist    [id number] spoke with on call cards Dr. Palla rec for trend trop and will see patient

## 2023-09-01 NOTE — ED PROVIDER NOTE - NS_ATTENDINGSCRIBE_ED_ALL_ED
Hospital Discharge Follow-Up      Date/Time:  3/6/2020   4:31 PM    Patient was admitted to [facility] on 20 and discharged on 20 for Left Facial droop, TIA. The physician discharge summary was available at the time of outreach. Care Transition Nurse (CTN) contacted the patient by telephone for follow up. Verified name and  with patient as identifiers. Provided introduction to self, and explanation of the CTN role. Patient states that she is still having some abdominal cramping but no more nausea and diarrhea. Patient denied CP, SOB, difficulty breathing, headaches, fever, chills, nausea, vomiting, blood in stool or urine and  weakness. Patient states that she can eat and drink some now. Patient states that her home health nurse saw her today. Patient states that she didn't get any medications from the hospital.   Noted two prescriptions that are prescribed. New Prescriptions as per Den Bob MD of [facility]      DICYCLOMINE (BENTYL) 20 MG PO TABS Take 1 Tab by Mouth 4 Times Daily As Needed (abdominal pain). LOPERAMIDE (IMODIUM A-D) 2 MG PO CAPS Take 1 Cap by Mouth Every 6 Hours As Needed. Strongly advised Pt. To call her Pharmacy for above medications. Patient states that she will do so. Strongly advised Pt.to follow up with GI doctor. Patient states okay. Patient reminded that there is a physician on call 24 hours a day / 7 days a week (M-F 5pm to 8am and from Friday 5pm until Monday 8a for the weekend) should the patient have questions or concerns. Patient reminded to call 911 if situation is emergent ( such as chest pain, shortness of breath, unstoppable bleeding, feeling of passing out,  worsening of symptoms)or patient feels the situation is emergent. Pt verbalizes understanding.     [facility] follow up appointment(s):   Future Appointments   Date Time Provider Cory Daniels   2020  9:00 AM Ayesha Morris DPT [facility] OpenPM 4/7/2020  3:30 PM Mateus Yanez MD [id number] [facility] 4625      Patient thanked us for follow up call. Patient states that she will call her pharmacy. Patient kept the conversation short and ended the call. I personally performed the service described in the documentation recorded by the scribe in my presence, and it accurately and completely records my words and actions.

## 2023-09-01 NOTE — ED ADULT TRIAGE NOTE - BP NONINVASIVE SYSTOLIC (MM HG)
 H/O cardiovascular stress test 12/28/2017    Larglely normal myocardial perfusion imaging with soft tissue artifact but w/o evidence of significant myocardial ischemia or infarction. results are most consistent with low/intermediate risk for significant CAD    H/O echocardiogram 5/21/13, 6/24/14    EF>55%, mild concentric LVH    History of echocardiogram 11/27/2018    EF of 60%. evidence of moderate diastolic dysfunction is seen.  Hx of cardiac catheterization 08/09/2018    MIld CAD w/o any significant focal stenosis with a normal LV end diastolic pressure LVEDP interestingly the pt developed pretty pronounced ST depression on Lt main engagment with mod chest pressure which resolved o disengagement of the LT main    Hx of heart artery stent 10/06/2011    Lesion of Prox RCA 75% stenosis-Drug Eluting stent to RCA    Hx of percutaneous left heart catheterization 04/07/2011    LMCA, LAD & RAMUS- normal, LCX-lesion on Mid CX 40% stenosis, RCA-Patent stent, EF 60%    Hx of percutaneous left heart catheterization 04/11/2012    Normal renals, Patent RCA stents with mid LCX stenosis IVUS suggest 60-70% stenosis and due to continues ischemia, a LISA was inserted    Hx of percutaneous left heart catheterization 09/27/2012    LMCA & LAD -Normal, LCXMild luminal irregularities, Patent prior stent, RCA-Patent stents in Prox and mid segment    Hyperlipidemia     Normal nuclear stress test 06/06/2012       CURRENT ALLERGIES: Benadryl [diphenhydramine], Iv dye [iodides], and Pcn [penicillins] REVIEW OF SYSTEMS: 14 systems were reviewed. Pertinent positives and negatives as above, all else negative. Past Surgical History:   Procedure Laterality Date    CARDIAC CATHETERIZATION Left 08/09/2018    right radial/ [employer] [facility]/ Dr Love significant coronary artery disease. Widely patent RCA stent.  However, the patient did have pretty severe ST depression on left main engagement with moderate chest pressure 131 that resolved on disengagement    CARPAL TUNNEL RELEASE      CORONARY ANGIOPLASTY WITH STENT PLACEMENT      NECK SURGERY      disc replaced in neck    Social History:  Social History     Tobacco Use    Smoking status: Former Smoker     Packs/day: 2.00     Years: 20.00     Pack years: 40.00     Types: Cigarettes     Quit date: 2000     Years since quittin.6    Smokeless tobacco: Never Used   Substance Use Topics    Alcohol use: Yes     Comment: social    Drug use: No        CURRENT MEDICATIONS:  Outpatient Medications Marked as Taking for the 20 encounter (Office Visit) with Linda Avelar MD   Medication Sig Dispense Refill    Fluticasone furoate-vilanterol (BREO ELLIPTA) 200-25 MCG/INH AEPB inhaler Inhale 1 puff into the lungs daily      nitroGLYCERIN (NITROSTAT) 0.4 MG SL tablet DISSOLVE 1 TABLET UNDER THE TONGUE EVERY 5 MINUTES AS  NEEDED FOR CHEST PAIN. MAX  OF 3 TABLETS IN 15 MINUTES. CALL 911 IF PAIN PERSISTS.  75 tablet 4    pantoprazole (PROTONIX) 20 MG tablet TAKE 1 TABLET BY MOUTH  DAILY (Patient taking differently: Take 20 mg by mouth daily TAKE 1 TABLET BY MOUTH  DAILY) 90 tablet 3    tiotropium (SPIRIVA HANDIHALER) 18 MCG inhalation capsule Inhale 1 capsule into the lungs daily 30 capsule 3    amLODIPine (NORVASC) 5 MG tablet TAKE ONE-HALF TABLET BY MOUTH DAILY 45 tablet 2    albuterol sulfate  (90 Base) MCG/ACT inhaler Inhale 2 puffs into the lungs every 6 hours as needed for Wheezing 3 Inhaler 3    rOPINIRole (REQUIP) 0.5 MG tablet TAKE 2 TABLETS BY MOUTH  DAILY 180 tablet 3    atorvastatin (LIPITOR) 40 MG tablet TAKE 1 TABLET BY MOUTH  DAILY 90 tablet 3    famciclovir (FAMVIR) 250 MG tablet Take 250 mg by mouth 2 times daily      timolol (BETIMOL) 0.5 % ophthalmic solution Place 1 drop into the right eye 2 times daily      RESTASIS 0.05 % ophthalmic emulsion Place 1 drop into both eyes 2 times daily       latanoprost (XALATAN) 0.005 % ophthalmic solution Place 1 drop into both eyes nightly       aspirin 81 MG tablet Take 81 mg by mouth daily. FAMILY HISTORY: family history includes Cancer in her brother and mother. PHYSICAL EXAM:   /84 (Site: Left Upper Arm, Position: Sitting, Cuff Size: Medium Adult)   Pulse (!) 48   Resp 18   Ht 5' 2.99\" (1.6 m)   Wt 128 lb 12.8 oz (58.4 kg)   SpO2 98%   BMI 22.82 kg/m²  Body mass index is 22.82 kg/m². Constitutional: She is oriented to person, place, and time. She appears well-developed and well-nourished. In no acute distress. HEENT: Normocephalic and atraumatic. No JVD present. Carotid bruit is not present. No mass and no thyromegaly present. No lymphadenopathy present. Cardiovascular: Normal rate, regular rhythm, normal heart sounds. Exam reveals no gallop and no friction rubs. 2/6 systolic murmur, 5th intercostal space on the LEFT in the mid-clavicular line (cardiac apex). Extra beats noted. Pulmonary/Chest: Effort normal and breath sounds normal. No respiratory distress. She has no wheezes, rhonchi or rales. Abdominal: Soft, non-tender. Bowel sounds and aorta are normal. She exhibits no organomegaly, mass or bruit. Extremities: No edema. No cyanosis and no clubbing. Pulses are 2+ radial and carotid pulses. 2+ dorsalis pedis and posterior tibial pulses bilaterally. Neurological: She is alert and oriented to person, place, and time. No evidence of gross cranial nerve deficit. Coordination appeared normal.   Skin: Skin is warm and dry. There is no rash or diaphoresis. Psychiatric: She has a normal mood and affect.  Her speech is normal and behavior is normal.      MOST RECENT LABS ON RECORD:   Lab Results   Component Value Date    WBC 5.1 08/25/2020    HGB 13.7 08/25/2020    HCT 42.4 08/25/2020     08/25/2020    CHOL 158 08/11/2015    TRIG 88 08/11/2015    HDL 64 08/25/2020    LDLCHOLESTEROL 112 08/25/2020    ALT 23 08/25/2020    AST 20 08/25/2020     (L) 08/25/2020    K 4.4 08/25/2020  08/25/2020    CREATININE 0.56 08/25/2020    BUN 17 08/25/2020    CO2 23 08/25/2020    TSH 2.86 08/25/2020    INR 0.9 07/14/2019        ASSESSMENT:  Encounter Diagnoses   Name Primary?  Non-cardiac chest pain Yes    PVC (premature ventricular contraction)     ASHD (arteriosclerotic heart disease)     Coronary vasospasm (HCC)     Essential hypertension     Mixed hyperlipidemia     Moderate aortic regurgitation       PLAN:    · Likely non cardiac chest pain, however given her known history of coronary artery disease as well as systolic murmur, I would like to do an echocardiogram to re-assess her EF. · Frequent premature ventricular contractions (PVC's)- seen on EKG done in office today 12/14/2020  · Beta Blocker: Unable to tolerate   · Calcium Channel Blocker: STOP amlodipine (Norvasc)     · Atherosclerotic Heart Disease: Hx: Stents in 2012  Antiplatelet Agent: Continue Aspirin 81 mg daily. Beta Blocker: Unable to tolerate   Calcium Channel Blocker: STOP amlodipine (Norvasc)   · Statin Therapy: Continue atorvastatin (Lipitor) 40 mg nightly. · Coronary Vasospasm: Evidence on Heart Catheterization 8/2018. · Calcium Channel Blocker: STOP amlodipine (Norvasc) 5 mg 1/2 tab once daily. Essential Hypertension: Controlled   · Beta Blocker: Unable to tolerate   · ACE Inibitor/ARB: Not indicated at this time. · Diuretics: Not indicated at this time. · Calcium Channel Blocker: STOP amlodipine (Norvasc)   · Additional Testing List: None     · Hyperlipidemia: Mixed LDL: 112 on 8/25/2020  · Statin Therapy: Continue atorvastatin (Lipitor) 40 mg nightly. · Moderate aortic regurgitation shown on echocardiogram done 11/2018. · Additional Testing List: I ordered a echocardiogram to better assess for the etiology of this problem and to help guide future management    In the meantime, I encouraged her to continue all of her current medications and follow up with you as previously scheduled. FOLLOW UP:   I told Ms. Marquez to call my office if she had any problems, but otherwise told her to Return in about 6 months (around 6/14/2021). However, I would be happy to see her sooner should the need arise. Once again, thank you for allowing me to participate in this patients care. Please do not hesitate to contact me could I be of further assistance. Sincerely,  Pari Cornejo. Bud BECKMAN, MS, F.A.C.C. [facility]ist, [address] [place] [address]  Phone: [phone], Fax: [phone]     I believe that the risk of significant morbidity and mortality related to the patient's current medical conditions are: low-intermediate. The documentation recorded by the scribe, accurately and completely reflects the services I personally performed and the decisions made by me. Bradley Zelaya MD, MS, F.A.C.C.  December 14, 2020

## 2023-09-01 NOTE — ED PROVIDER NOTE - NS_ ATTENDINGSCRIBEDETAILS _ED_A_ED_FT
I, Francesco Munoz MD,  performed the initial face to face bedside interview with this patient regarding history of present illness, review of symptoms and relevant past medical, social and family history.  I completed an independent physical examination.  I was the initial provider who evaluated this patient.   I personally saw the patient and performed a substantive portion of the visit including all aspects of the medical decision making.  The history, relevant review of systems, past medical and surgical history, medical decision making, and physical examination was documented by the scribe in my presence and I attest to the accuracy of the documentation.

## 2023-09-01 NOTE — PHYSICAL EXAM

## 2023-09-01 NOTE — ED PROVIDER NOTE - OBJECTIVE STATEMENT
81 y/o F with a PMHx of left shoulder pain, stress incontinence, eczema, low back pain, arthritis, glaucoma, chronic rhinitis presents to the ED c/o palpitations. The pt was dx with afib 3 weeks ago and placed on xarelto. Had stents placed 1 week ago with Dr. Snell and placed on plavix and metroprolol with improvement of exertional chest pressure. The patient states having sudden onset of palpitations while having dinner. Saw cardiologist Dr. Gutierres and had afib on EKG. when going up the stairs, felt chest pressure and tremors. The pt had lowered bp to 93 systolic. Was recently placed on a statin. On Wednesday night, had diarrhea, fever of 101 F, chills, and diaphoresis that subsided. Endorses occasional glass of wine. Denies SOB. 81 y/o F with a PMHx of left shoulder pain, stress incontinence, eczema, low back pain, arthritis, glaucoma, chronic rhinitis presents to the ED c/o palpitations. The pt was dx with afib 3 weeks ago and placed on xarelto. Had stents placed 1 week ago with Dr. Toney and placed on plavix and metroprolol with improvement of exertional chest pressure. The patient states having sudden onset of palpitations while having dinner. Saw cardiologist Dr. Gutierres and had afib on EKG. when going up the stairs, felt chest pressure and tremors. The pt had lowered bp to 93 systolic. Was recently placed on a statin. On Wednesday night, had diarrhea, fever of 101 F, chills, and diaphoresis that subsided. Endorses occasional glass of wine. Denies SOB.

## 2023-09-01 NOTE — ED ADULT TRIAGE NOTE - CHIEF COMPLAINT QUOTE
Pt. A&OX3, BIBEMS with c/o atrial fibrillation. EMS reports the pt. was diagnosed with A-fib 3 weeks ago, placed on Xarelto. Pt. had stent placed with MD Snell 1 week ago; placed Xarelto, Plavix and metoprolol. EMS administered 5mg of IV Metoprolol. Pt. reports having the chills, shakes and chattering teeth. After walking up the stairs to the 3rd floor felt pressure. Denies CP or fevers.   Pt. taken for STAT EKG. DISPLAY PLAN FREE TEXT

## 2023-09-01 NOTE — ED PROVIDER NOTE - PHYSICAL EXAMINATION
Constitutional: NAD AAOx3  Eyes: PERRLA EOMI  Head: Normocephalic atraumatic  Mouth: MMM  Cardiac: tachycardic  Resp: Lungs CTAB  GI: Abd s/nt/nd  Neuro: CN2-12 intact  Skin: No visible rashes Constitutional: NAD AAOx3  Eyes: PERRLA EOMI  Head: Normocephalic atraumatic  Mouth: MMM  Cardiac: tachycardic normal peripheral pulses no LE swelling  Resp: Lungs CTAB  GI: Abd s/nt/nd  Neuro: CN2-12 intact  Skin: No visible rashes

## 2023-09-01 NOTE — ASSESSMENT
[FreeTextEntry1] : A/P: ========================= *[id number]=2 (>75, CAD) -today's ECG shows afib w/ HRs 130s -pt minimally symptomatic. -prior monitoring showed infrequent afib episodes.  -Cont. BB & NOAC -cannot uptitrate BB as SBPs 90s & epatch ave sinus rates 60s -pt will return in 1  week to reassess rhythm if still if afib & lawrence. if rates in 130s or higher. will d/w pt rhythm control (ARACELIS &CV +/- antiarrhythmic) ========================= *CAD-s/p PCI LAD Aug-23-'23 -d/c imdur and see if angina recurs. -start lipitor 20 mg qhs. -cont. plavix x 1 yr no ASA given eliquis for afib. ========================= Return 1 week to reevaluate rhythm.

## 2023-09-01 NOTE — ED PROVIDER NOTE - NS ED ROS FT
Constitutional: No fever or chills  Eyes: No visual changes  HEENT: No throat pain  CV: No chest pain, +palpitations  Resp: No SOB no cough  GI: No abd pain, nausea or vomiting  : No dysuria  MSK: No musculoskeletal pain, +tremors  Skin: No rash  Neuro: No headache

## 2023-09-02 DIAGNOSIS — R77.8 OTHER SPECIFIED ABNORMALITIES OF PLASMA PROTEINS: ICD-10-CM

## 2023-09-02 DIAGNOSIS — I48.0 PAROXYSMAL ATRIAL FIBRILLATION: ICD-10-CM

## 2023-09-02 DIAGNOSIS — I25.10 ATHEROSCLEROTIC HEART DISEASE OF NATIVE CORONARY ARTERY WITHOUT ANGINA PECTORIS: ICD-10-CM

## 2023-09-02 DIAGNOSIS — R06.02 SHORTNESS OF BREATH: ICD-10-CM

## 2023-09-02 LAB
ADD ON TEST-SPECIMEN IN LAB: SIGNIFICANT CHANGE UP
ANION GAP SERPL CALC-SCNC: 7 MMOL/L — SIGNIFICANT CHANGE UP (ref 5–17)
APPEARANCE UR: CLEAR — SIGNIFICANT CHANGE UP
BILIRUB UR-MCNC: NEGATIVE — SIGNIFICANT CHANGE UP
BUN SERPL-MCNC: 24 MG/DL — HIGH (ref 7–23)
CALCIUM SERPL-MCNC: 8.5 MG/DL — SIGNIFICANT CHANGE UP (ref 8.5–10.1)
CHLORIDE SERPL-SCNC: 109 MMOL/L — HIGH (ref 96–108)
CO2 SERPL-SCNC: 21 MMOL/L — LOW (ref 22–31)
COLOR SPEC: YELLOW — SIGNIFICANT CHANGE UP
CREAT SERPL-MCNC: 1.14 MG/DL — SIGNIFICANT CHANGE UP (ref 0.5–1.3)
DIFF PNL FLD: NEGATIVE — SIGNIFICANT CHANGE UP
EGFR: 48 ML/MIN/1.73M2 — LOW
GLUCOSE BLDC GLUCOMTR-MCNC: 109 MG/DL — HIGH (ref 70–99)
GLUCOSE SERPL-MCNC: 170 MG/DL — HIGH (ref 70–99)
GLUCOSE UR QL: NEGATIVE — SIGNIFICANT CHANGE UP
KETONES UR-MCNC: NEGATIVE — SIGNIFICANT CHANGE UP
LACTATE SERPL-SCNC: 2 MMOL/L — SIGNIFICANT CHANGE UP (ref 0.7–2)
LEUKOCYTE ESTERASE UR-ACNC: NEGATIVE — SIGNIFICANT CHANGE UP
NITRITE UR-MCNC: NEGATIVE — SIGNIFICANT CHANGE UP
NT-PROBNP SERPL-SCNC: 4570 PG/ML — HIGH (ref 0–450)
NT-PROBNP SERPL-SCNC: 5547 PG/ML — HIGH (ref 0–450)
PH UR: 5 — SIGNIFICANT CHANGE UP (ref 5–8)
POTASSIUM SERPL-MCNC: 3.8 MMOL/L — SIGNIFICANT CHANGE UP (ref 3.5–5.3)
POTASSIUM SERPL-SCNC: 3.8 MMOL/L — SIGNIFICANT CHANGE UP (ref 3.5–5.3)
PROT UR-MCNC: NEGATIVE — SIGNIFICANT CHANGE UP
RAPID RVP RESULT: SIGNIFICANT CHANGE UP
SARS-COV-2 RNA SPEC QL NAA+PROBE: SIGNIFICANT CHANGE UP
SODIUM SERPL-SCNC: 137 MMOL/L — SIGNIFICANT CHANGE UP (ref 135–145)
SP GR SPEC: 1.01 — SIGNIFICANT CHANGE UP (ref 1.01–1.02)
TROPONIN I, HIGH SENSITIVITY RESULT: 1045.45 NG/L — HIGH
TROPONIN I, HIGH SENSITIVITY RESULT: 1472.44 NG/L — HIGH
TROPONIN I, HIGH SENSITIVITY RESULT: 1832.57 NG/L — HIGH
UROBILINOGEN FLD QL: NEGATIVE — SIGNIFICANT CHANGE UP

## 2023-09-02 PROCEDURE — 78226 HEPATOBILIARY SYSTEM IMAGING: CPT | Mod: MG

## 2023-09-02 PROCEDURE — 97116 GAIT TRAINING THERAPY: CPT | Mod: GP

## 2023-09-02 PROCEDURE — 99223 1ST HOSP IP/OBS HIGH 75: CPT

## 2023-09-02 PROCEDURE — 83690 ASSAY OF LIPASE: CPT

## 2023-09-02 PROCEDURE — 36415 COLL VENOUS BLD VENIPUNCTURE: CPT

## 2023-09-02 PROCEDURE — 87484 EHRLICHA CHAFFEENSIS AMP PRB: CPT

## 2023-09-02 PROCEDURE — 85025 COMPLETE CBC W/AUTO DIFF WBC: CPT

## 2023-09-02 PROCEDURE — 84100 ASSAY OF PHOSPHORUS: CPT

## 2023-09-02 PROCEDURE — 84145 PROCALCITONIN (PCT): CPT

## 2023-09-02 PROCEDURE — 86663 EPSTEIN-BARR ANTIBODY: CPT

## 2023-09-02 PROCEDURE — A9537: CPT

## 2023-09-02 PROCEDURE — 86757 RICKETTSIA ANTIBODY: CPT

## 2023-09-02 PROCEDURE — 84443 ASSAY THYROID STIM HORMONE: CPT

## 2023-09-02 PROCEDURE — 71045 X-RAY EXAM CHEST 1 VIEW: CPT | Mod: 26,59

## 2023-09-02 PROCEDURE — 71260 CT THORAX DX C+: CPT | Mod: 26,MA

## 2023-09-02 PROCEDURE — 87040 BLOOD CULTURE FOR BACTERIA: CPT

## 2023-09-02 PROCEDURE — 86618 LYME DISEASE ANTIBODY: CPT

## 2023-09-02 PROCEDURE — 83880 ASSAY OF NATRIURETIC PEPTIDE: CPT

## 2023-09-02 PROCEDURE — 85652 RBC SED RATE AUTOMATED: CPT

## 2023-09-02 PROCEDURE — 86644 CMV ANTIBODY: CPT

## 2023-09-02 PROCEDURE — 82962 GLUCOSE BLOOD TEST: CPT

## 2023-09-02 PROCEDURE — G1004: CPT

## 2023-09-02 PROCEDURE — 87798 DETECT AGENT NOS DNA AMP: CPT

## 2023-09-02 PROCEDURE — 97161 PT EVAL LOW COMPLEX 20 MIN: CPT | Mod: GP

## 2023-09-02 PROCEDURE — 83735 ASSAY OF MAGNESIUM: CPT

## 2023-09-02 PROCEDURE — 93005 ELECTROCARDIOGRAM TRACING: CPT

## 2023-09-02 PROCEDURE — 83615 LACTATE (LD) (LDH) ENZYME: CPT

## 2023-09-02 PROCEDURE — 82728 ASSAY OF FERRITIN: CPT

## 2023-09-02 PROCEDURE — 84436 ASSAY OF TOTAL THYROXINE: CPT

## 2023-09-02 PROCEDURE — 80076 HEPATIC FUNCTION PANEL: CPT

## 2023-09-02 PROCEDURE — 99222 1ST HOSP IP/OBS MODERATE 55: CPT

## 2023-09-02 PROCEDURE — 86788 WEST NILE VIRUS AB IGM: CPT

## 2023-09-02 PROCEDURE — 97530 THERAPEUTIC ACTIVITIES: CPT | Mod: GP

## 2023-09-02 PROCEDURE — 86789 WEST NILE VIRUS ANTIBODY: CPT

## 2023-09-02 PROCEDURE — 81003 URINALYSIS AUTO W/O SCOPE: CPT

## 2023-09-02 PROCEDURE — 83036 HEMOGLOBIN GLYCOSYLATED A1C: CPT

## 2023-09-02 PROCEDURE — 84484 ASSAY OF TROPONIN QUANT: CPT

## 2023-09-02 PROCEDURE — 71045 X-RAY EXAM CHEST 1 VIEW: CPT

## 2023-09-02 PROCEDURE — 93306 TTE W/DOPPLER COMPLETE: CPT | Mod: 26

## 2023-09-02 PROCEDURE — 87468 ANAPLSMA PHGCYTOPHLM AMP PRB: CPT

## 2023-09-02 PROCEDURE — 86645 CMV ANTIBODY IGM: CPT

## 2023-09-02 PROCEDURE — 86665 EPSTEIN-BARR CAPSID VCA: CPT

## 2023-09-02 PROCEDURE — 74177 CT ABD & PELVIS W/CONTRAST: CPT | Mod: 26,MA

## 2023-09-02 PROCEDURE — 83605 ASSAY OF LACTIC ACID: CPT

## 2023-09-02 PROCEDURE — 86140 C-REACTIVE PROTEIN: CPT

## 2023-09-02 PROCEDURE — 86664 EPSTEIN-BARR NUCLEAR ANTIGEN: CPT

## 2023-09-02 PROCEDURE — 80048 BASIC METABOLIC PNL TOTAL CA: CPT

## 2023-09-02 PROCEDURE — 80053 COMPREHEN METABOLIC PANEL: CPT

## 2023-09-02 PROCEDURE — 71250 CT THORAX DX C-: CPT

## 2023-09-02 PROCEDURE — 71046 X-RAY EXAM CHEST 2 VIEWS: CPT | Mod: 26

## 2023-09-02 PROCEDURE — 99291 CRITICAL CARE FIRST HOUR: CPT

## 2023-09-02 PROCEDURE — 85027 COMPLETE CBC AUTOMATED: CPT

## 2023-09-02 PROCEDURE — 94640 AIRWAY INHALATION TREATMENT: CPT

## 2023-09-02 RX ORDER — PIPERACILLIN AND TAZOBACTAM 4; .5 G/20ML; G/20ML
3.38 INJECTION, POWDER, LYOPHILIZED, FOR SOLUTION INTRAVENOUS ONCE
Refills: 0 | Status: COMPLETED | OUTPATIENT
Start: 2023-09-02 | End: 2023-09-02

## 2023-09-02 RX ORDER — METOPROLOL TARTRATE 50 MG
25 TABLET ORAL
Refills: 0 | Status: DISCONTINUED | OUTPATIENT
Start: 2023-09-02 | End: 2023-09-09

## 2023-09-02 RX ORDER — HYDROXYUREA 500 MG/1
500 CAPSULE ORAL
Refills: 0 | DISCHARGE

## 2023-09-02 RX ORDER — ALBUTEROL 90 UG/1
2.5 AEROSOL, METERED ORAL EVERY 6 HOURS
Refills: 0 | Status: DISCONTINUED | OUTPATIENT
Start: 2023-09-02 | End: 2023-09-02

## 2023-09-02 RX ORDER — ALBUTEROL 90 UG/1
2.5 AEROSOL, METERED ORAL EVERY 6 HOURS
Refills: 0 | Status: DISCONTINUED | OUTPATIENT
Start: 2023-09-02 | End: 2023-09-03

## 2023-09-02 RX ORDER — RIVAROXABAN 15 MG-20MG
1 KIT ORAL
Refills: 0 | DISCHARGE

## 2023-09-02 RX ORDER — HYDROXYUREA 500 MG/1
500 CAPSULE ORAL
Refills: 0 | Status: DISCONTINUED | OUTPATIENT
Start: 2023-09-02 | End: 2023-09-15

## 2023-09-02 RX ORDER — ACETAMINOPHEN 500 MG
650 TABLET ORAL EVERY 6 HOURS
Refills: 0 | Status: DISCONTINUED | OUTPATIENT
Start: 2023-09-02 | End: 2023-09-15

## 2023-09-02 RX ORDER — LORATADINE 10 MG/1
1 TABLET ORAL
Refills: 0 | DISCHARGE

## 2023-09-02 RX ORDER — ISOSORBIDE DINITRATE 5 MG/1
1 TABLET ORAL
Refills: 0 | DISCHARGE

## 2023-09-02 RX ORDER — SODIUM CHLORIDE 9 MG/ML
1000 INJECTION INTRAMUSCULAR; INTRAVENOUS; SUBCUTANEOUS
Refills: 0 | Status: DISCONTINUED | OUTPATIENT
Start: 2023-09-02 | End: 2023-09-02

## 2023-09-02 RX ORDER — PIPERACILLIN AND TAZOBACTAM 4; .5 G/20ML; G/20ML
3.38 INJECTION, POWDER, LYOPHILIZED, FOR SOLUTION INTRAVENOUS EVERY 8 HOURS
Refills: 0 | Status: DISCONTINUED | OUTPATIENT
Start: 2023-09-03 | End: 2023-09-07

## 2023-09-02 RX ORDER — FUROSEMIDE 40 MG
40 TABLET ORAL EVERY 12 HOURS
Refills: 0 | Status: DISCONTINUED | OUTPATIENT
Start: 2023-09-02 | End: 2023-09-03

## 2023-09-02 RX ORDER — ONDANSETRON 8 MG/1
4 TABLET, FILM COATED ORAL EVERY 8 HOURS
Refills: 0 | Status: DISCONTINUED | OUTPATIENT
Start: 2023-09-02 | End: 2023-09-15

## 2023-09-02 RX ORDER — LANOLIN ALCOHOL/MO/W.PET/CERES
3 CREAM (GRAM) TOPICAL AT BEDTIME
Refills: 0 | Status: DISCONTINUED | OUTPATIENT
Start: 2023-09-02 | End: 2023-09-03

## 2023-09-02 RX ORDER — LORATADINE 10 MG/1
10 TABLET ORAL DAILY
Refills: 0 | Status: DISCONTINUED | OUTPATIENT
Start: 2023-09-02 | End: 2023-09-03

## 2023-09-02 RX ORDER — FUROSEMIDE 40 MG
20 TABLET ORAL ONCE
Refills: 0 | Status: COMPLETED | OUTPATIENT
Start: 2023-09-02 | End: 2023-09-02

## 2023-09-02 RX ORDER — PIPERACILLIN AND TAZOBACTAM 4; .5 G/20ML; G/20ML
3.38 INJECTION, POWDER, LYOPHILIZED, FOR SOLUTION INTRAVENOUS ONCE
Refills: 0 | Status: COMPLETED | OUTPATIENT
Start: 2023-09-03 | End: 2023-09-03

## 2023-09-02 RX ORDER — CLOPIDOGREL BISULFATE 75 MG/1
75 TABLET, FILM COATED ORAL DAILY
Refills: 0 | Status: DISCONTINUED | OUTPATIENT
Start: 2023-09-02 | End: 2023-09-15

## 2023-09-02 RX ORDER — RIVAROXABAN 15 MG-20MG
15 KIT ORAL
Refills: 0 | Status: DISCONTINUED | OUTPATIENT
Start: 2023-09-02 | End: 2023-09-15

## 2023-09-02 RX ORDER — DORZOLAMIDE HYDROCHLORIDE TIMOLOL MALEATE 20; 5 MG/ML; MG/ML
1 SOLUTION/ DROPS OPHTHALMIC
Refills: 0 | Status: DISCONTINUED | OUTPATIENT
Start: 2023-09-02 | End: 2023-09-15

## 2023-09-02 RX ORDER — ATORVASTATIN CALCIUM 80 MG/1
20 TABLET, FILM COATED ORAL AT BEDTIME
Refills: 0 | Status: DISCONTINUED | OUTPATIENT
Start: 2023-09-02 | End: 2023-09-15

## 2023-09-02 RX ADMIN — Medication 25 MILLIGRAM(S): at 11:32

## 2023-09-02 RX ADMIN — RIVAROXABAN 15 MILLIGRAM(S): KIT at 18:52

## 2023-09-02 RX ADMIN — LORATADINE 10 MILLIGRAM(S): 10 TABLET ORAL at 11:32

## 2023-09-02 RX ADMIN — ALBUTEROL 2.5 MILLIGRAM(S): 90 AEROSOL, METERED ORAL at 04:48

## 2023-09-02 RX ADMIN — Medication 40 MILLIGRAM(S): at 14:53

## 2023-09-02 RX ADMIN — ALBUTEROL 2.5 MILLIGRAM(S): 90 AEROSOL, METERED ORAL at 14:18

## 2023-09-02 RX ADMIN — DORZOLAMIDE HYDROCHLORIDE TIMOLOL MALEATE 1 DROP(S): 20; 5 SOLUTION/ DROPS OPHTHALMIC at 23:32

## 2023-09-02 RX ADMIN — Medication 650 MILLIGRAM(S): at 05:10

## 2023-09-02 RX ADMIN — ALBUTEROL 2.5 MILLIGRAM(S): 90 AEROSOL, METERED ORAL at 20:33

## 2023-09-02 RX ADMIN — PIPERACILLIN AND TAZOBACTAM 200 GRAM(S): 4; .5 INJECTION, POWDER, LYOPHILIZED, FOR SOLUTION INTRAVENOUS at 16:26

## 2023-09-02 RX ADMIN — Medication 650 MILLIGRAM(S): at 15:30

## 2023-09-02 RX ADMIN — DORZOLAMIDE HYDROCHLORIDE TIMOLOL MALEATE 1 DROP(S): 20; 5 SOLUTION/ DROPS OPHTHALMIC at 11:39

## 2023-09-02 RX ADMIN — ATORVASTATIN CALCIUM 20 MILLIGRAM(S): 80 TABLET, FILM COATED ORAL at 21:39

## 2023-09-02 RX ADMIN — Medication 650 MILLIGRAM(S): at 15:00

## 2023-09-02 RX ADMIN — SODIUM CHLORIDE 100 MILLILITER(S): 9 INJECTION INTRAMUSCULAR; INTRAVENOUS; SUBCUTANEOUS at 00:56

## 2023-09-02 RX ADMIN — CLOPIDOGREL BISULFATE 75 MILLIGRAM(S): 75 TABLET, FILM COATED ORAL at 11:32

## 2023-09-02 RX ADMIN — PIPERACILLIN AND TAZOBACTAM 25 GRAM(S): 4; .5 INJECTION, POWDER, LYOPHILIZED, FOR SOLUTION INTRAVENOUS at 23:32

## 2023-09-02 RX ADMIN — Medication 20 MILLIGRAM(S): at 11:32

## 2023-09-02 RX ADMIN — ALBUTEROL 2.5 MILLIGRAM(S): 90 AEROSOL, METERED ORAL at 08:52

## 2023-09-02 NOTE — PROGRESS NOTE ADULT - ASSESSMENT
Chest  pain on admission   Elevated troponin  h/o CAD s/p 1 stent 1 week ago  9/2 no active chest pain now, has acute hypoxic resp failure - EKG without acute st tw changes, in sinus  consider acute CHF - possibly systolic   -elevated troponin may be from prior recent procedure   -continue Asprin, plavix, statin  -follow Echo, UG to CCU - further work-up there - pt noted to have fever afterwards, further w/u by ICU team  consider repeat cath if appropriate. Appreciate Card and Critical Care help       A fib with RVR  -NSR now  -continue Metoprolol and Xarelto      Xarelto for DVT ppx      FULL CODE

## 2023-09-02 NOTE — PROGRESS NOTE ADULT - ASSESSMENT
Patient with recent stent(1 week ago with stent to LAD)  presented with weakness, afib sob,  had nstemi troponin to 1400  now with episode of emesis, acute sob, no fever to 104  patient complains of back pain, fever, sob  EKG no change    1. Plan transferred t CCU for acute hypoxia, ? chf given dose lasix  2. Fever source not clear, blood cultures, check u/a lactate, will start antibiotics  3. trend troponin,   4. will ct to look for source of infection.  5. if diarrhea send c. dif  6. continue xarelto,      Patient with recent stent(1 week ago with stent to LAD)  presented with weakness, afib sob,  had nstemi troponin to 1400  now with episode of emesis, acute sob, no fever to 104  patient complains of back pain, fever, sob  EKG no change  Focus IVC 2 cm not collapsing with inspiration, normal lv function, normal b lines    1. Plan transferred t CCU for acute hypoxia, ? chf given dose lasix  2. Fever source not clear, blood cultures, check u/a lactate, will start antibiotics  3. trend troponin,   4. will ct to look for source of infection.  5. if diarrhea send c. dif  6. continue xarelto,

## 2023-09-02 NOTE — PROVIDER CONTACT NOTE (OTHER) - ASSESSMENT
Pt with RR 28, SPO2 69-71% on RA, wheezing, difficulty speaking, B/L UE tremors. States her SOB has never been this severe before.

## 2023-09-02 NOTE — PATIENT PROFILE ADULT - FALL HARM RISK - HARM RISK INTERVENTIONS

## 2023-09-02 NOTE — CONSULT NOTE ADULT - SUBJECTIVE AND OBJECTIVE BOX
CHIEF COMPLAINT: shortness of breath , some epigastric burning sensation on exertion yesterday evening     HPI:  83 y/o Female with a PMHx of left shoulder pain, stress incontinence, eczema, low back pain, arthritis, glaucoma, chronic rhinitis presents to the ED with complain of palpitations and dyspnea on exertion started yesterday evening  The patient was diagnosed with afib 3 weeks ago and placed on xarelto  , had subsequent cardiac cath for exertional chest tightness , noted LAD disease which was stented last week  , with improvement in symptoms  , patient was not feeling well for last couple of days with chills , fever , loose BM , got better  yesterday  without cough  Patient was in afib yesterday during his exam with her cardiologist , went home , started noticing shortness of breath , on taking different from prior to her stent , decided to come to ER where she was noted to have afib with rapid rate , patient did recieve IVF in ER and as well she drinking salty soups , eating salty crackers at home ,  patient heart rate improved in ER , developed carmela night SOB with desaturation last night , without chest pain , was placed on oxygen  , Patient Blood work showed elevated troponin            PAST MEDICAL & SURGICAL HISTORY:  Chronic rhinitis      Glaucoma (increased eye pressure)      Arthritis      Low back pain      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          Allergies    No Known Allergies    Intolerances        SOCIAL HISTORY:    Social History:  Lives at home.  Non smoker.  Non alcoholic. (02 Sep 2023 00:42)      FAMILY HISTORY:  Family history of prostate cancer in father (Father)    Family history of glioblastoma (Mother, Sibling)    Family history of coronary artery disease (Sibling)    Family history of diabetes mellitus (DM) (Sibling)        MEDICATIONS:Home Medications:  atorvastatin 20 mg oral tablet: 1 tab(s) orally once a day (02 Sep 2023 00:12)  dorzolamide-timolol 2.23%-0.68% (2%-0.5% base) ophthalmic solution: 1 drop(s) in each eye 2 times a day (02 Sep 2023 00:12)  hydroxyurea 500 mg oral capsule: 500 milligram(s) orally once a day take on M-W-F (02 Sep 2023 00:12)  loratadine 10 mg oral tablet: 1 tab(s) orally once a day (at bedtime) (02 Sep 2023 00:12)  metoprolol tartrate 25 mg oral tablet: 1 tab(s) orally 2 times a day (02 Sep 2023 00:12)  Xarelto 20 mg oral tablet: 1 tab(s) orally once a day (at bedtime) (02 Sep 2023 00:12)    MEDICATIONS  (STANDING):  albuterol    0.083% 2.5 milliGRAM(s) Nebulizer every 6 hours  atorvastatin 20 milliGRAM(s) Oral at bedtime  clopidogrel Tablet 75 milliGRAM(s) Oral daily  dorzolamide 2%/timolol 0.5% Ophthalmic Solution 1 Drop(s) Both EYES two times a day  furosemide   Injectable 20 milliGRAM(s) IV Push once  hydroxyurea 500 milliGRAM(s) Oral <User Schedule>  loratadine 10 milliGRAM(s) Oral daily  metoprolol tartrate 25 milliGRAM(s) Oral two times a day  rivaroxaban 15 milliGRAM(s) Oral with dinner    MEDICATIONS  (PRN):  acetaminophen     Tablet .. 650 milliGRAM(s) Oral every 6 hours PRN Temp greater or equal to 38C (100.4F), Mild Pain (1 - 3)  aluminum hydroxide/magnesium hydroxide/simethicone Suspension 30 milliLiter(s) Oral every 4 hours PRN Dyspepsia  melatonin 3 milliGRAM(s) Oral at bedtime PRN Insomnia  ondansetron Injectable 4 milliGRAM(s) IV Push every 8 hours PRN Nausea and/or Vomiting      REVIEW OF SYSTEMS:  as above   CONSTITUTIONAL: No weakness, fevers or chillsat admission   EYES/ENT: No visual changes;  No vertigo or throat pain   NECK: No pain or stiffness  RESPIRATORY: No cough, wheezing, hemoptysis; No shortness of breath  CARDIOVASCULAR: No chest pain or palpitations  GASTROINTESTINAL: No abdominal or epigastric pain. No nausea, vomiting, or hematemesis; No diarrhea or constipation. No melena or hematochezia.  GENITOURINARY: No dysuria, frequency or hematuria  NEUROLOGICAL: No numbness or weakness  SKIN: No itching, burning, rashes, or lesions   All other review of systems is negative unless indicated above    Vital Signs Last 24 Hrs  T(C): 36.6 (02 Sep 2023 08:59), Max: 37.6 (02 Sep 2023 00:01)  T(F): 97.8 (02 Sep 2023 08:59), Max: 99.6 (02 Sep 2023 00:01)  HR: 81 (02 Sep 2023 08:59) (77 - 133)  BP: 116/67 (02 Sep 2023 08:59) (94/53 - 137/75)  BP(mean): --  RR: 19 (02 Sep 2023 08:59) (19 - 28)  SpO2: 98% (02 Sep 2023 08:59) (93% - 100%)    Parameters below as of 02 Sep 2023 08:59  Patient On (Oxygen Delivery Method): nasal cannula  O2 Flow (L/min): 4      I&O's Summary      PHYSICAL EXAM:    Constitutional: NAD, awake and alert, well-developed  HEENT: PERR, EOMI,  No oral cyananosis.  Neck:  supple,  No JVD  Respiratory: Breath sounds equal minimal scattered wheeze , scanty basal crackles   Cardiovascular: S1 and S2, regular rate and rhythm, no Murmurs, gallops or rubs  Gastrointestinal: Bowel Sounds present, soft, nontender.   Extremities: No peripheral edema. No clubbing or cyanosis.  Vascular: 2+ peripheral pulses  Neurological: A/O x 3, no focal deficits  Musculoskeletal: no calf tenderness.  Skin: No rashes.      LABS: All Labs Reviewed:                        13.2   8.89  )-----------( 358      ( 01 Sep 2023 22:10 )             39.7     01 Sep 2023 22:10    137    |  109    |  24     ----------------------------<  135    4.9     |  23     |  1.23     Ca    8.5        01 Sep 2023 22:10    TPro  6.8    /  Alb  3.0    /  TBili  0.4    /  DBili  x      /  AST  46     /  ALT  32     /  AlkPhos  59     01 Sep 2023 22:10    PT/INR - ( 01 Sep 2023 22:32 )   PT: 23.3 sec;   INR: 2.11 ratio         PTT - ( 01 Sep 2023 22:32 )  PTT:36.0 sec        Blood Culture:     - TroponinI hsT: <-1472.44, <-1832.57, <-1659.38    RADIOLOGY/EKG:  e< from: 12 Lead ECG (09.01.23 @ 20:30) >  Atrial flutter with variable A-V block  ST & T wave abnormality, consider inferior ischemia  ST & T wave abnormality, consider anterior ischemia  Abnormal ECG  Confirmed by FREDERICK VU MD ([id number]) on 9/2/2023 9:57:39 AM  - TroponinI hsT: <-1472.44, <-1832.57, <-1659.38  < end of copied text >  < from: 12 Lead ECG (09.01.23 @ 23:27) >  Normal sinus rhythm  Normal ECG  Confirmed by FREDERICK VU MD ([id number]) on 9/2/2023 9:59:05 AM    < end of copied text >  < from: Cardiac Catheterization (08.23.23 @ 15:35) >  oronary Angiography   The coronary circulation is right dominant.      LM   Left main artery: Angiography shows mild atherosclerosis.      LAD   Proximal left anterior descending: There is an 80 % stenosis.   NELSON was placed     CX   Proximal circumflex: There is a 30 % stenosis.      RCA   Right coronary artery: Angiography shows mild atherosclerosis.      Left Heart Cath   Left ventricular function was assessed. Ejection fraction was visually  estimated by estimation. LV to AO pullback was  performed. LHC performed: Aortic valve crossed and left ventricular  pressure obtained.    < end of copied text >

## 2023-09-02 NOTE — PROGRESS NOTE ADULT - SUBJECTIVE AND OBJECTIVE BOX
responded to RRT for hypoxic respiratory failure    HPI:     This Patient is an 82 year old female with PMH HTN, PAF (diagnosed 3 weeks ago), who  had a Left heart cath 1 week ago.  Cath showed 80% LAD lesion, 30%, Circumflex lesion,  ef was 55%. The patient was on Xarelto and Plavix.  Patient was admitted with shortness of breathe  and palpitations She was in Rapid afib.  Troponin peaked at 1600,   Patient today acute became sob, with hypoxia to 70s, EKG no acute changes, with bouts of emesis  Patient transferred to CCU  On arrival to CCU temp to 104       PMH:                       MEDICATIONS  (STANDING):  albuterol    0.083% 2.5 milliGRAM(s) Nebulizer every 6 hours  atorvastatin 20 milliGRAM(s) Oral at bedtime  clopidogrel Tablet 75 milliGRAM(s) Oral daily  dorzolamide 2%/timolol 0.5% Ophthalmic Solution 1 Drop(s) Both EYES two times a day  furosemide   Injectable 40 milliGRAM(s) IV Push every 12 hours  hydroxyurea 500 milliGRAM(s) Oral <User Schedule>  loratadine 10 milliGRAM(s) Oral daily  metoprolol tartrate 25 milliGRAM(s) Oral two times a day  rivaroxaban 15 milliGRAM(s) Oral with dinner    MEDICATIONS  (PRN):  acetaminophen     Tablet .. 650 milliGRAM(s) Oral every 6 hours PRN Temp greater or equal to 38C (100.4F), Mild Pain (1 - 3)  aluminum hydroxide/magnesium hydroxide/simethicone Suspension 30 milliLiter(s) Oral every 4 hours PRN Dyspepsia  melatonin 3 milliGRAM(s) Oral at bedtime PRN Insomnia  ondansetron Injectable 4 milliGRAM(s) IV Push every 8 hours PRN Nausea and/or Vomiting                Height (cm): 172.7 (09-01 @ 20:25)  Weight (kg): 70.8 (09-02 @ 04:32)  BMI (kg/m2): 23.7 (09-02 @ 04:32)    ICU Vital Signs Last 24 Hrs  T(C): 36.6 (02 Sep 2023 08:59), Max: 37.6 (02 Sep 2023 00:01)  T(F): 97.8 (02 Sep 2023 08:59), Max: 99.6 (02 Sep 2023 00:01)  HR: 86 (02 Sep 2023 14:29) (77 - 133)  BP: 116/67 (02 Sep 2023 08:59) (94/53 - 137/75)  BP(mean): --  ABP: --  ABP(mean): --  RR: 19 (02 Sep 2023 08:59) (19 - 28)  SpO2: 98% (02 Sep 2023 08:59) (93% - 100%)    O2 Parameters below as of 02 Sep 2023 08:59  Patient On (Oxygen Delivery Method): nasal cannula  O2 Flow (L/min): 4              I&O's Summary                                     13.2   8.89  )-----------( 358      ( 01 Sep 2023 22:10 )             39.7       09-01    137  |  109<H>  |  24<H>  ----------------------------<  135<H>  4.9   |  23  |  1.23    Ca    8.5      01 Sep 2023 22:10    TPro  6.8  /  Alb  3.0<L>  /  TBili  0.4  /  DBili  x   /  AST  46<H>  /  ALT  32  /  AlkPhos  59  09-01                Urinalysis Basic - ( 01 Sep 2023 22:10 )    Color: x / Appearance: x / SG: x / pH: x  Gluc: 135 mg/dL / Ketone: x  / Bili: x / Urobili: x   Blood: x / Protein: x / Nitrite: x   Leuk Esterase: x / RBC: x / WBC x   Sq Epi: x / Non Sq Epi: x / Bacteria: x        DVT Prophylaxis:                                                                 Advanced Directives:                      responded to RRT for hypoxic respiratory failure    HPI:     This Patient is an 82 year old female with PMH HTN, PAF (diagnosed 3 weeks ago), who  had a Left heart cath 1 week ago.  Cath showed 80% LAD lesion, 30%, Circumflex lesion,  ef was 55%. The patient was on Xarelto and Plavix.  Patient was admitted with shortness of breathe  and palpitations She was in Rapid afib.  Troponin peaked at 1600,   Patient today acute became sob, with hypoxia to 70s, EKG no acute changes, with bouts of emesis  Patient transferred to CCU  On arrival to CCU temp to 104  was given dose of Lasix.    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     PAST SURGICAL HISTORY:  Elective surgery "vaginal tissue biopsy" 2000  H/O colonoscopy last done 2008?  History of tonsillectomy as a child.        MEDICATIONS  (STANDING):  albuterol    0.083% 2.5 milliGRAM(s) Nebulizer every 6 hours  atorvastatin 20 milliGRAM(s) Oral at bedtime  clopidogrel Tablet 75 milliGRAM(s) Oral daily  dorzolamide 2%/timolol 0.5% Ophthalmic Solution 1 Drop(s) Both EYES two times a day  furosemide   Injectable 40 milliGRAM(s) IV Push every 12 hours  hydroxyurea 500 milliGRAM(s) Oral <User Schedule>  loratadine 10 milliGRAM(s) Oral daily  metoprolol tartrate 25 milliGRAM(s) Oral two times a day  rivaroxaban 15 milliGRAM(s) Oral with dinner    MEDICATIONS  (PRN):  acetaminophen     Tablet .. 650 milliGRAM(s) Oral every 6 hours PRN Temp greater or equal to 38C (100.4F), Mild Pain (1 - 3)  aluminum hydroxide/magnesium hydroxide/simethicone Suspension 30 milliLiter(s) Oral every 4 hours PRN Dyspepsia  melatonin 3 milliGRAM(s) Oral at bedtime PRN Insomnia  ondansetron Injectable 4 milliGRAM(s) IV Push every 8 hours PRN Nausea and/or Vomiting    Height (cm): 172.7 (09-01 @ 20:25)  Weight (kg): 70.8 (09-02 @ 04:32)  BMI (kg/m2): 23.7 (09-02 @ 04:32)    ICU Vital Signs Last 24 Hrs  T(C): 36.6 (02 Sep 2023 08:59), Max: 37.6 (02 Sep 2023 00:01)  T(F): 97.8 (02 Sep 2023 08:59), Max: 99.6 (02 Sep 2023 00:01)  HR: 86 (02 Sep 2023 14:29) (77 - 133)  BP: 116/67 (02 Sep 2023 08:59) (94/53 - 137/75)  BP(mean): --  ABP: --  ABP(mean): --  RR: 19 (02 Sep 2023 08:59) (19 - 28)  SpO2: 98% (02 Sep 2023 08:59) (93% - 100%)    O2 Parameters below as of 02 Sep 2023 08:59  Patient On (Oxygen Delivery Method): nasal cannula  O2 Flow (L/min): 4    Physical Exam    General - ill, dyspneic  HEENT nc/at  neck no jvd  lung bilateral crackles  cv rrr, ekg no changes  abdomen soft  extreity no edema  skin no rash  gu no cva tenderness    I&O's Summary                       13.2   8.89  )-----------( 358      ( 01 Sep 2023 22:10 )             39.7       09-01    137  |  109<H>  |  24<H>  ----------------------------<  135<H>  4.9   |  23  |  1.23    Ca    8.5      01 Sep 2023 22:10    TPro  6.8  /  Alb  3.0<L>  /  TBili  0.4  /  DBili  x   /  AST  46<H>  /  ALT  32  /  AlkPhos  59  09-01    Urinalysis Basic - ( 01 Sep 2023 22:10 )    Color: x / Appearance: x / SG: x / pH: x  Gluc: 135 mg/dL / Ketone: x  / Bili: x / Urobili: x   Blood: x / Protein: x / Nitrite: x   Leuk Esterase: x / RBC: x / WBC x   Sq Epi: x / Non Sq Epi: x / Bacteria: x    DVT Prophylaxis:  Xarelto                                                               Advanced Directives: Full Code                      responded to RRT for hypoxic respiratory failure    HPI:     This Patient is an 82 year old female with PMH HTN, PAF (diagnosed 3 weeks ago), who  had a Left heart cath 1 week ago.  Cath showed 80% LAD lesion, 30%, Circumflex lesion,  ef was 55%. The patient was on Xarelto and Plavix.  Patient was admitted with shortness of breathe  and palpitations She was in Rapid afib.  Troponin peaked at 1600,   Patient today acute became sob, with hypoxia to 70s, EKG no acute changes, with bouts of emesis  Patient transferred to CCU  On arrival to CCU temp to 104  was given dose of Lasix.  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     PAST SURGICAL HISTORY:  Elective surgery "vaginal tissue biopsy" 2000  H/O colonoscopy last done 2008?  History of tonsillectomy as a child.        MEDICATIONS  (STANDING):  albuterol    0.083% 2.5 milliGRAM(s) Nebulizer every 6 hours  atorvastatin 20 milliGRAM(s) Oral at bedtime  clopidogrel Tablet 75 milliGRAM(s) Oral daily  dorzolamide 2%/timolol 0.5% Ophthalmic Solution 1 Drop(s) Both EYES two times a day  furosemide   Injectable 40 milliGRAM(s) IV Push every 12 hours  hydroxyurea 500 milliGRAM(s) Oral <User Schedule>  loratadine 10 milliGRAM(s) Oral daily  metoprolol tartrate 25 milliGRAM(s) Oral two times a day  rivaroxaban 15 milliGRAM(s) Oral with dinner    MEDICATIONS  (PRN):  acetaminophen     Tablet .. 650 milliGRAM(s) Oral every 6 hours PRN Temp greater or equal to 38C (100.4F), Mild Pain (1 - 3)  aluminum hydroxide/magnesium hydroxide/simethicone Suspension 30 milliLiter(s) Oral every 4 hours PRN Dyspepsia  melatonin 3 milliGRAM(s) Oral at bedtime PRN Insomnia  ondansetron Injectable 4 milliGRAM(s) IV Push every 8 hours PRN Nausea and/or Vomiting    Height (cm): 172.7 (09-01 @ 20:25)  Weight (kg): 70.8 (09-02 @ 04:32)  BMI (kg/m2): 23.7 (09-02 @ 04:32)    ICU Vital Signs Last 24 Hrs  T(C): 36.6 (02 Sep 2023 08:59), Max: 37.6 (02 Sep 2023 00:01)  T(F): 97.8 (02 Sep 2023 08:59), Max: 99.6 (02 Sep 2023 00:01)  HR: 86 (02 Sep 2023 14:29) (77 - 133)  BP: 116/67 (02 Sep 2023 08:59) (94/53 - 137/75)  BP(mean): --  ABP: --  ABP(mean): --  RR: 19 (02 Sep 2023 08:59) (19 - 28)  SpO2: 98% (02 Sep 2023 08:59) (93% - 100%)    O2 Parameters below as of 02 Sep 2023 08:59  Patient On (Oxygen Delivery Method): nasal cannula  O2 Flow (L/min): 4    Physical Exam    General - ill, dyspneic  HEENT nc/at  neck no jvd  lung bilateral crackles  cv rrr, ekg no changes  abdomen soft  extreity no edema  skin no rash  gu no cva tenderness    I&O's Summary                       13.2   8.89  )-----------( 358      ( 01 Sep 2023 22:10 )             39.7       09-01    137  |  109<H>  |  24<H>  ----------------------------<  135<H>  4.9   |  23  |  1.23    Ca    8.5      01 Sep 2023 22:10    TPro  6.8  /  Alb  3.0<L>  /  TBili  0.4  /  DBili  x   /  AST  46<H>  /  ALT  32  /  AlkPhos  59  09-01    Urinalysis Basic - ( 01 Sep 2023 22:10 )    Color: x / Appearance: x / SG: x / pH: x  Gluc: 135 mg/dL / Ketone: x  / Bili: x / Urobili: x   Blood: x / Protein: x / Nitrite: x   Leuk Esterase: x / RBC: x / WBC x   Sq Epi: x / Non Sq Epi: x / Bacteria: x    DVT Prophylaxis:  Xarelto                                                               Advanced Directives: Full Code

## 2023-09-02 NOTE — CONSULT NOTE ADULT - PROBLEM SELECTOR RECOMMENDATION 9
likely diastolic heart failure with AFIB RVR / volume overload with IV fluid  , now in sinus rhythm , will give IV lasix , CXR , repeat Echo to assess ventricular function ,  continue plavix , xarelto ,  BB

## 2023-09-02 NOTE — CONSULT NOTE ADULT - PROBLEM SELECTOR RECOMMENDATION 3
likely demand related ischemia with AFIB with RVR with underlying CAD  ,  with recent coronary stent placement   continue BB , plavix ,statin , metoprolol

## 2023-09-02 NOTE — PROGRESS NOTE ADULT - SUBJECTIVE AND OBJECTIVE BOX
CC: 83 y/o Female with a PMHx of left shoulder pain, stress incontinence, eczema, low back pain, arthritis, glaucoma, chronic rhinitis presents to the ED with complain of palpitations and dyspnea on exertion. The patient was diagnosed with afib 3 weeks ago and placed on xarelto. Had stents placed 1 week ago with Dr. Toney and placed on plavix and metoprolol with improvement of exertional chest pressure. Patient started on have sudden onset of palpitations while having dinner last evening. She was feeling out of breath yesterday evening going up the stairs, also felt chest pressure and tremors. On last Wednesday night, had diarrhea, fever of 101 F, chills, and diaphoresis that subsided yesterday. No other complain now. Patient was found to be in A fib with RVR when she arrived in ED yesterday. She converted to NSR spontaneously    9/2 - patient was seen by me this morning, she was hypoxic at 430am - she denied cp, i did a CXR which showed pulm vasc congestion, gave lasix and she felt better, i decreased her O2 from 4L to 2L. she was in sinus and had no cp, she had breakfast.  2hrs later, she was very short of breath and feeling nauseous, i called an RRT - again no cp, in sinus on tele   EKG rev by me - no acute st/tw changes, was on NRM dced when she started to throw-up. Pt threw up twice during the RRT - was upgraded to CCU     Vital Signs Last 24 Hrs  T(C): 35.9 (02 Sep 2023 20:00), Max: 40.3 (02 Sep 2023 14:45)  T(F): 96.7 (02 Sep 2023 20:00), Max: 104.5 (02 Sep 2023 14:45)  HR: 71 (02 Sep 2023 21:45) (7 - 97)  BP: 99/56 (02 Sep 2023 21:45) (92/56 - 137/75)  BP(mean): 70 (02 Sep 2023 21:45) (67 - 88)  RR: 22 (02 Sep 2023 21:45) (13 - 32)  SpO2: 100% (02 Sep 2023 21:45) (93% - 100%)  Parameters below as of 02 Sep 2023 20:35  Patient On (Oxygen Delivery Method): nasal cannula  Constitutional: NAD, awake and alert  HEENT: PERR, EOMI  Neck: Soft and supple,  No JVD  Respiratory: crackles   Cardiovascular: S1 and S2, regular rate and rhythm, no Murmurs  Gastrointestinal: soft, nontender, nondistended  Extremities: No peripheral edema  Vascular: 2+ peripheral pulses  Neurological: A/O x 3, no focal deficits  Musculoskeletal: 5/5 strength b/l upper and lower extremities  Skin: No rashes    MEDICATIONS:  MEDICATIONS  (STANDING):  albuterol    0.083% 2.5 milliGRAM(s) Nebulizer every 6 hours  atorvastatin 20 milliGRAM(s) Oral at bedtime  LABS: All Labs Reviewed:                        13.2   8.89  )-----------( 358      ( 01 Sep 2023 22:10 )             39.7     09-02    137  |  109<H>  |  24<H>  ----------------------------<  170<H>  3.8   |  21<L>  |  1.14    Ca    8.5      02 Sep 2023 15:06  TPro  6.8  /  Alb  3.0<L>  /  TBili  0.4  /  DBili  x   /  AST  46<H>  /  ALT  32  /  AlkPhos  59  09-01  PT/INR - ( 01 Sep 2023 22:32 )   PT: 23.3 sec;   INR: 2.11 ratio    PTT - ( 01 Sep 2023 22:32 )  PTT:36.0 sec      MEDS  acetaminophen     Tablet .. 650 milliGRAM(s) Oral every 6 hours PRN  albuterol    0.083% 2.5 milliGRAM(s) Nebulizer every 6 hours  aluminum hydroxide/magnesium hydroxide/simethicone Suspension 30 milliLiter(s) Oral every 4 hours PRN  atorvastatin 20 milliGRAM(s) Oral at bedtime  clopidogrel Tablet 75 milliGRAM(s) Oral daily  dorzolamide 2%/timolol 0.5% Ophthalmic Solution 1 Drop(s) Both EYES two times a day  furosemide   Injectable 40 milliGRAM(s) IV Push every 12 hours  hydroxyurea 500 milliGRAM(s) Oral <User Schedule>  loratadine 10 milliGRAM(s) Oral daily  melatonin 3 milliGRAM(s) Oral at bedtime PRN  metoprolol tartrate 25 milliGRAM(s) Oral two times a day  ondansetron Injectable 4 milliGRAM(s) IV Push every 8 hours PRN  piperacillin/tazobactam IVPB.- 3.375 Gram(s) IV Intermittent once  rivaroxaban 15 milliGRAM(s) Oral with dinner      clopidogrel Tablet 75 milliGRAM(s) Oral daily  dorzolamide 2%/timolol 0.5% Ophthalmic Solution 1 Drop(s) Both EYES two times a day  furosemide   Injectable 40 milliGRAM(s) IV Push every 12 hours  hydroxyurea 500 milliGRAM(s) Oral <User Schedule>  loratadine 10 milliGRAM(s) Oral daily  metoprolol tartrate 25 milliGRAM(s) Oral two times a day  piperacillin/tazobactam IVPB.- 3.375 Gram(s) IV Intermittent once  rivaroxaban 15 milliGRAM(s) Oral with dinner        RADIOLOGY/EKG:

## 2023-09-02 NOTE — H&P ADULT - HISTORY OF PRESENT ILLNESS
81 y/o Female with a PMHx of left shoulder pain, stress incontinence, eczema, low back pain, arthritis, glaucoma, chronic rhinitis presents to the ED with complain of palpitations and dyspnea on exertion. The patient was diagnosed with afib 3 weeks ago and placed on xarelto. Had stents placed 1 week ago with Dr. Toney and placed on plavix and metroprolol with improvement of exertional chest pressure. Patient started on have sudden onset of palpitations while having dinner last evening. She was feeling out of breath yesterday evening going up the stairs, also felt chest pressure and tremors. On last Wednesday night, had diarrhea, fever of 101 F, chills, and diaphoresis that subsided yesterday. No other complain now. Patient was found to be in A fib with RVR when she arrived in ED yesterday. She converted to NSR spontaneously.

## 2023-09-02 NOTE — H&P ADULT - NSHPREVIEWOFSYSTEMS_GEN_ALL_CORE
Gen: No fever, chills, weakness  ENT: No visual changes or throat pain  Neck: No pain or stiffness  Respiratory: No cough or wheezing  Cardiovascular: ++ chest pain, ++palpitations  Gastrointestinal: No abdominal pain, nausea, vomiting, constipation, or diarrhea  Hematologic: No easy bleeding or bruising  Neurologic: No numbness or focal weakness  Psych: No depression or insomnia  Skin: No rash or itching See HPI

## 2023-09-02 NOTE — PROVIDER CONTACT NOTE (OTHER) - ACTION/TREATMENT ORDERED:
Nonrebreather placed w/ SPO2 increasing to %. Currently on 4L nc 90-94% w/ desat to 85-89% on occasion. IVF held, continuos pulse ox monitoring, Albuterol tx ordered q6. MD at bedside to assess

## 2023-09-02 NOTE — H&P ADULT - NSHPPHYSICALEXAM_GEN_ALL_CORE
T(C): 37.6 (09-02-23 @ 00:01), Max: 37.6 (09-02-23 @ 00:01)  HR: 85 (09-02-23 @ 00:01) (85 - 133)  BP: 100/53 (09-02-23 @ 00:01) (100/53 - 131/75)  RR: 24 (09-02-23 @ 00:01) (24 - 26)  SpO2: 98% (09-02-23 @ 00:01) (98% - 100%)    CONSTITUTIONAL: Well groomed, no apparent distress  EYES: PERRLA and symmetric, EOMI, No conjunctival or scleral injection, non-icteric  ENMT: Oral mucosa with moist membranes. Normal dentition; no pharyngeal injection or exudates             NECK: Supple, symmetric and without tracheal deviation   RESP: No respiratory distress, no use of accessory muscles; CTA b/l, no WRR  CV: RRR, +S1S2, no MRG; no JVD; no peripheral edema  GI: Soft, NT, ND, no rebound, no guarding; no palpable masses;   LYMPH: No cervical LAD or tenderness;   MSK: Normal ROM without pain,  normal muscle strength/tone  SKIN: No rashes or ulcers noted;   NEURO: CN II-XII intact; normal reflexes in upper and lower extremities, sensation intact in upper and lower extremities b/l to light touch   PSYCH: Appropriate insight/judgment; A+O x 3, mood and affect appropriate, recent/remote memory intact

## 2023-09-02 NOTE — PATIENT PROFILE ADULT - NSTRANSFERBELONGINGSRESP_GEN_A_NUR
TRANSFER - IN REPORT: 
 
Verbal report received from LON Bob RN(name) on [employer]  being received from ED(unit) for routine progression of care Report consisted of patients Situation, Background, Assessment and  
Recommendations(SBAR). Information from the following report(s) SBAR and Kardex was reviewed with the receiving nurse. Opportunity for questions and clarification was provided. Assessment completed upon patients arrival to unit and care assumed. yes

## 2023-09-02 NOTE — PROGRESS NOTE ADULT - SUBJECTIVE AND OBJECTIVE BOX
This Patient is an 82 year old female with PMH HTN, PAF (diagnosed 3 weeks ago), who  had a Left heart cath 1 week ago.  Cath showed 80% LAD lesion, 30%, Circumflex lesion,  ef was 55%. The patient was on Xarelto and Plavix.  Patient was admitted with shortness of breathe  and palpitations She was in Rapid afib.  Troponin peaked at 1600,   Patient today acute became sob, with hypoxia to 70s, EKG no acute changes, with bouts of emesis  Patient transferred to CCU  On arrival to CCU temp to 104  was given dose of Lasix.  cxr after transfer to ccu does not look like heart failure    Events last 24 hours: On abx. Patients offers no complaints this evening. Denies CP, SOB, abd pain, increased BM.    PAST MEDICAL & SURGICAL HISTORY:  Chronic rhinitis      Glaucoma (increased eye pressure)      Arthritis      Low back pain      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          Review of Systems:  Negative unless stated    Medications:  piperacillin/tazobactam IVPB.- 3.375 Gram(s) IV Intermittent once    furosemide   Injectable 40 milliGRAM(s) IV Push every 12 hours  metoprolol tartrate 25 milliGRAM(s) Oral two times a day    albuterol    0.083% 2.5 milliGRAM(s) Nebulizer every 6 hours  loratadine 10 milliGRAM(s) Oral daily    acetaminophen     Tablet .. 650 milliGRAM(s) Oral every 6 hours PRN  melatonin 3 milliGRAM(s) Oral at bedtime PRN  ondansetron Injectable 4 milliGRAM(s) IV Push every 8 hours PRN    hydroxyurea 500 milliGRAM(s) Oral <User Schedule>    clopidogrel Tablet 75 milliGRAM(s) Oral daily  rivaroxaban 15 milliGRAM(s) Oral with dinner    aluminum hydroxide/magnesium hydroxide/simethicone Suspension 30 milliLiter(s) Oral every 4 hours PRN      atorvastatin 20 milliGRAM(s) Oral at bedtime        dorzolamide 2%/timolol 0.5% Ophthalmic Solution 1 Drop(s) Both EYES two times a day            ICU Vital Signs Last 24 Hrs  T(C): 35.9 (02 Sep 2023 20:00), Max: 40.3 (02 Sep 2023 14:45)  T(F): 96.7 (02 Sep 2023 20:00), Max: 104.5 (02 Sep 2023 14:45)  HR: 71 (02 Sep 2023 21:45) (7 - 97)  BP: 99/56 (02 Sep 2023 21:45) (92/56 - 137/75)  BP(mean): 70 (02 Sep 2023 21:45) (67 - 88)  ABP: --  ABP(mean): --  RR: 22 (02 Sep 2023 21:45) (13 - 32)  SpO2: 100% (02 Sep 2023 21:45) (93% - 100%)    O2 Parameters below as of 02 Sep 2023 20:35  Patient On (Oxygen Delivery Method): nasal cannula                I&O's Detail    02 Sep 2023 07:01  -  02 Sep 2023 22:10  --------------------------------------------------------  IN:  Total IN: 0 mL    OUT:    Voided (mL): 1000 mL  Total OUT: 1000 mL    Total NET: -1000 mL            LABS:                        13.2   8.89  )-----------( 358      ( 01 Sep 2023 22:10 )             39.7     09-02    137  |  109<H>  |  24<H>  ----------------------------<  170<H>  3.8   |  21<L>  |  1.14    Ca    8.5      02 Sep 2023 15:06    TPro  6.8  /  Alb  3.0<L>  /  TBili  0.4  /  DBili  x   /  AST  46<H>  /  ALT  32  /  AlkPhos  59  09-01          CAPILLARY BLOOD GLUCOSE      POCT Blood Glucose.: 109 mg/dL (02 Sep 2023 14:00)    PT/INR - ( 01 Sep 2023 22:32 )   PT: 23.3 sec;   INR: 2.11 ratio         PTT - ( 01 Sep 2023 22:32 )  PTT:36.0 sec  Urinalysis Basic - ( 02 Sep 2023 16:30 )    Color: Yellow / Appearance: Clear / S.010 / pH: x  Gluc: x / Ketone: Negative  / Bili: Negative / Urobili: Negative   Blood: x / Protein: Negative / Nitrite: Negative   Leuk Esterase: Negative / RBC: x / WBC x   Sq Epi: x / Non Sq Epi: x / Bacteria: x      CULTURES:  Rapid RVP Result: NotDetec ( @ 22:11)      Physical Examination:    General: No acute distress.      HEENT: Pupils equal, reactive to light.  Symmetric.    PULM: Clear to auscultation bilaterally, no significant sputum production    NECK: Supple, no lymphadenopathy, trachea midline    CVS: Regular rate and rhythm, no murmurs, rubs, or gallops    ABD: Soft, nondistended, nontender, normoactive bowel sounds, no masses    EXT: No edema, nontender    SKIN: Warm and well perfused, no rashes noted.    NEURO: Alert, oriented, interactive, nonfocal    < from: CT Abdomen and Pelvis w/ IV Cont (23 @ 18:22) >  IMPRESSION:  *  No acute septic pathology.  *  No drainable collection.  *  Diffuse nonspecific gallbladder wall edema. Correlate for acute   cholecystitis.      --- End ofReport ---            JANESSA DIOP MD; Attending Radiologist  This document has been electronically signed. Sep  2 2023  7:28PM    < end of copied text >

## 2023-09-02 NOTE — H&P ADULT - ASSESSMENT
A/P:    1.  Chest  pain  Elevated troponin  h/o CAD s/p 1 stent 1 week ago  -no active chest pain now  -follow repeat troponin  -unlikely to be Acute Coronary syndrome  -continue Asprin, plavix, statin  -follow Echo    2.  A fib with RVR  -NSR now  -continue Metoprolol and Xarelto    3.  Xarelto for DVT ppx    4.  Code status: Full code.  A/P:    1.  Chest  pain  Elevated troponin  h/o CAD s/p 1 stent 1 week ago  -no active chest pain now  -elevated troponin may be from prior recent procedure   -follow repeat troponin  -unlikely to be Acute Coronary syndrome  -continue Asprin, plavix, statin  -follow Echo    2.  A fib with RVR  -NSR now  -continue Metoprolol and Xarelto    3.  Xarelto for DVT ppx    4.  Code status: Full code.

## 2023-09-02 NOTE — PROGRESS NOTE ADULT - ASSESSMENT
82 year old female with PMH HTN, PAF (diagnosed 3 weeks ago), who  had a Left heart cath 1 week ago presents for SOB and PASCAL.     Assessment     1) NSTEMI  2) Afib w/RVR        Plan    Patient transferred to ICU after acutely developing rigors, SOB and fever.   Sepsis work up initiated: Negative UA and lactate  Pending repeat CBC, LFTs, BMP  CT showed no acute septic pathology but showed diffuse gallbladder edema. No fat stranding or fluid collections noted.    Clinical exam not supportive of cholecystis  Will obtain labs, trend symptomatology and fever curve  RUQ US if patient spikes fevers  Cardiology following for NSTEMI  Continue Metoprolol, statin, plavix and Xarelto   ECHO showed EF 65%     Time spent on this patient encounter, which includes documenting this note in the electronic medical record, was 58 minutes including assessing the presenting problems with associated risks, reviewing the medical record to prepare for the encounter, and meeting face to face with patient to obtain additional history. I have also performed an appropriate physical exam, made interventions listed and ordered and interpreted appropriate diagnostic studies as documented. To improve communication and patient safety I have coordinated care with the multidisciplinary team including the bedside nurse, appropriate attending of record and consultants as needed. "

## 2023-09-03 DIAGNOSIS — R50.9 FEVER, UNSPECIFIED: ICD-10-CM

## 2023-09-03 DIAGNOSIS — R06.02 SHORTNESS OF BREATH: ICD-10-CM

## 2023-09-03 DIAGNOSIS — R77.8 OTHER SPECIFIED ABNORMALITIES OF PLASMA PROTEINS: ICD-10-CM

## 2023-09-03 LAB
ALBUMIN SERPL ELPH-MCNC: 2.6 G/DL — LOW (ref 3.3–5)
ALBUMIN SERPL ELPH-MCNC: 3.2 G/DL — LOW (ref 3.3–5)
ALP SERPL-CCNC: 71 U/L — SIGNIFICANT CHANGE UP (ref 40–120)
ALP SERPL-CCNC: 86 U/L — SIGNIFICANT CHANGE UP (ref 40–120)
ALT FLD-CCNC: 51 U/L — SIGNIFICANT CHANGE UP (ref 12–78)
ALT FLD-CCNC: 63 U/L — SIGNIFICANT CHANGE UP (ref 12–78)
ANION GAP SERPL CALC-SCNC: 4 MMOL/L — LOW (ref 5–17)
ANION GAP SERPL CALC-SCNC: 5 MMOL/L — SIGNIFICANT CHANGE UP (ref 5–17)
AST SERPL-CCNC: 42 U/L — HIGH (ref 15–37)
AST SERPL-CCNC: 52 U/L — HIGH (ref 15–37)
BILIRUB DIRECT SERPL-MCNC: <0.1 MG/DL — SIGNIFICANT CHANGE UP (ref 0–0.3)
BILIRUB INDIRECT FLD-MCNC: >0.1 MG/DL — LOW (ref 0.2–1)
BILIRUB SERPL-MCNC: 0.2 MG/DL — SIGNIFICANT CHANGE UP (ref 0.2–1.2)
BILIRUB SERPL-MCNC: 0.4 MG/DL — SIGNIFICANT CHANGE UP (ref 0.2–1.2)
BUN SERPL-MCNC: 22 MG/DL — SIGNIFICANT CHANGE UP (ref 7–23)
BUN SERPL-MCNC: 24 MG/DL — HIGH (ref 7–23)
CALCIUM SERPL-MCNC: 8.5 MG/DL — SIGNIFICANT CHANGE UP (ref 8.5–10.1)
CALCIUM SERPL-MCNC: 8.8 MG/DL — SIGNIFICANT CHANGE UP (ref 8.5–10.1)
CHLORIDE SERPL-SCNC: 111 MMOL/L — HIGH (ref 96–108)
CHLORIDE SERPL-SCNC: 111 MMOL/L — HIGH (ref 96–108)
CO2 SERPL-SCNC: 25 MMOL/L — SIGNIFICANT CHANGE UP (ref 22–31)
CO2 SERPL-SCNC: 25 MMOL/L — SIGNIFICANT CHANGE UP (ref 22–31)
CREAT SERPL-MCNC: 1.18 MG/DL — SIGNIFICANT CHANGE UP (ref 0.5–1.3)
CREAT SERPL-MCNC: 1.18 MG/DL — SIGNIFICANT CHANGE UP (ref 0.5–1.3)
EGFR: 46 ML/MIN/1.73M2 — LOW
EGFR: 46 ML/MIN/1.73M2 — LOW
GLUCOSE SERPL-MCNC: 107 MG/DL — HIGH (ref 70–99)
GLUCOSE SERPL-MCNC: 227 MG/DL — HIGH (ref 70–99)
HCT VFR BLD CALC: 34.1 % — LOW (ref 34.5–45)
HCT VFR BLD CALC: 42.4 % — SIGNIFICANT CHANGE UP (ref 34.5–45)
HGB BLD-MCNC: 11.5 G/DL — SIGNIFICANT CHANGE UP (ref 11.5–15.5)
HGB BLD-MCNC: 13.9 G/DL — SIGNIFICANT CHANGE UP (ref 11.5–15.5)
MAGNESIUM SERPL-MCNC: 2.2 MG/DL — SIGNIFICANT CHANGE UP (ref 1.6–2.6)
MAGNESIUM SERPL-MCNC: 2.2 MG/DL — SIGNIFICANT CHANGE UP (ref 1.6–2.6)
MCHC RBC-ENTMCNC: 30.3 PG — SIGNIFICANT CHANGE UP (ref 27–34)
MCHC RBC-ENTMCNC: 30.6 PG — SIGNIFICANT CHANGE UP (ref 27–34)
MCHC RBC-ENTMCNC: 32.8 GM/DL — SIGNIFICANT CHANGE UP (ref 32–36)
MCHC RBC-ENTMCNC: 33.7 GM/DL — SIGNIFICANT CHANGE UP (ref 32–36)
MCV RBC AUTO: 90.7 FL — SIGNIFICANT CHANGE UP (ref 80–100)
MCV RBC AUTO: 92.4 FL — SIGNIFICANT CHANGE UP (ref 80–100)
PHOSPHATE SERPL-MCNC: 3.7 MG/DL — SIGNIFICANT CHANGE UP (ref 2.5–4.5)
PLATELET # BLD AUTO: 287 K/UL — SIGNIFICANT CHANGE UP (ref 150–400)
PLATELET # BLD AUTO: 361 K/UL — SIGNIFICANT CHANGE UP (ref 150–400)
POTASSIUM SERPL-MCNC: 3.3 MMOL/L — LOW (ref 3.5–5.3)
POTASSIUM SERPL-MCNC: 4.4 MMOL/L — SIGNIFICANT CHANGE UP (ref 3.5–5.3)
POTASSIUM SERPL-SCNC: 3.3 MMOL/L — LOW (ref 3.5–5.3)
POTASSIUM SERPL-SCNC: 4.4 MMOL/L — SIGNIFICANT CHANGE UP (ref 3.5–5.3)
PROT SERPL-MCNC: 5.8 GM/DL — LOW (ref 6–8.3)
PROT SERPL-MCNC: 7.1 GM/DL — SIGNIFICANT CHANGE UP (ref 6–8.3)
RBC # BLD: 3.76 M/UL — LOW (ref 3.8–5.2)
RBC # BLD: 4.59 M/UL — SIGNIFICANT CHANGE UP (ref 3.8–5.2)
RBC # FLD: 15.9 % — HIGH (ref 10.3–14.5)
RBC # FLD: 16.2 % — HIGH (ref 10.3–14.5)
SODIUM SERPL-SCNC: 140 MMOL/L — SIGNIFICANT CHANGE UP (ref 135–145)
SODIUM SERPL-SCNC: 141 MMOL/L — SIGNIFICANT CHANGE UP (ref 135–145)
TROPONIN I, HIGH SENSITIVITY RESULT: 655.63 NG/L — HIGH
WBC # BLD: 5.43 K/UL — SIGNIFICANT CHANGE UP (ref 3.8–10.5)
WBC # BLD: 8.83 K/UL — SIGNIFICANT CHANGE UP (ref 3.8–10.5)
WBC # FLD AUTO: 5.43 K/UL — SIGNIFICANT CHANGE UP (ref 3.8–10.5)
WBC # FLD AUTO: 8.83 K/UL — SIGNIFICANT CHANGE UP (ref 3.8–10.5)

## 2023-09-03 PROCEDURE — 99233 SBSQ HOSP IP/OBS HIGH 50: CPT

## 2023-09-03 RX ORDER — DORZOLAMIDE HYDROCHLORIDE TIMOLOL MALEATE 20; 5 MG/ML; MG/ML
1 SOLUTION/ DROPS OPHTHALMIC
Refills: 0 | DISCHARGE

## 2023-09-03 RX ORDER — LORATADINE 10 MG/1
10 TABLET ORAL DAILY
Refills: 0 | Status: DISCONTINUED | OUTPATIENT
Start: 2023-09-03 | End: 2023-09-15

## 2023-09-03 RX ORDER — POTASSIUM CHLORIDE 20 MEQ
40 PACKET (EA) ORAL EVERY 6 HOURS
Refills: 0 | Status: COMPLETED | OUTPATIENT
Start: 2023-09-03 | End: 2023-09-03

## 2023-09-03 RX ORDER — ATORVASTATIN CALCIUM 80 MG/1
1 TABLET, FILM COATED ORAL
Refills: 0 | DISCHARGE

## 2023-09-03 RX ORDER — SODIUM CHLORIDE 9 MG/ML
1000 INJECTION, SOLUTION INTRAVENOUS
Refills: 0 | Status: DISCONTINUED | OUTPATIENT
Start: 2023-09-03 | End: 2023-09-05

## 2023-09-03 RX ORDER — CHLORHEXIDINE GLUCONATE 213 G/1000ML
1 SOLUTION TOPICAL
Refills: 0 | Status: DISCONTINUED | OUTPATIENT
Start: 2023-09-03 | End: 2023-09-15

## 2023-09-03 RX ADMIN — Medication 40 MILLIGRAM(S): at 05:03

## 2023-09-03 RX ADMIN — ALBUTEROL 2.5 MILLIGRAM(S): 90 AEROSOL, METERED ORAL at 04:44

## 2023-09-03 RX ADMIN — SODIUM CHLORIDE 75 MILLILITER(S): 9 INJECTION, SOLUTION INTRAVENOUS at 11:45

## 2023-09-03 RX ADMIN — CLOPIDOGREL BISULFATE 75 MILLIGRAM(S): 75 TABLET, FILM COATED ORAL at 10:57

## 2023-09-03 RX ADMIN — Medication 25 MILLIGRAM(S): at 10:57

## 2023-09-03 RX ADMIN — DORZOLAMIDE HYDROCHLORIDE TIMOLOL MALEATE 1 DROP(S): 20; 5 SOLUTION/ DROPS OPHTHALMIC at 11:37

## 2023-09-03 RX ADMIN — PIPERACILLIN AND TAZOBACTAM 25 GRAM(S): 4; .5 INJECTION, POWDER, LYOPHILIZED, FOR SOLUTION INTRAVENOUS at 16:50

## 2023-09-03 RX ADMIN — ALBUTEROL 2.5 MILLIGRAM(S): 90 AEROSOL, METERED ORAL at 08:51

## 2023-09-03 RX ADMIN — ATORVASTATIN CALCIUM 20 MILLIGRAM(S): 80 TABLET, FILM COATED ORAL at 21:41

## 2023-09-03 RX ADMIN — Medication 650 MILLIGRAM(S): at 05:08

## 2023-09-03 RX ADMIN — Medication 40 MILLIEQUIVALENT(S): at 05:03

## 2023-09-03 RX ADMIN — LORATADINE 10 MILLIGRAM(S): 10 TABLET ORAL at 15:54

## 2023-09-03 RX ADMIN — RIVAROXABAN 15 MILLIGRAM(S): KIT at 18:00

## 2023-09-03 RX ADMIN — PIPERACILLIN AND TAZOBACTAM 25 GRAM(S): 4; .5 INJECTION, POWDER, LYOPHILIZED, FOR SOLUTION INTRAVENOUS at 06:52

## 2023-09-03 RX ADMIN — Medication 25 MILLIGRAM(S): at 21:42

## 2023-09-03 RX ADMIN — DORZOLAMIDE HYDROCHLORIDE TIMOLOL MALEATE 1 DROP(S): 20; 5 SOLUTION/ DROPS OPHTHALMIC at 21:42

## 2023-09-03 RX ADMIN — Medication 40 MILLIEQUIVALENT(S): at 00:58

## 2023-09-03 RX ADMIN — SODIUM CHLORIDE 75 MILLILITER(S): 9 INJECTION, SOLUTION INTRAVENOUS at 11:37

## 2023-09-03 RX ADMIN — PIPERACILLIN AND TAZOBACTAM 25 GRAM(S): 4; .5 INJECTION, POWDER, LYOPHILIZED, FOR SOLUTION INTRAVENOUS at 22:05

## 2023-09-03 NOTE — PROGRESS NOTE ADULT - SUBJECTIVE AND OBJECTIVE BOX
CHIEF COMPLAINT: shortness of breath , some epigastric burning sensation on exertion yesterday evening     HPI:  81 y/o Female with a PMHx of left shoulder pain, stress incontinence, eczema, low back pain, arthritis, glaucoma, chronic rhinitis presents to the ED with complain of palpitations and dyspnea on exertion started yesterday evening  The patient was diagnosed with afib 3 weeks ago and placed on xarelto  , had subsequent cardiac cath for exertional chest tightness , noted LAD disease which was stented last week  , with improvement in symptoms  , patient was not feeling well for last couple of days with chills , fever , loose BM , got better  yesterday  without cough  Patient was in afib yesterday during his exam with her cardiologist , went home , started noticing shortness of breath , on taking different from prior to her stent , decided to come to ER where she was noted to have afib with rapid rate , patient did recieve IVF in ER and as well she drinking salty soups , eating salty crackers at home ,  patient heart rate improved in ER , developed carmela night SOB with desaturation last night , without chest pain , was placed on oxygen  , Patient Blood work showed elevated troponin      9/3/23 Events noted , patient developed shortness of breath with hypoxia  with high febrile episode ,  was having nausea ,  some back pain , temp 104  symptoms improved over some time , no recurrence of SOB while in CCU   CT chest did not show CHF or pneumonia            PAST MEDICAL & SURGICAL HISTORY:  Chronic rhinitis      Glaucoma (increased eye pressure)      Arthritis      Low back pain      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          Allergies    No Known Allergies    Intolerances        SOCIAL HISTORY:    Social History:  Lives at home.  Non smoker.  Non alcoholic. (02 Sep 2023 00:42)      FAMILY HISTORY:  Family history of prostate cancer in father (Father)    Family history of glioblastoma (Mother, Sibling)    Family history of coronary artery disease (Sibling)    Family history of diabetes mellitus (DM) (Sibling)        MEDICATIONS:Home Medications:  atorvastatin 20 mg oral tablet: 1 tab(s) orally once a day (02 Sep 2023 00:12)  dorzolamide-timolol 2.23%-0.68% (2%-0.5% base) ophthalmic solution: 1 drop(s) in each eye 2 times a day (02 Sep 2023 00:12)  hydroxyurea 500 mg oral capsule: 500 milligram(s) orally once a day take on M-W-F (02 Sep 2023 00:12)  loratadine 10 mg oral tablet: 1 tab(s) orally once a day (at bedtime) (02 Sep 2023 00:12)  metoprolol tartrate 25 mg oral tablet: 1 tab(s) orally 2 times a day (02 Sep 2023 00:12)  Xarelto 20 mg oral tablet: 1 tab(s) orally once a day (at bedtime) (02 Sep 2023 00:12)    MEDICATIONS  (STANDING):  albuterol    0.083% 2.5 milliGRAM(s) Nebulizer every 6 hours  atorvastatin 20 milliGRAM(s) Oral at bedtime  clopidogrel Tablet 75 milliGRAM(s) Oral daily  dorzolamide 2%/timolol 0.5% Ophthalmic Solution 1 Drop(s) Both EYES two times a day  furosemide   Injectable 40 milliGRAM(s) IV Push every 12 hours  hydroxyurea 500 milliGRAM(s) Oral <User Schedule>  loratadine 10 milliGRAM(s) Oral daily  metoprolol tartrate 25 milliGRAM(s) Oral two times a day  piperacillin/tazobactam IVPB.- 3.375 Gram(s) IV Intermittent once  piperacillin/tazobactam IVPB.. 3.375 Gram(s) IV Intermittent every 8 hours  rivaroxaban 15 milliGRAM(s) Oral with dinner    MEDICATIONS  (PRN):  acetaminophen     Tablet .. 650 milliGRAM(s) Oral every 6 hours PRN Temp greater or equal to 38C (100.4F), Mild Pain (1 - 3)  aluminum hydroxide/magnesium hydroxide/simethicone Suspension 30 milliLiter(s) Oral every 4 hours PRN Dyspepsia  melatonin 3 milliGRAM(s) Oral at bedtime PRN Insomnia  ondansetron Injectable 4 milliGRAM(s) IV Push every 8 hours PRN Nausea and/or Vomiting    Vital Signs Last 24 Hrs  T(C): 36.8 (03 Sep 2023 04:00), Max: 40.3 (02 Sep 2023 14:45)  T(F): 98.2 (03 Sep 2023 04:00), Max: 104.5 (02 Sep 2023 14:45)  HR: 87 (03 Sep 2023 07:00) (52 - 94)  BP: 87/51 (03 Sep 2023 07:00) (87/51 - 155/88)  BP(mean): 63 (03 Sep 2023 07:00) (63 - 102)  RR: 24 (03 Sep 2023 07:00) (13 - 34)  SpO2: 94% (03 Sep 2023 07:00) (91% - 100%)    Parameters below as of 03 Sep 2023 06:00  Patient On (Oxygen Delivery Method): nasal cannula  O2 Flow (L/min): 5      I&O's Summary    02 Sep 2023 07:01  -  03 Sep 2023 07:00  --------------------------------------------------------  IN: 860 mL / OUT: 2050 mL / NET: -1190 mL        PHYSICAL EXAM:    Constitutional: NAD, awake and alert, well-developed  HEENT: PERR, EOMI,  No oral cyananosis.  Neck:  supple,  No JVD  Respiratory: Breath sounds equal minimal scattered wheeze , scanty basal crackles   Cardiovascular: S1 and S2, regular rate and rhythm, no Murmurs, gallops or rubs  Gastrointestinal: Bowel Sounds present, soft, nontender.   Extremities: No peripheral edema. No clubbing or cyanosis.  Vascular: 2+ peripheral pulses  Neurological: A/O x 3, no focal deficits  Musculoskeletal: no calf tenderness.  Skin: No rashes.      LABS: All Labs Reviewed:                                      13.9   8.83  )-----------( 361      ( 03 Sep 2023 05:23 )             42.4     09-03    140  |  111<H>  |  22  ----------------------------<  107<H>  4.4   |  25  |  1.18    Ca    8.8      03 Sep 2023 05:23  Phos  3.7     09-02  Mg     2.2     09-03    TPro  7.1  /  Alb  3.2<L>  /  TBili  0.4  /  DBili  x   /  AST  52<H>  /  ALT  63  /  AlkPhos  86  09-03        LIVER FUNCTIONS - ( 03 Sep 2023 05:23 )  Alb: 3.2 g/dL / Pro: 7.1 gm/dL / ALK PHOS: 86 U/L / ALT: 63 U/L / AST: 52 U/L / GGT: x           PT/INR - ( 01 Sep 2023 22:32 )   PT: 23.3 sec;   INR: 2.11 ratio         PTT - ( 01 Sep 2023 22:32 )  PTT:36.0 sec  Urinalysis Basic - ( 03 Sep 2023 05:23 )    Color: x / Appearance: x / SG: x / pH: x  Gluc: 107 mg/dL / Ketone: x  / Bili: x / Urobili: x   Blood: x / Protein: x / Nitrite: x   Leuk Esterase: x / RBC: x / WBC x   Sq Epi: x / Non Sq Epi: x / Bacteria: x        Culture Results:   No growth at 24 hours (09-01-23 @ 22:32)  Culture Results:   No growth at 24 hours (09-01-23 @ 22:10)    - TroponinI hsT: <-655.63, <-1045.45, <-1472.44, <-1832.57, <-1659.38  - TroponinI hsT: <-1472.44, <-1832.57, <-1659.38    RADIOLOGY/EKG:  e< from: 12 Lead ECG (09.01.23 @ 20:30) >  Atrial flutter with variable A-V block  ST & T wave abnormality, consider inferior ischemia  ST & T wave abnormality, consider anterior ischemia  Abnormal ECG  Confirmed by FREDERICK VU MD ([id number]) on 9/2/2023 9:57:39 AM  - TroponinI hsT: <-1472.44, <-1832.57, <-1659.38  < end of copied text >  < from: 12 Lead ECG (09.01.23 @ 23:27) >  Normal sinus rhythm  Normal ECG  Confirmed by FREDERICK VU MD ([id number]) on 9/2/2023 9:59:05 AM    < end of copied text >  < from: Cardiac Catheterization (08.23.23 @ 15:35) >  oronary Angiography   The coronary circulation is right dominant.      LM   Left main artery: Angiography shows mild atherosclerosis.      LAD   Proximal left anterior descending: There is an 80 % stenosis.   NELSON was placed     CX   Proximal circumflex: There is a 30 % stenosis.      RCA   Right coronary artery: Angiography shows mild atherosclerosis.      Left Heart Cath   Left ventricular function was assessed. Ejection fraction was visually  estimated by estimation. LV to AO pullback was  performed. LHC performed: Aortic valve crossed and left ventricular  pressure obtained.    < end of copied text >    < from: CT Chest w/ IV Cont (09.02.23 @ 18:22) >    IMPRESSION:  *  No acute septic pathology.  *  No drainable collection.  *  Diffuse nonspecific gallbladder wall edema. Correlate for acute   cholecystitis.        < end of copied text >  < from: CT Chest w/ IV Cont (09.02.23 @ 18:22) >  LUNGS AND LARGE AIRWAYS: Thecentral airways are patent. Bibasilar   atelectasis.  PLEURA: Small bilateral pleural effusions.  VESSELS: Normal caliber aorta. Main pulmonary arteries are patent.  HEART: No cardiomegaly. No pericardial effusion. Coronary artery   calcifications are present.    < end of copied text >  < from: TTE Echo Complete w/o Contrast w/ Doppler (09.02.23 @ 08:38) >     There is calcification of anterior mitral valve leaflet. The leaflet   opening is normal.   Mild mitral annular calcification is present.   Mild to Moderate mitral regurgitation is present.   EA reversal of the mitral inflow consistent with reduced compliance of   the   left ventricle.   The aortic valve is well visualized, appears mildly calcified. Valve   opening seems to be normal.   Normal appearing tricuspid valve structure.   Moderate (2+) tricuspid valve regurgitation is present.   Moderate pulmonary hypertension.   Normal appearing pulmonic valve structure.   Mild pulmonic valvular regurgitation (1+) is present.   The left atriumis mildly dilated.   Estimated left ventricular ejection fraction is 60-65 %.   The left ventricle is normal in size, wall motion and contractility as   seen in limited views.   Mild concentric left ventricular hypertrophy is present.   The right atrium appears borderline mildly dilated.   Normal appearing right ventricle structure and function.   IVC is dilated and not collapsing with inspiration.    < end of copied text >    < from: 12 Lead ECG (09.02.23 @ 14:19) >  Normal sinus rhythm  Normal ECG  Confirmed by FREDERICK VU MD ([id number]) on 9/2/2023 5:01:23 PM    < end of copied text >

## 2023-09-03 NOTE — PROGRESS NOTE ADULT - PROBLEM SELECTOR PLAN 1
recurrent episode  predominantly likely from acute febrile illness episode ,  may be component of afib with rapid rate on presentation , however recurrence shortness of breath with high fever  , ( patient did receive IV lasix prior to have SOB and fever yesterday )no obvious evidence of pneumonia   ?sepsis related SOB  , echo showed normal LVEF , moderate TR moderate pulmonary hypertension ,  trending down troponin , remain in sinus rhythm     follow up blood cultures , continue IV antibiotics , check BNP  , continue    patient essential thrombocytosis on medication

## 2023-09-03 NOTE — PROGRESS NOTE ADULT - PROBLEM SELECTOR PLAN 2
Patient was having episodes of chills with loose BM   for last few days prior to hospitalization , noted to have high fever yesterday while having dyspnea with hypoxia without obvious vascular congestion on CT chest , no EKG changes , down trending troponin ,

## 2023-09-03 NOTE — PROGRESS NOTE ADULT - ASSESSMENT
Plan:     Sepsis? On zosyn, f/u BCx  NSR  Xarelto, plavix   Not on aspirin during recent discharge   TTE with normal EF, mod pulm HTN   HD, resp, neuro stable   OOB  81 y/o female with recent hospitalization for CAD, underwent LAD PCI, paroxysmal A.fib, thrombocythemia on hydroxyurea     Presented with palpitations and PASCAL at home   Also with fever, diarrhea     She was initially admitted to the floor, had an episode of A.fib with RVR, high fever of 104.5, transferred to CCU 9/2     Temps are normal now and she is in NSR     Feels better, denies any complaints at this time       Plan:     A.fib with RVR-   In NSR now, HR is controlled   Continue metoprolol   AC with Xarelto     Suspected sepsis (POA), unclear source  High fever, HD instability   CT c/a/p reviewed - bibasilar atelectasis, thickened GB wall with edema  Will get HIDA scan (nonurgent), abd exam benign, WBC normal  Continue empiric zosyn for now   F/u Cx   Order IVF as appears dry, BP low normal  D/c Lasix     CAD, recent PCI-  Continue Plavix   Was not discharge on aspirin as she was on Xarelto   Continue metoprolol, Lipitor   Recent TTE with EF 60-65, mod pulm HTN     OOB     DVT ppx with Xarelto       Keep in CCU today

## 2023-09-03 NOTE — PROGRESS NOTE ADULT - SUBJECTIVE AND OBJECTIVE BOX
Patient is a 82y old  Female who presents with a chief complaint of Palpitation, Elevated troponin, A fib (03 Sep 2023 07:57)    24 hour events:     Allergies    No Known Allergies    Intolerances      REVIEW OF SYSTEMS: SEE BELOW       ICU Vital Signs Last 24 Hrs  T(C): 36.8 (03 Sep 2023 04:00), Max: 40.3 (02 Sep 2023 14:45)  T(F): 98.2 (03 Sep 2023 04:00), Max: 104.5 (02 Sep 2023 14:45)  HR: 87 (03 Sep 2023 07:00) (52 - 94)  BP: 87/51 (03 Sep 2023 07:00) (87/51 - 155/88)  BP(mean): 63 (03 Sep 2023 07:00) (63 - 102)  ABP: --  ABP(mean): --  RR: 24 (03 Sep 2023 07:00) (13 - 34)  SpO2: 94% (03 Sep 2023 07:00) (91% - 100%)    O2 Parameters below as of 03 Sep 2023 06:00  Patient On (Oxygen Delivery Method): nasal cannula  O2 Flow (L/min): 5          CAPILLARY BLOOD GLUCOSE      POCT Blood Glucose.: 109 mg/dL (02 Sep 2023 14:00)      I&O's Summary    02 Sep 2023 07:01  -  03 Sep 2023 07:00  --------------------------------------------------------  IN: 860 mL / OUT: 2050 mL / NET: -1190 mL            MEDICATIONS  (STANDING):  albuterol    0.083% 2.5 milliGRAM(s) Nebulizer every 6 hours  atorvastatin 20 milliGRAM(s) Oral at bedtime  clopidogrel Tablet 75 milliGRAM(s) Oral daily  dorzolamide 2%/timolol 0.5% Ophthalmic Solution 1 Drop(s) Both EYES two times a day  furosemide   Injectable 40 milliGRAM(s) IV Push every 12 hours  hydroxyurea 500 milliGRAM(s) Oral <User Schedule>  loratadine 10 milliGRAM(s) Oral daily  metoprolol tartrate 25 milliGRAM(s) Oral two times a day  piperacillin/tazobactam IVPB.- 3.375 Gram(s) IV Intermittent once  piperacillin/tazobactam IVPB.. 3.375 Gram(s) IV Intermittent every 8 hours  rivaroxaban 15 milliGRAM(s) Oral with dinner      MEDICATIONS  (PRN):  acetaminophen     Tablet .. 650 milliGRAM(s) Oral every 6 hours PRN Temp greater or equal to 38C (100.4F), Mild Pain (1 - 3)  aluminum hydroxide/magnesium hydroxide/simethicone Suspension 30 milliLiter(s) Oral every 4 hours PRN Dyspepsia  melatonin 3 milliGRAM(s) Oral at bedtime PRN Insomnia  ondansetron Injectable 4 milliGRAM(s) IV Push every 8 hours PRN Nausea and/or Vomiting      PHYSICAL EXAM: SEE BELOW                          13.9   8.83  )-----------( 361      ( 03 Sep 2023 05:23 )             42.4       09-03    140  |  111<H>  |  22  ----------------------------<  107<H>  4.4   |  25  |  1.18    Ca    8.8      03 Sep 2023 05:23  Phos  3.7     09-02  Mg     2.2     09-03    TPro  7.1  /  Alb  3.2<L>  /  TBili  0.4  /  DBili  x   /  AST  52<H>  /  ALT  63  /  AlkPhos  86  09-03    Lactate 2.0           09-02 @ 15:06          PT/INR - ( 01 Sep 2023 22:32 )   PT: 23.3 sec;   INR: 2.11 ratio         PTT - ( 01 Sep 2023 22:32 )  PTT:36.0 sec  Urinalysis Basic - ( 03 Sep 2023 05:23 )    Color: x / Appearance: x / SG: x / pH: x  Gluc: 107 mg/dL / Ketone: x  / Bili: x / Urobili: x   Blood: x / Protein: x / Nitrite: x   Leuk Esterase: x / RBC: x / WBC x   Sq Epi: x / Non Sq Epi: x / Bacteria: x      .Blood None   No growth at 24 hours -- 09-01 @ 22:32  .Blood None   No growth at 24 hours -- 09-01 @ 22:10      Rapid RVP Result: NotDetec (09-01 @ 22:11)

## 2023-09-04 LAB
ANION GAP SERPL CALC-SCNC: 6 MMOL/L — SIGNIFICANT CHANGE UP (ref 5–17)
BUN SERPL-MCNC: 23 MG/DL — SIGNIFICANT CHANGE UP (ref 7–23)
CALCIUM SERPL-MCNC: 8.3 MG/DL — LOW (ref 8.5–10.1)
CHLORIDE SERPL-SCNC: 110 MMOL/L — HIGH (ref 96–108)
CO2 SERPL-SCNC: 25 MMOL/L — SIGNIFICANT CHANGE UP (ref 22–31)
CREAT SERPL-MCNC: 0.96 MG/DL — SIGNIFICANT CHANGE UP (ref 0.5–1.3)
EGFR: 59 ML/MIN/1.73M2 — LOW
GLUCOSE SERPL-MCNC: 138 MG/DL — HIGH (ref 70–99)
HCT VFR BLD CALC: 32.2 % — LOW (ref 34.5–45)
HGB BLD-MCNC: 11 G/DL — LOW (ref 11.5–15.5)
MAGNESIUM SERPL-MCNC: 2 MG/DL — SIGNIFICANT CHANGE UP (ref 1.6–2.6)
MCHC RBC-ENTMCNC: 30.6 PG — SIGNIFICANT CHANGE UP (ref 27–34)
MCHC RBC-ENTMCNC: 34.2 GM/DL — SIGNIFICANT CHANGE UP (ref 32–36)
MCV RBC AUTO: 89.4 FL — SIGNIFICANT CHANGE UP (ref 80–100)
PHOSPHATE SERPL-MCNC: 3.5 MG/DL — SIGNIFICANT CHANGE UP (ref 2.5–4.5)
PLATELET # BLD AUTO: 299 K/UL — SIGNIFICANT CHANGE UP (ref 150–400)
POTASSIUM SERPL-MCNC: 3.6 MMOL/L — SIGNIFICANT CHANGE UP (ref 3.5–5.3)
POTASSIUM SERPL-SCNC: 3.6 MMOL/L — SIGNIFICANT CHANGE UP (ref 3.5–5.3)
RBC # BLD: 3.6 M/UL — LOW (ref 3.8–5.2)
RBC # FLD: 15.8 % — HIGH (ref 10.3–14.5)
SODIUM SERPL-SCNC: 141 MMOL/L — SIGNIFICANT CHANGE UP (ref 135–145)
WBC # BLD: 8.47 K/UL — SIGNIFICANT CHANGE UP (ref 3.8–10.5)
WBC # FLD AUTO: 8.47 K/UL — SIGNIFICANT CHANGE UP (ref 3.8–10.5)

## 2023-09-04 PROCEDURE — 99233 SBSQ HOSP IP/OBS HIGH 50: CPT

## 2023-09-04 RX ADMIN — HYDROXYUREA 500 MILLIGRAM(S): 500 CAPSULE ORAL at 12:18

## 2023-09-04 RX ADMIN — SODIUM CHLORIDE 75 MILLILITER(S): 9 INJECTION, SOLUTION INTRAVENOUS at 01:24

## 2023-09-04 RX ADMIN — Medication 650 MILLIGRAM(S): at 00:33

## 2023-09-04 RX ADMIN — SODIUM CHLORIDE 75 MILLILITER(S): 9 INJECTION, SOLUTION INTRAVENOUS at 15:51

## 2023-09-04 RX ADMIN — RIVAROXABAN 15 MILLIGRAM(S): KIT at 16:59

## 2023-09-04 RX ADMIN — Medication 100 MILLIGRAM(S): at 22:44

## 2023-09-04 RX ADMIN — CLOPIDOGREL BISULFATE 75 MILLIGRAM(S): 75 TABLET, FILM COATED ORAL at 10:51

## 2023-09-04 RX ADMIN — LORATADINE 10 MILLIGRAM(S): 10 TABLET ORAL at 10:51

## 2023-09-04 RX ADMIN — DORZOLAMIDE HYDROCHLORIDE TIMOLOL MALEATE 1 DROP(S): 20; 5 SOLUTION/ DROPS OPHTHALMIC at 12:19

## 2023-09-04 RX ADMIN — CHLORHEXIDINE GLUCONATE 1 APPLICATION(S): 213 SOLUTION TOPICAL at 12:18

## 2023-09-04 RX ADMIN — PIPERACILLIN AND TAZOBACTAM 25 GRAM(S): 4; .5 INJECTION, POWDER, LYOPHILIZED, FOR SOLUTION INTRAVENOUS at 15:50

## 2023-09-04 RX ADMIN — ATORVASTATIN CALCIUM 20 MILLIGRAM(S): 80 TABLET, FILM COATED ORAL at 21:01

## 2023-09-04 RX ADMIN — Medication 650 MILLIGRAM(S): at 01:10

## 2023-09-04 RX ADMIN — Medication 100 MILLIGRAM(S): at 16:59

## 2023-09-04 RX ADMIN — Medication 650 MILLIGRAM(S): at 17:00

## 2023-09-04 RX ADMIN — Medication 25 MILLIGRAM(S): at 21:01

## 2023-09-04 RX ADMIN — Medication 30 MILLILITER(S): at 13:05

## 2023-09-04 RX ADMIN — DORZOLAMIDE HYDROCHLORIDE TIMOLOL MALEATE 1 DROP(S): 20; 5 SOLUTION/ DROPS OPHTHALMIC at 21:01

## 2023-09-04 RX ADMIN — PIPERACILLIN AND TAZOBACTAM 25 GRAM(S): 4; .5 INJECTION, POWDER, LYOPHILIZED, FOR SOLUTION INTRAVENOUS at 06:17

## 2023-09-04 RX ADMIN — Medication 30 MILLILITER(S): at 06:26

## 2023-09-04 RX ADMIN — PIPERACILLIN AND TAZOBACTAM 25 GRAM(S): 4; .5 INJECTION, POWDER, LYOPHILIZED, FOR SOLUTION INTRAVENOUS at 22:15

## 2023-09-04 RX ADMIN — Medication 25 MILLIGRAM(S): at 10:51

## 2023-09-04 NOTE — PROGRESS NOTE ADULT - ASSESSMENT
82 year old female with PMH HTN, PAF (diagnosed 3 weeks ago), who  had a Left heart cath 1 week ago presents for SOB and PASCAL.     Assessment     1) NSTEMI  2) Afib w/RVR  3) Fever      Plan    Patient transferred to ICU after acutely developing rigors, SOB and fever.   Patient fever free for 24hours but this evening developed Fevers, SOB and tachypnea   Patient evaluated bedside. Given Tylenol   Abd exam: mild RUQ pain (not worsened with inspiration)  Will obtain HIDA in AM to evaluate for AChole  Patient on zoysn. Continue abx regimen, no indication for broader spectrum agents.  Sepsis work up initiated 24 hours prior: Negative UA, lactate, Bcx thus far  If HIDA is negative. Considering Hydroxyurea as cause for rigors. known side effect with recent initiated 2/2 to essential thrombocytosis   CT showed no acute septic pathology but showed diffuse gallbladder edema. No fat stranding or fluid collections noted.     trend symptomatology and fever curve  Cardiology following for NSTEMI  Continue Metoprolol, statin, plavix and Xarelto   ECHO showed EF 65%     Time spent on this patient encounter, which includes documenting this note in the electronic medical record, was 39 minutes including assessing the presenting problems with associated risks, reviewing the medical record to prepare for the encounter, and meeting face to face with patient to obtain additional history. I have also performed an appropriate physical exam, made interventions listed and ordered and interpreted appropriate diagnostic studies as documented. To improve communication and patient safety I have coordinated care with the multidisciplinary team including the bedside nurse, appropriate attending of record and consultants as needed. "

## 2023-09-04 NOTE — PROGRESS NOTE ADULT - SUBJECTIVE AND OBJECTIVE BOX
83 y/o female with recent hospitalization for CAD, underwent LAD PCI, paroxysmal A.fib, thrombocythemia on hydroxyurea   Presented with palpitations and PASCAL at home   Also with fever, diarrhea   She was initially admitted to the floor, had an episode of A.fib with RVR, high fever of 104.5.  9/4 Case discussed on CCU rounds, afebrile for HIDA in am.    ICU Vital Signs Last 24 Hrs  T(C): 36.9 (04 Sep 2023 05:30), Max: 38.5 (04 Sep 2023 00:30)  T(F): 98.4 (04 Sep 2023 05:30), Max: 101.3 (04 Sep 2023 00:30)  HR: 70 (04 Sep 2023 06:00) (63 - 81)  BP: 120/69 (04 Sep 2023 06:00) (87/65 - 144/72)  BP(mean): 85 (04 Sep 2023 06:00) (49 - 92)  RR: 23 (04 Sep 2023 06:00) (16 - 30)  SpO2: 93% (04 Sep 2023 06:00) (93% - 100%)    O2 Parameters below as of 04 Sep 2023 04:00  Patient On (Oxygen Delivery Method): nasal cannula  O2 Flow (L/min): 4                          11.0   8.47  )-----------( 299      ( 04 Sep 2023 05:52 )             32.2         09-04    141  |  110<H>  |  23  ----------------------------<  138<H>  3.6   |  25  |  0.96    Ca    8.3<L>      04 Sep 2023 05:52  Phos  3.5     09-04  Mg     2.0     09-04           Yes

## 2023-09-04 NOTE — PROGRESS NOTE ADULT - SUBJECTIVE AND OBJECTIVE BOX
Patient is a 82y old  Female who presents with a chief complaint of Palpitation, Elevated troponin, A fib (03 Sep 2023 08:59)        Events last 12 hours: Febrile. Rigors. 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          Review of Systems:  Negative unless stated      Medications:  piperacillin/tazobactam IVPB.. 3.375 Gram(s) IV Intermittent every 8 hours    metoprolol tartrate 25 milliGRAM(s) Oral two times a day    loratadine 10 milliGRAM(s) Oral daily    acetaminophen     Tablet .. 650 milliGRAM(s) Oral every 6 hours PRN  ondansetron Injectable 4 milliGRAM(s) IV Push every 8 hours PRN    hydroxyurea 500 milliGRAM(s) Oral <User Schedule>    clopidogrel Tablet 75 milliGRAM(s) Oral daily  rivaroxaban 15 milliGRAM(s) Oral with dinner    aluminum hydroxide/magnesium hydroxide/simethicone Suspension 30 milliLiter(s) Oral every 4 hours PRN      atorvastatin 20 milliGRAM(s) Oral at bedtime    lactated ringers. 1000 milliLiter(s) IV Continuous <Continuous>      chlorhexidine 4% Liquid 1 Application(s) Topical <User Schedule>  dorzolamide 2%/timolol 0.5% Ophthalmic Solution 1 Drop(s) Both EYES two times a day            ICU Vital Signs Last 24 Hrs  T(C): 38.5 (04 Sep 2023 00:30), Max: 38.5 (04 Sep 2023 00:30)  T(F): 101.3 (04 Sep 2023 00:30), Max: 101.3 (04 Sep 2023 00:30)  HR: 81 (04 Sep 2023 00:00) (52 - 94)  BP: 144/72 (04 Sep 2023 00:00) (87/51 - 155/88)  BP(mean): 92 (04 Sep 2023 00:00) (49 - 102)  ABP: --  ABP(mean): --  RR: 26 (04 Sep 2023 00:00) (17 - 34)  SpO2: 100% (03 Sep 2023 23:00) (91% - 100%)    O2 Parameters below as of 03 Sep 2023 20:00  Patient On (Oxygen Delivery Method): nasal cannula  O2 Flow (L/min): 3              I&O's Detail    02 Sep 2023 07:01  -  03 Sep 2023 07:00  --------------------------------------------------------  IN:    IV PiggyBack: 200 mL    Oral Fluid: 660 mL  Total IN: 860 mL    OUT:    Incontinent per Collection Bag (mL): 1050 mL    Voided (mL): 1000 mL  Total OUT: 2050 mL    Total NET: -1190 mL      03 Sep 2023 07:01  -  04 Sep 2023 01:12  --------------------------------------------------------  IN:    IV PiggyBack: 100 mL    Lactated Ringers: 700 mL    Oral Fluid: 150 mL  Total IN: 950 mL    OUT:    Voided (mL): 800 mL  Total OUT: 800 mL    Total NET: 150 mL            LABS:                        13.9   8.83  )-----------( 361      ( 03 Sep 2023 05:23 )             42.4     09-03    140  |  111<H>  |  22  ----------------------------<  107<H>  4.4   |  25  |  1.18    Ca    8.8      03 Sep 2023 05:23  Phos  3.7     09-02  Mg     2.2     09-03    TPro  7.1  /  Alb  3.2<L>  /  TBili  0.4  /  DBili  x   /  AST  52<H>  /  ALT  63  /  AlkPhos  86  09-03          CAPILLARY BLOOD GLUCOSE      POCT Blood Glucose.: 109 mg/dL (02 Sep 2023 14:00)      Urinalysis Basic - ( 03 Sep 2023 05:23 )    Color: x / Appearance: x / SG: x / pH: x  Gluc: 107 mg/dL / Ketone: x  / Bili: x / Urobili: x   Blood: x / Protein: x / Nitrite: x   Leuk Esterase: x / RBC: x / WBC x   Sq Epi: x / Non Sq Epi: x / Bacteria: x      CULTURES:  Culture Results:   No growth at 24 hours (09-02 @ 15:06)  Culture Results:   No growth at 24 hours (09-02 @ 15:02)  Culture Results:   No growth at 24 hours (09-01 @ 22:32)  Rapid RVP Result: NotDetec (09-01 @ 22:11)  Culture Results:   No growth at 24 hours (09-01 @ 22:10)      Physical Examination:    General: Rigors.     HEENT: Pupils equal, reactive to light.  Symmetric.    PULM: Clear to auscultation bilaterall    NECK: Supple, no lymphadenopathy, trachea midline    CVS: Regular rate and rhythm, no murmurs, rubs, or gallops    ABD: Mild tenderness. non- distended    EXT: No edema, nontender    SKIN: Warm and well perfused, no rashes noted.    NEURO: Alert, oriented, interactive, nonfocal    < from: CT Abdomen and Pelvis w/ IV Cont (09.02.23 @ 18:22) >    IMPRESSION:  *  No acute septic pathology.  *  No drainable collection.  *  Diffuse nonspecific gallbladder wall edema. Correlate for acute   cholecystitis.      --- End ofReport ---            JANESSA DIOP MD; Attending Radiologist  This document has been electronically signed. Sep  2 2023  7:28PM    < end of copied text >

## 2023-09-04 NOTE — PROGRESS NOTE ADULT - PROBLEM SELECTOR PLAN 2
Patient was having episodes of chills with loose BM   for last few days prior to hospitalization , noted to have high fever yesterday while having dyspnea with hypoxia without obvious vascular congestion on CT chest , no EKG changes , down trending troponin ,    HIDA scan ordered by critical care

## 2023-09-04 NOTE — PROGRESS NOTE ADULT - SUBJECTIVE AND OBJECTIVE BOX
CHIEF COMPLAINT: shortness of breath , some epigastric burning sensation on exertion yesterday evening     HPI:  83 y/o Female with a PMHx of left shoulder pain, stress incontinence, eczema, low back pain, arthritis, glaucoma, chronic rhinitis presents to the ED with complain of palpitations and dyspnea on exertion started yesterday evening  The patient was diagnosed with afib 3 weeks ago and placed on xarelto  , had subsequent cardiac cath for exertional chest tightness , noted LAD disease which was stented last week  , with improvement in symptoms  , patient was not feeling well for last couple of days with chills , fever , loose BM , got better  yesterday  without cough  Patient was in afib yesterday during his exam with her cardiologist , went home , started noticing shortness of breath , on taking different from prior to her stent , decided to come to ER where she was noted to have afib with rapid rate , patient did recieve IVF in ER and as well she drinking salty soups , eating salty crackers at home ,  patient heart rate improved in ER , developed carmela night SOB with desaturation last night , without chest pain , was placed on oxygen  , Patient Blood work showed elevated troponin      9/3/23 Events noted , patient developed shortness of breath with hypoxia  with high febrile episode ,  was having nausea ,  some back pain , temp 104  symptoms improved over some time , no recurrence of SOB while in CCU   CT chest did not show CHF or pneumonia    9/4/23 patient had mild degree of sob with febrile episode last night , no chest pain or shortness of breath now           PAST MEDICAL & SURGICAL HISTORY:  Chronic rhinitis      Glaucoma (increased eye pressure)      Arthritis      Low back pain      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        MEDICATIONS:Home Medications:  atorvastatin 20 mg oral tablet: 1 tab(s) orally once a day (02 Sep 2023 00:12)  dorzolamide-timolol 2.23%-0.68% (2%-0.5% base) ophthalmic solution: 1 drop(s) in each eye 2 times a day (02 Sep 2023 00:12)  hydroxyurea 500 mg oral capsule: 500 milligram(s) orally once a day take on M-W-F (02 Sep 2023 00:12)  loratadine 10 mg oral tablet: 1 tab(s) orally once a day (at bedtime) (02 Sep 2023 00:12)  metoprolol tartrate 25 mg oral tablet: 1 tab(s) orally 2 times a day (02 Sep 2023 00:12)  Xarelto 20 mg oral tablet: 1 tab(s) orally once a day (at bedtime) (02 Sep 2023 00:12)    MEDICATIONS  (STANDING):  atorvastatin 20 milliGRAM(s) Oral at bedtime  chlorhexidine 4% Liquid 1 Application(s) Topical <User Schedule>  clopidogrel Tablet 75 milliGRAM(s) Oral daily  dorzolamide 2%/timolol 0.5% Ophthalmic Solution 1 Drop(s) Both EYES two times a day  hydroxyurea 500 milliGRAM(s) Oral <User Schedule>  lactated ringers. 1000 milliLiter(s) (75 mL/Hr) IV Continuous <Continuous>  loratadine 10 milliGRAM(s) Oral daily  metoprolol tartrate 25 milliGRAM(s) Oral two times a day  piperacillin/tazobactam IVPB.. 3.375 Gram(s) IV Intermittent every 8 hours  rivaroxaban 15 milliGRAM(s) Oral with dinner    MEDICATIONS  (PRN):  acetaminophen     Tablet .. 650 milliGRAM(s) Oral every 6 hours PRN Temp greater or equal to 38C (100.4F), Mild Pain (1 - 3)  aluminum hydroxide/magnesium hydroxide/simethicone Suspension 30 milliLiter(s) Oral every 4 hours PRN Dyspepsia  ondansetron Injectable 4 milliGRAM(s) IV Push every 8 hours PRN Nausea and/or Vomiting    Vital Signs Last 24 Hrs  T(C): 36.9 (04 Sep 2023 05:30), Max: 38.5 (04 Sep 2023 00:30)  T(F): 98.4 (04 Sep 2023 05:30), Max: 101.3 (04 Sep 2023 00:30)  HR: 70 (04 Sep 2023 06:00) (63 - 81)  BP: 120/69 (04 Sep 2023 06:00) (87/65 - 144/72)  BP(mean): 85 (04 Sep 2023 06:00) (49 - 92)  RR: 23 (04 Sep 2023 06:00) (16 - 30)  SpO2: 93% (04 Sep 2023 06:00) (93% - 100%)    Parameters below as of 04 Sep 2023 04:00  Patient On (Oxygen Delivery Method): nasal cannula  O2 Flow (L/min): 4      I&O's Summary    03 Sep 2023 07:01  -  04 Sep 2023 07:00  --------------------------------------------------------  IN: 1975 mL / OUT: 1300 mL / NET: 675 mL          PHYSICAL EXAM:    Constitutional: NAD, awake and alert, well-developed  HEENT: PERR, EOMI,  No oral cyananosis.  Neck:  supple,  No JVD  Respiratory: Breath sounds equal minimal scattered wheeze , scanty basal crackles   Cardiovascular: S1 and S2, regular rate and rhythm, no Murmurs, gallops or rubs  Gastrointestinal: Bowel Sounds present, soft, nontender.   Extremities: No peripheral edema. No clubbing or cyanosis.  Vascular: 2+ peripheral pulses  Neurological: A/O x 3, no focal deficits  Musculoskeletal: no calf tenderness.  Skin: No rashes.      LABS: All Labs Reviewed:                                    11.0   8.47  )-----------( 299      ( 04 Sep 2023 05:52 )             32.2     09-04    141  |  110<H>  |  23  ----------------------------<  138<H>  3.6   |  25  |  0.96    Ca    8.3<L>      04 Sep 2023 05:52  Phos  3.5     09-04  Mg     2.0     09-04    TPro  7.1  /  Alb  3.2<L>  /  TBili  0.4  /  DBili  x   /  AST  52<H>  /  ALT  63  /  AlkPhos  86  09-03        LIVER FUNCTIONS - ( 03 Sep 2023 05:23 )  Alb: 3.2 g/dL / Pro: 7.1 gm/dL / ALK PHOS: 86 U/L / ALT: 63 U/L / AST: 52 U/L / GGT: x             Urinalysis Basic - ( 04 Sep 2023 05:52 )    Color: x / Appearance: x / SG: x / pH: x  Gluc: 138 mg/dL / Ketone: x  / Bili: x / Urobili: x   Blood: x / Protein: x / Nitrite: x   Leuk Esterase: x / RBC: x / WBC x   Sq Epi: x / Non Sq Epi: x / Bacteria: x        Culture Results:   No growth at 24 hours (09-02-23 @ 15:06)  Culture Results:   No growth at 24 hours (09-02-23 @ 15:02)  Culture Results:   No growth at 48 Hours (09-01-23 @ 22:32)  Culture Results:   No growth at 48 Hours (09-01-23 @ 22:10)    - TroponinI hsT: <-655.63, <-1045.45, <-1472.44, <-1832.57, <-1659.38      RADIOLOGY/EKG:  e< from: 12 Lead ECG (09.01.23 @ 20:30) >  Atrial flutter with variable A-V block  ST & T wave abnormality, consider inferior ischemia  ST & T wave abnormality, consider anterior ischemia  Abnormal ECG  Confirmed by FREDERICK VU MD ([id number]) on 9/2/2023 9:57:39 AM  - TroponinI hsT: <-1472.44, <-1832.57, <-1659.38  < end of copied text >  < from: 12 Lead ECG (09.01.23 @ 23:27) >  Normal sinus rhythm  Normal ECG  Confirmed by FREDERICK VU MD ([id number]) on 9/2/2023 9:59:05 AM    < end of copied text >  < from: Cardiac Catheterization (08.23.23 @ 15:35) >  oronary Angiography   The coronary circulation is right dominant.      LM   Left main artery: Angiography shows mild atherosclerosis.      LAD   Proximal left anterior descending: There is an 80 % stenosis.   NELSON was placed     CX   Proximal circumflex: There is a 30 % stenosis.      RCA   Right coronary artery: Angiography shows mild atherosclerosis.      Left Heart Cath   Left ventricular function was assessed. Ejection fraction was visually  estimated by estimation. LV to AO pullback was  performed. LHC performed: Aortic valve crossed and left ventricular  pressure obtained.    < end of copied text >    < from: CT Chest w/ IV Cont (09.02.23 @ 18:22) >    IMPRESSION:  *  No acute septic pathology.  *  No drainable collection.  *  Diffuse nonspecific gallbladder wall edema. Correlate for acute   cholecystitis.        < end of copied text >  < from: CT Chest w/ IV Cont (09.02.23 @ 18:22) >  LUNGS AND LARGE AIRWAYS: Thecentral airways are patent. Bibasilar   atelectasis.  PLEURA: Small bilateral pleural effusions.  VESSELS: Normal caliber aorta. Main pulmonary arteries are patent.  HEART: No cardiomegaly. No pericardial effusion. Coronary artery   calcifications are present.    < end of copied text >  < from: TTE Echo Complete w/o Contrast w/ Doppler (09.02.23 @ 08:38) >     There is calcification of anterior mitral valve leaflet. The leaflet   opening is normal.   Mild mitral annular calcification is present.   Mild to Moderate mitral regurgitation is present.   EA reversal of the mitral inflow consistent with reduced compliance of   the   left ventricle.   The aortic valve is well visualized, appears mildly calcified. Valve   opening seems to be normal.   Normal appearing tricuspid valve structure.   Moderate (2+) tricuspid valve regurgitation is present.   Moderate pulmonary hypertension.   Normal appearing pulmonic valve structure.   Mild pulmonic valvular regurgitation (1+) is present.   The left atriumis mildly dilated.   Estimated left ventricular ejection fraction is 60-65 %.   The left ventricle is normal in size, wall motion and contractility as   seen in limited views.   Mild concentric left ventricular hypertrophy is present.   The right atrium appears borderline mildly dilated.   Normal appearing right ventricle structure and function.   IVC is dilated and not collapsing with inspiration.    < end of copied text >    < from: 12 Lead ECG (09.02.23 @ 14:19) >  Normal sinus rhythm  Normal ECG  Confirmed by FREDERICK UV MD ([id number]) on 9/2/2023 5:01:23 PM    < end of copied text >

## 2023-09-05 DIAGNOSIS — R00.0 TACHYCARDIA, UNSPECIFIED: ICD-10-CM

## 2023-09-05 LAB
ANION GAP SERPL CALC-SCNC: 6 MMOL/L — SIGNIFICANT CHANGE UP (ref 5–17)
BUN SERPL-MCNC: 17 MG/DL — SIGNIFICANT CHANGE UP (ref 7–23)
CALCIUM SERPL-MCNC: 8.3 MG/DL — LOW (ref 8.5–10.1)
CHLORIDE SERPL-SCNC: 109 MMOL/L — HIGH (ref 96–108)
CO2 SERPL-SCNC: 25 MMOL/L — SIGNIFICANT CHANGE UP (ref 22–31)
CREAT SERPL-MCNC: 0.91 MG/DL — SIGNIFICANT CHANGE UP (ref 0.5–1.3)
EGFR: 63 ML/MIN/1.73M2 — SIGNIFICANT CHANGE UP
GLUCOSE SERPL-MCNC: 133 MG/DL — HIGH (ref 70–99)
HCT VFR BLD CALC: 32.3 % — LOW (ref 34.5–45)
HGB BLD-MCNC: 10.9 G/DL — LOW (ref 11.5–15.5)
MAGNESIUM SERPL-MCNC: 1.9 MG/DL — SIGNIFICANT CHANGE UP (ref 1.6–2.6)
MCHC RBC-ENTMCNC: 30.4 PG — SIGNIFICANT CHANGE UP (ref 27–34)
MCHC RBC-ENTMCNC: 33.7 GM/DL — SIGNIFICANT CHANGE UP (ref 32–36)
MCV RBC AUTO: 90 FL — SIGNIFICANT CHANGE UP (ref 80–100)
PHOSPHATE SERPL-MCNC: 3.2 MG/DL — SIGNIFICANT CHANGE UP (ref 2.5–4.5)
PLATELET # BLD AUTO: 316 K/UL — SIGNIFICANT CHANGE UP (ref 150–400)
POTASSIUM SERPL-MCNC: 3.8 MMOL/L — SIGNIFICANT CHANGE UP (ref 3.5–5.3)
POTASSIUM SERPL-SCNC: 3.8 MMOL/L — SIGNIFICANT CHANGE UP (ref 3.5–5.3)
RBC # BLD: 3.59 M/UL — LOW (ref 3.8–5.2)
RBC # FLD: 15.9 % — HIGH (ref 10.3–14.5)
SODIUM SERPL-SCNC: 140 MMOL/L — SIGNIFICANT CHANGE UP (ref 135–145)
WBC # BLD: 7.44 K/UL — SIGNIFICANT CHANGE UP (ref 3.8–10.5)
WBC # FLD AUTO: 7.44 K/UL — SIGNIFICANT CHANGE UP (ref 3.8–10.5)

## 2023-09-05 PROCEDURE — 78226 HEPATOBILIARY SYSTEM IMAGING: CPT | Mod: 26

## 2023-09-05 PROCEDURE — 93010 ELECTROCARDIOGRAM REPORT: CPT

## 2023-09-05 PROCEDURE — 99233 SBSQ HOSP IP/OBS HIGH 50: CPT

## 2023-09-05 PROCEDURE — 99232 SBSQ HOSP IP/OBS MODERATE 35: CPT

## 2023-09-05 RX ORDER — METOPROLOL TARTRATE 50 MG
2.5 TABLET ORAL ONCE
Refills: 0 | Status: COMPLETED | OUTPATIENT
Start: 2023-09-05 | End: 2023-09-05

## 2023-09-05 RX ORDER — POTASSIUM CHLORIDE 20 MEQ
10 PACKET (EA) ORAL
Refills: 0 | Status: COMPLETED | OUTPATIENT
Start: 2023-09-05 | End: 2023-09-05

## 2023-09-05 RX ORDER — DILTIAZEM HCL 120 MG
5 CAPSULE, EXT RELEASE 24 HR ORAL
Qty: 125 | Refills: 0 | Status: DISCONTINUED | OUTPATIENT
Start: 2023-09-05 | End: 2023-09-07

## 2023-09-05 RX ORDER — MAGNESIUM SULFATE 500 MG/ML
1 VIAL (ML) INJECTION ONCE
Refills: 0 | Status: COMPLETED | OUTPATIENT
Start: 2023-09-05 | End: 2023-09-05

## 2023-09-05 RX ORDER — DILTIAZEM HCL 120 MG
5 CAPSULE, EXT RELEASE 24 HR ORAL ONCE
Refills: 0 | Status: COMPLETED | OUTPATIENT
Start: 2023-09-05 | End: 2023-09-05

## 2023-09-05 RX ADMIN — Medication 2.5 MILLIGRAM(S): at 08:04

## 2023-09-05 RX ADMIN — Medication 5 MG/HR: at 23:56

## 2023-09-05 RX ADMIN — PIPERACILLIN AND TAZOBACTAM 25 GRAM(S): 4; .5 INJECTION, POWDER, LYOPHILIZED, FOR SOLUTION INTRAVENOUS at 09:11

## 2023-09-05 RX ADMIN — LORATADINE 10 MILLIGRAM(S): 10 TABLET ORAL at 14:13

## 2023-09-05 RX ADMIN — Medication 100 GRAM(S): at 08:29

## 2023-09-05 RX ADMIN — Medication 5 MILLIGRAM(S): at 09:33

## 2023-09-05 RX ADMIN — Medication 650 MILLIGRAM(S): at 06:21

## 2023-09-05 RX ADMIN — DORZOLAMIDE HYDROCHLORIDE TIMOLOL MALEATE 1 DROP(S): 20; 5 SOLUTION/ DROPS OPHTHALMIC at 10:17

## 2023-09-05 RX ADMIN — Medication 650 MILLIGRAM(S): at 05:13

## 2023-09-05 RX ADMIN — Medication 100 MILLIGRAM(S): at 21:09

## 2023-09-05 RX ADMIN — PIPERACILLIN AND TAZOBACTAM 25 GRAM(S): 4; .5 INJECTION, POWDER, LYOPHILIZED, FOR SOLUTION INTRAVENOUS at 22:06

## 2023-09-05 RX ADMIN — RIVAROXABAN 15 MILLIGRAM(S): KIT at 17:20

## 2023-09-05 RX ADMIN — SODIUM CHLORIDE 75 MILLILITER(S): 9 INJECTION, SOLUTION INTRAVENOUS at 03:35

## 2023-09-05 RX ADMIN — SODIUM CHLORIDE 75 MILLILITER(S): 9 INJECTION, SOLUTION INTRAVENOUS at 17:20

## 2023-09-05 RX ADMIN — ATORVASTATIN CALCIUM 20 MILLIGRAM(S): 80 TABLET, FILM COATED ORAL at 21:09

## 2023-09-05 RX ADMIN — DORZOLAMIDE HYDROCHLORIDE TIMOLOL MALEATE 1 DROP(S): 20; 5 SOLUTION/ DROPS OPHTHALMIC at 21:10

## 2023-09-05 RX ADMIN — ONDANSETRON 4 MILLIGRAM(S): 8 TABLET, FILM COATED ORAL at 07:28

## 2023-09-05 RX ADMIN — Medication 25 MILLIGRAM(S): at 07:51

## 2023-09-05 RX ADMIN — Medication 100 MILLIEQUIVALENT(S): at 08:37

## 2023-09-05 RX ADMIN — PIPERACILLIN AND TAZOBACTAM 25 GRAM(S): 4; .5 INJECTION, POWDER, LYOPHILIZED, FOR SOLUTION INTRAVENOUS at 14:45

## 2023-09-05 RX ADMIN — ONDANSETRON 4 MILLIGRAM(S): 8 TABLET, FILM COATED ORAL at 17:21

## 2023-09-05 RX ADMIN — Medication 100 MILLIEQUIVALENT(S): at 09:10

## 2023-09-05 RX ADMIN — CLOPIDOGREL BISULFATE 75 MILLIGRAM(S): 75 TABLET, FILM COATED ORAL at 14:13

## 2023-09-05 NOTE — PROGRESS NOTE ADULT - PROBLEM SELECTOR PLAN 5
as above  LAD NELSON placement last week.

## 2023-09-05 NOTE — PROGRESS NOTE ADULT - SUBJECTIVE AND OBJECTIVE BOX
81 y/o female with recent hospitalization for CAD, underwent LAD PCI, paroxysmal A.fib, thrombocythemia on hydroxyurea   Presented with palpitations and PASCAL at home   Also with fever, diarrhea   She was initially admitted to the floor, had an episode of A.fib with RVR, high fever of 104.5.  9/4 Case discussed on CCU rounds, afebrile for HIDA in am.  9/5 Case discussed on CCU rounds, HIDA- negative, still with low grade temps., and AF RVR.      ICU Vital Signs Last 24 Hrs  T(C): 36.6 (05 Sep 2023 11:08), Max: 38.4 (05 Sep 2023 05:12)  T(F): 97.9 (05 Sep 2023 11:08), Max: 101.2 (05 Sep 2023 05:12)  HR: 116 (05 Sep 2023 14:01) (68 - 133)  BP: 92/68 (05 Sep 2023 14:01) (83/66 - 155/98)  BP(mean): 76 (05 Sep 2023 14:01) (67 - 130)  RR: 33 (05 Sep 2023 14:01) (17 - 36)  SpO2: 81% (05 Sep 2023 14:01) (74% - 100%)                                  10.9   7.44  )-----------( 316      ( 05 Sep 2023 06:13 )             32.3         09-05    140  |  109<H>  |  17  ----------------------------<  133<H>  3.8   |  25  |  0.91    Ca    8.3<L>      05 Sep 2023 06:13  Phos  3.2     09-05  Mg     1.9     09-05

## 2023-09-05 NOTE — PROGRESS NOTE ADULT - SUBJECTIVE AND OBJECTIVE BOX
CHIEF COMPLAINT: shortness of breath , some epigastric burning sensation on exertion yesterday evening     HPI:  83 y/o Female with a PMHx of left shoulder pain, stress incontinence, eczema, low back pain, arthritis, glaucoma, chronic rhinitis presents to the ED with complain of palpitations and dyspnea on exertion started yesterday evening  The patient was diagnosed with afib 3 weeks ago and placed on xarelto  , had subsequent cardiac cath for exertional chest tightness , noted LAD disease which was stented last week  , with improvement in symptoms  , patient was not feeling well for last couple of days with chills , fever , loose BM , got better  yesterday  without cough  Patient was in afib yesterday during his exam with her cardiologist , went home , started noticing shortness of breath , on taking different from prior to her stent , decided to come to ER where she was noted to have afib with rapid rate , patient did recieve IVF in ER and as well she drinking salty soups , eating salty crackers at home ,  patient heart rate improved in ER , developed carmela night SOB with desaturation last night , without chest pain , was placed on oxygen  , Patient Blood work showed elevated troponin      9/3/23 Events noted , patient developed shortness of breath with hypoxia  with high febrile episode ,  was having nausea ,  some back pain , temp 104  symptoms improved over some time , no recurrence of SOB while in CCU   CT chest did not show CHF or pneumonia    9/4/23 patient had mild degree of sob with febrile episode last night , no chest pain or shortness of breath now   9/5/23 Patient is feeling nauseous , mild abdominal discomfort , in afib with rapid rate since this morning ,   no sob           PAST MEDICAL & SURGICAL HISTORY:  Chronic rhinitis      Glaucoma (increased eye pressure)      Arthritis      Low back pain      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        MEDICATIONS:Home Medications:  atorvastatin 20 mg oral tablet: 1 tab(s) orally once a day (02 Sep 2023 00:12)  dorzolamide-timolol 2.23%-0.68% (2%-0.5% base) ophthalmic solution: 1 drop(s) in each eye 2 times a day (02 Sep 2023 00:12)  hydroxyurea 500 mg oral capsule: 500 milligram(s) orally once a day take on M-W-F (02 Sep 2023 00:12)  loratadine 10 mg oral tablet: 1 tab(s) orally once a day (at bedtime) (02 Sep 2023 00:12)  metoprolol tartrate 25 mg oral tablet: 1 tab(s) orally 2 times a day (02 Sep 2023 00:12)  Xarelto 20 mg oral tablet: 1 tab(s) orally once a day (at bedtime) (02 Sep 2023 00:12)    MEDICATIONS  (STANDING):  atorvastatin 20 milliGRAM(s) Oral at bedtime  chlorhexidine 4% Liquid 1 Application(s) Topical <User Schedule>  clopidogrel Tablet 75 milliGRAM(s) Oral daily  dorzolamide 2%/timolol 0.5% Ophthalmic Solution 1 Drop(s) Both EYES two times a day  hydroxyurea 500 milliGRAM(s) Oral <User Schedule>  lactated ringers. 1000 milliLiter(s) (75 mL/Hr) IV Continuous <Continuous>  loratadine 10 milliGRAM(s) Oral daily  magnesium sulfate  IVPB 1 Gram(s) IV Intermittent once  metoprolol tartrate 25 milliGRAM(s) Oral two times a day  piperacillin/tazobactam IVPB.. 3.375 Gram(s) IV Intermittent every 8 hours  potassium chloride  10 mEq/100 mL IVPB 10 milliEquivalent(s) IV Intermittent every 1 hour  rivaroxaban 15 milliGRAM(s) Oral with dinner    MEDICATIONS  (PRN):  acetaminophen     Tablet .. 650 milliGRAM(s) Oral every 6 hours PRN Temp greater or equal to 38C (100.4F), Mild Pain (1 - 3)  aluminum hydroxide/magnesium hydroxide/simethicone Suspension 30 milliLiter(s) Oral every 4 hours PRN Dyspepsia  guaiFENesin Oral Liquid (Sugar-Free) 100 milliGRAM(s) Oral every 6 hours PRN Cough  ondansetron Injectable 4 milliGRAM(s) IV Push every 8 hours PRN Nausea and/or Vomiting    Vital Signs Last 24 Hrs  T(C): 37.9 (05 Sep 2023 06:20), Max: 38.4 (05 Sep 2023 05:12)  T(F): 100.2 (05 Sep 2023 06:20), Max: 101.2 (05 Sep 2023 05:12)  HR: 133 (05 Sep 2023 08:03) (65 - 133)  BP: 100/73 (05 Sep 2023 08:03) (83/66 - 155/98)  BP(mean): 82 (05 Sep 2023 08:03) (67 - 130)  RR: 19 (05 Sep 2023 08:03) (17 - 36)  SpO2: 98% (05 Sep 2023 08:03) (74% - 100%)        I&O's Summary    04 Sep 2023 07:01  -  05 Sep 2023 07:00  --------------------------------------------------------  IN: 1925 mL / OUT: 1400 mL / NET: 525 mL          PHYSICAL EXAM:    Constitutional: NAD, awake and alert, well-developed  HEENT: PERR, EOMI,  No oral cyananosis.  Neck:  supple,  No JVD  Respiratory: Breath sounds equal minimal scattered wheeze , scanty basal crackles   Cardiovascular: S1 and S2, regular rate and rhythm, no Murmurs, gallops or rubs  Gastrointestinal: Bowel Sounds present, soft, nontender.   Extremities: No peripheral edema. No clubbing or cyanosis.  Vascular: 2+ peripheral pulses  Neurological: A/O x 3, no focal deficits  Musculoskeletal: no calf tenderness.  Skin: No rashes.      LABS: All Labs Reviewed:                                      10.9   7.44  )-----------( 316      ( 05 Sep 2023 06:13 )             32.3     09-05    140  |  109<H>  |  17  ----------------------------<  133<H>  3.8   |  25  |  0.91    Ca    8.3<L>      05 Sep 2023 06:13  Phos  3.2     09-05  Mg     1.9     09-05              Urinalysis Basic - ( 05 Sep 2023 06:13 )    Color: x / Appearance: x / SG: x / pH: x  Gluc: 133 mg/dL / Ketone: x  / Bili: x / Urobili: x   Blood: x / Protein: x / Nitrite: x   Leuk Esterase: x / RBC: x / WBC x   Sq Epi: x / Non Sq Epi: x / Bacteria: x        Culture Results:   No growth at 48 Hours (09-02-23 @ 15:06)  Culture Results:   No growth at 48 Hours (09-02-23 @ 15:02)  Culture Results:   No growth at 72 Hours (09-01-23 @ 22:32)  Culture Results:   No growth at 72 Hours (09-01-23 @ 22:10)      RADIOLOGY/EKG:  e< from: 12 Lead ECG (09.01.23 @ 20:30) >  Atrial flutter with variable A-V block  ST & T wave abnormality, consider inferior ischemia  ST & T wave abnormality, consider anterior ischemia  Abnormal ECG  Confirmed by FREDERICK VU MD ([id number]) on 9/2/2023 9:57:39 AM  - TroponinI hsT: <-1472.44, <-1832.57, <-1659.38  < end of copied text >  < from: 12 Lead ECG (09.01.23 @ 23:27) >  Normal sinus rhythm  Normal ECG  Confirmed by FREDERICK VU MD ([id number]) on 9/2/2023 9:59:05 AM    < end of copied text >  < from: Cardiac Catheterization (08.23.23 @ 15:35) >  oronary Angiography   The coronary circulation is right dominant.      LM   Left main artery: Angiography shows mild atherosclerosis.      LAD   Proximal left anterior descending: There is an 80 % stenosis.   NELSON was placed     CX   Proximal circumflex: There is a 30 % stenosis.      RCA   Right coronary artery: Angiography shows mild atherosclerosis.      Left Heart Cath   Left ventricular function was assessed. Ejection fraction was visually  estimated by estimation. LV to AO pullback was  performed. LHC performed: Aortic valve crossed and left ventricular  pressure obtained.    < end of copied text >    < from: CT Chest w/ IV Cont (09.02.23 @ 18:22) >    IMPRESSION:  *  No acute septic pathology.  *  No drainable collection.  *  Diffuse nonspecific gallbladder wall edema. Correlate for acute   cholecystitis.        < end of copied text >  < from: CT Chest w/ IV Cont (09.02.23 @ 18:22) >  LUNGS AND LARGE AIRWAYS: Thecentral airways are patent. Bibasilar   atelectasis.  PLEURA: Small bilateral pleural effusions.  VESSELS: Normal caliber aorta. Main pulmonary arteries are patent.  HEART: No cardiomegaly. No pericardial effusion. Coronary artery   calcifications are present.    < end of copied text >  < from: TTE Echo Complete w/o Contrast w/ Doppler (09.02.23 @ 08:38) >     There is calcification of anterior mitral valve leaflet. The leaflet   opening is normal.   Mild mitral annular calcification is present.   Mild to Moderate mitral regurgitation is present.   EA reversal of the mitral inflow consistent with reduced compliance of   the   left ventricle.   The aortic valve is well visualized, appears mildly calcified. Valve   opening seems to be normal.   Normal appearing tricuspid valve structure.   Moderate (2+) tricuspid valve regurgitation is present.   Moderate pulmonary hypertension.   Normal appearing pulmonic valve structure.   Mild pulmonic valvular regurgitation (1+) is present.   The left atriumis mildly dilated.   Estimated left ventricular ejection fraction is 60-65 %.   The left ventricle is normal in size, wall motion and contractility as   seen in limited views.   Mild concentric left ventricular hypertrophy is present.   The right atrium appears borderline mildly dilated.   Normal appearing right ventricle structure and function.   IVC is dilated and not collapsing with inspiration.    < end of copied text >    < from: 12 Lead ECG (09.02.23 @ 14:19) >  Normal sinus rhythm  Normal ECG  Confirmed by FREDERICK VU MD ([id number]) on 9/2/2023 5:01:23 PM    < end of copied text >    Monitor afib with RVR started 7.30 am today ,

## 2023-09-05 NOTE — PROGRESS NOTE ADULT - CRITICAL CARE SERVICES PROVIDED
Patient is critically ill, requiring critical care services.
Patient is critically ill, requiring critical care services.
No
Patient is critically ill, requiring critical care services.

## 2023-09-05 NOTE — PROGRESS NOTE ADULT - RESPIRATORY
airway patent/good air movement/respirations non-labored
clear to auscultation bilaterally/no wheezes/no respiratory distress
airway patent/good air movement/respirations non-labored

## 2023-09-06 LAB
ALBUMIN SERPL ELPH-MCNC: 2.5 G/DL — LOW (ref 3.3–5)
ALP SERPL-CCNC: 109 U/L — SIGNIFICANT CHANGE UP (ref 40–120)
ALT FLD-CCNC: 85 U/L — HIGH (ref 12–78)
ANION GAP SERPL CALC-SCNC: 6 MMOL/L — SIGNIFICANT CHANGE UP (ref 5–17)
AST SERPL-CCNC: 82 U/L — HIGH (ref 15–37)
BILIRUB DIRECT SERPL-MCNC: 0.1 MG/DL — SIGNIFICANT CHANGE UP (ref 0–0.3)
BILIRUB INDIRECT FLD-MCNC: 0.3 MG/DL — SIGNIFICANT CHANGE UP (ref 0.2–1)
BILIRUB SERPL-MCNC: 0.4 MG/DL — SIGNIFICANT CHANGE UP (ref 0.2–1.2)
BUN SERPL-MCNC: 14 MG/DL — SIGNIFICANT CHANGE UP (ref 7–23)
CALCIUM SERPL-MCNC: 8.6 MG/DL — SIGNIFICANT CHANGE UP (ref 8.5–10.1)
CHLORIDE SERPL-SCNC: 110 MMOL/L — HIGH (ref 96–108)
CO2 SERPL-SCNC: 26 MMOL/L — SIGNIFICANT CHANGE UP (ref 22–31)
CREAT SERPL-MCNC: 0.8 MG/DL — SIGNIFICANT CHANGE UP (ref 0.5–1.3)
EGFR: 74 ML/MIN/1.73M2 — SIGNIFICANT CHANGE UP
GLUCOSE SERPL-MCNC: 119 MG/DL — HIGH (ref 70–99)
HCT VFR BLD CALC: 36 % — SIGNIFICANT CHANGE UP (ref 34.5–45)
HGB BLD-MCNC: 11.7 G/DL — SIGNIFICANT CHANGE UP (ref 11.5–15.5)
LACTATE SERPL-SCNC: 0.9 MMOL/L — SIGNIFICANT CHANGE UP (ref 0.7–2)
LIDOCAIN IGE QN: 56 U/L — SIGNIFICANT CHANGE UP (ref 13–75)
MAGNESIUM SERPL-MCNC: 2.3 MG/DL — SIGNIFICANT CHANGE UP (ref 1.6–2.6)
MCHC RBC-ENTMCNC: 29.7 PG — SIGNIFICANT CHANGE UP (ref 27–34)
MCHC RBC-ENTMCNC: 32.5 GM/DL — SIGNIFICANT CHANGE UP (ref 32–36)
MCV RBC AUTO: 91.4 FL — SIGNIFICANT CHANGE UP (ref 80–100)
NT-PROBNP SERPL-SCNC: 6145 PG/ML — HIGH (ref 0–450)
PHOSPHATE SERPL-MCNC: 3 MG/DL — SIGNIFICANT CHANGE UP (ref 2.5–4.5)
PLATELET # BLD AUTO: 350 K/UL — SIGNIFICANT CHANGE UP (ref 150–400)
POTASSIUM SERPL-MCNC: 4 MMOL/L — SIGNIFICANT CHANGE UP (ref 3.5–5.3)
POTASSIUM SERPL-SCNC: 4 MMOL/L — SIGNIFICANT CHANGE UP (ref 3.5–5.3)
PROCALCITONIN SERPL-MCNC: 0.73 NG/ML — HIGH (ref 0.02–0.1)
PROT SERPL-MCNC: 6.6 GM/DL — SIGNIFICANT CHANGE UP (ref 6–8.3)
RBC # BLD: 3.94 M/UL — SIGNIFICANT CHANGE UP (ref 3.8–5.2)
RBC # FLD: 16 % — HIGH (ref 10.3–14.5)
SODIUM SERPL-SCNC: 142 MMOL/L — SIGNIFICANT CHANGE UP (ref 135–145)
T4 AB SER-ACNC: 4 UG/DL — LOW (ref 4.6–12)
TROPONIN I, HIGH SENSITIVITY RESULT: 105.32 NG/L — HIGH
TSH SERPL-MCNC: 2.81 UU/ML — SIGNIFICANT CHANGE UP (ref 0.34–4.82)
WBC # BLD: 5.31 K/UL — SIGNIFICANT CHANGE UP (ref 3.8–10.5)
WBC # FLD AUTO: 5.31 K/UL — SIGNIFICANT CHANGE UP (ref 3.8–10.5)

## 2023-09-06 PROCEDURE — 99233 SBSQ HOSP IP/OBS HIGH 50: CPT

## 2023-09-06 PROCEDURE — 71045 X-RAY EXAM CHEST 1 VIEW: CPT | Mod: 26

## 2023-09-06 RX ORDER — ALPRAZOLAM 0.25 MG
0.5 TABLET ORAL ONCE
Refills: 0 | Status: DISCONTINUED | OUTPATIENT
Start: 2023-09-06 | End: 2023-09-06

## 2023-09-06 RX ORDER — FUROSEMIDE 40 MG
20 TABLET ORAL EVERY 12 HOURS
Refills: 0 | Status: DISCONTINUED | OUTPATIENT
Start: 2023-09-06 | End: 2023-09-07

## 2023-09-06 RX ORDER — DIGOXIN 250 MCG
250 TABLET ORAL ONCE
Refills: 0 | Status: COMPLETED | OUTPATIENT
Start: 2023-09-06 | End: 2023-09-06

## 2023-09-06 RX ORDER — FUROSEMIDE 40 MG
20 TABLET ORAL ONCE
Refills: 0 | Status: COMPLETED | OUTPATIENT
Start: 2023-09-06 | End: 2023-09-06

## 2023-09-06 RX ORDER — DILTIAZEM HCL 120 MG
30 CAPSULE, EXT RELEASE 24 HR ORAL EVERY 6 HOURS
Refills: 0 | Status: DISCONTINUED | OUTPATIENT
Start: 2023-09-06 | End: 2023-09-12

## 2023-09-06 RX ORDER — ACETAMINOPHEN 500 MG
1000 TABLET ORAL ONCE
Refills: 0 | Status: COMPLETED | OUTPATIENT
Start: 2023-09-06 | End: 2023-09-06

## 2023-09-06 RX ADMIN — RIVAROXABAN 15 MILLIGRAM(S): KIT at 17:51

## 2023-09-06 RX ADMIN — DORZOLAMIDE HYDROCHLORIDE TIMOLOL MALEATE 1 DROP(S): 20; 5 SOLUTION/ DROPS OPHTHALMIC at 21:17

## 2023-09-06 RX ADMIN — Medication 20 MILLIGRAM(S): at 21:13

## 2023-09-06 RX ADMIN — PIPERACILLIN AND TAZOBACTAM 25 GRAM(S): 4; .5 INJECTION, POWDER, LYOPHILIZED, FOR SOLUTION INTRAVENOUS at 07:58

## 2023-09-06 RX ADMIN — Medication 0.5 MILLIGRAM(S): at 14:44

## 2023-09-06 RX ADMIN — Medication 30 MILLIGRAM(S): at 21:12

## 2023-09-06 RX ADMIN — CLOPIDOGREL BISULFATE 75 MILLIGRAM(S): 75 TABLET, FILM COATED ORAL at 10:31

## 2023-09-06 RX ADMIN — HYDROXYUREA 500 MILLIGRAM(S): 500 CAPSULE ORAL at 07:58

## 2023-09-06 RX ADMIN — Medication 20 MILLIGRAM(S): at 14:43

## 2023-09-06 RX ADMIN — ATORVASTATIN CALCIUM 20 MILLIGRAM(S): 80 TABLET, FILM COATED ORAL at 21:12

## 2023-09-06 RX ADMIN — Medication 250 MICROGRAM(S): at 11:30

## 2023-09-06 RX ADMIN — PIPERACILLIN AND TAZOBACTAM 25 GRAM(S): 4; .5 INJECTION, POWDER, LYOPHILIZED, FOR SOLUTION INTRAVENOUS at 21:12

## 2023-09-06 RX ADMIN — Medication 30 MILLIGRAM(S): at 14:44

## 2023-09-06 RX ADMIN — Medication 25 MILLIGRAM(S): at 10:31

## 2023-09-06 RX ADMIN — DORZOLAMIDE HYDROCHLORIDE TIMOLOL MALEATE 1 DROP(S): 20; 5 SOLUTION/ DROPS OPHTHALMIC at 10:33

## 2023-09-06 RX ADMIN — PIPERACILLIN AND TAZOBACTAM 25 GRAM(S): 4; .5 INJECTION, POWDER, LYOPHILIZED, FOR SOLUTION INTRAVENOUS at 15:53

## 2023-09-06 RX ADMIN — Medication 100 MILLIGRAM(S): at 05:34

## 2023-09-06 RX ADMIN — LORATADINE 10 MILLIGRAM(S): 10 TABLET ORAL at 10:31

## 2023-09-06 RX ADMIN — Medication 25 MILLIGRAM(S): at 21:13

## 2023-09-06 RX ADMIN — Medication 650 MILLIGRAM(S): at 13:40

## 2023-09-06 RX ADMIN — CHLORHEXIDINE GLUCONATE 1 APPLICATION(S): 213 SOLUTION TOPICAL at 07:58

## 2023-09-06 RX ADMIN — Medication 100 MILLIGRAM(S): at 10:32

## 2023-09-06 RX ADMIN — Medication 650 MILLIGRAM(S): at 14:05

## 2023-09-06 NOTE — DIETITIAN INITIAL EVALUATION ADULT - PERTINENT LABORATORY DATA
09-06    142  |  110<H>  |  14  ----------------------------<  119<H>  4.0   |  26  |  0.80    Ca    8.6      06 Sep 2023 07:47  Phos  3.0     09-06  Mg     2.3     09-06    Iron Total, Serum: 51 ug/dL (06-16-23 @ 16:03)

## 2023-09-06 NOTE — DIETITIAN INITIAL EVALUATION ADULT - PERTINENT MEDS FT
MEDICATIONS  (STANDING):  atorvastatin 20 milliGRAM(s) Oral at bedtime  chlorhexidine 4% Liquid 1 Application(s) Topical <User Schedule>  clopidogrel Tablet 75 milliGRAM(s) Oral daily  diltiazem Infusion 5 mG/Hr (5 mL/Hr) IV Continuous <Continuous>  dorzolamide 2%/timolol 0.5% Ophthalmic Solution 1 Drop(s) Both EYES two times a day  hydroxyurea 500 milliGRAM(s) Oral <User Schedule>  loratadine 10 milliGRAM(s) Oral daily  metoprolol tartrate 25 milliGRAM(s) Oral two times a day  piperacillin/tazobactam IVPB.. 3.375 Gram(s) IV Intermittent every 8 hours  rivaroxaban 15 milliGRAM(s) Oral with dinner    MEDICATIONS  (PRN):  acetaminophen     Tablet .. 650 milliGRAM(s) Oral every 6 hours PRN Temp greater or equal to 38C (100.4F), Mild Pain (1 - 3)  aluminum hydroxide/magnesium hydroxide/simethicone Suspension 30 milliLiter(s) Oral every 4 hours PRN Dyspepsia  guaiFENesin Oral Liquid (Sugar-Free) 100 milliGRAM(s) Oral every 6 hours PRN Cough  ondansetron Injectable 4 milliGRAM(s) IV Push every 8 hours PRN Nausea and/or Vomiting    Home Medications:  atorvastatin 20 mg oral tablet: 1 tab(s) orally once a day (03 Sep 2023 13:03)  dorzolamide-timolol 2.23%-0.68% (2%-0.5% base) ophthalmic solution: 1 drop(s) in each eye 2 times a day (03 Sep 2023 13:03)  hydroxyurea 500 mg oral capsule: 500 milligram(s) orally once a day take on M-W-F (02 Sep 2023 00:12)  loratadine 10 mg oral tablet: 1 tab(s) orally once a day (at bedtime) (02 Sep 2023 00:12)  metoprolol tartrate 25 mg oral tablet: 1 tab(s) orally 2 times a day (03 Sep 2023 13:03)  Xarelto 20 mg oral tablet: 1 tab(s) orally once a day (at bedtime) (02 Sep 2023 00:12)

## 2023-09-06 NOTE — PROGRESS NOTE ADULT - SUBJECTIVE AND OBJECTIVE BOX
83 y/o female with recent hospitalization for CAD, underwent LAD PCI, paroxysmal A.fib, thrombocythemia on hydroxyurea   Presented with palpitations and PASCAL at home   Also with fever, diarrhea   She was initially admitted to the floor, had an episode of A.fib with RVR, high fever of 104.5.  9/4 Case discussed on CCU rounds, afebrile for HIDA in am.  9/5 Case discussed on CCU rounds, HIDA- negative, still with low grade temps., and AF RVR.  9/6 - patient had tmax 102F, and patient had worsening afib rvr, cardizem dripped went up to 10, s/p IV tylenol with improvement of her rigors.  s/p blood culture       Vital Signs Last 24 Hrs  T(C): 39.2 (06 Sep 2023 14:25), Max: 39.2 (06 Sep 2023 14:25)  T(F): 102.6 (06 Sep 2023 14:25), Max: 102.6 (06 Sep 2023 14:25)  HR: 89 (06 Sep 2023 16:00) (62 - 136)  BP: 86/49 (06 Sep 2023 16:00) (77/58 - 157/75)  BP(mean): 62 (06 Sep 2023 16:00) (62 - 116)  RR: 27 (05 Sep 2023 17:30) (27 - 27)  SpO2: 97% (06 Sep 2023 16:00) (71% - 100%)    Parameters below as of 06 Sep 2023 14:10  Patient On (Oxygen Delivery Method): mask, Venturi    O2 Concentration (%): 50                                10.9   7.44  )-----------( 316      ( 05 Sep 2023 06:13 )             32.3         09-05    140  |  109<H>  |  17  ----------------------------<  133<H>  3.8   |  25  |  0.91    Ca    8.3<L>      05 Sep 2023 06:13  Phos  3.2     09-05  Mg     1.9     09-05      Physical exam:   GEN: NAD, appear tired  HEENT: MMM  NECK: Supple, trachea midline  CV: no murmur, S1 S2 heard  RESP: cta b/l, decrease bs at the base,   abd: soft, NTND,  s/p BM 9/6 1x   ext: warm, dry, no edema

## 2023-09-06 NOTE — PROGRESS NOTE ADULT - PROBLEM SELECTOR PLAN 4
likely demand related ischemia with AFIB with RVR with underlying CAD  , now in sinus rhythm trending down troponin normal EF on echo   with recent coronary stent placement   continue BB , plavix ,statin , metoprolol.    Mo
likely demand related ischemia with AFIB with RVR with underlying CAD  , now in sinus rhythm trending down troponin normal EF on echo   with recent coronary stent placement   continue BB , plavix ,statin , metoprolol.
above
likely demand related ischemia with AFIB with RVR with underlying CAD  , now in sinus rhythm trending down troponin normal EF on echo   with recent coronary stent placement   continue BB , plavix ,statin , metoprolol.
likely demand related ischemia with AFIB with RVR with underlying CAD  , now in sinus rhythm trending down troponin normal EF on echo   with recent coronary stent placement   continue BB , plavix ,statin , metoprolol.

## 2023-09-06 NOTE — PROGRESS NOTE ADULT - SUBJECTIVE AND OBJECTIVE BOX
PCP:    REQUESTING PHYSICIAN:    REASON FOR CONSULT:    CHIEF COMPLAINT:    HPI:  HPI:  81 y/o Female with a PMHx of left shoulder pain, stress incontinence, eczema, low back pain, arthritis, glaucoma, chronic rhinitis presents to the ED with complain of palpitations and dyspnea on exertion started yesterday evening  The patient was diagnosed with afib 3 weeks ago and placed on xarelto  , had subsequent cardiac cath for exertional chest tightness , noted LAD disease which was stented last week  , with improvement in symptoms  , patient was not feeling well for last couple of days with chills , fever , loose BM , got better  yesterday  without cough  Patient was in afib yesterday during his exam with her cardiologist , went home , started noticing shortness of breath , on taking different from prior to her stent , decided to come to ER where she was noted to have afib with rapid rate , patient did recieve IVF in ER and as well she drinking salty soups , eating salty crackers at home ,  patient heart rate improved in ER , developed carmela night SOB with desaturation last night , without chest pain , was placed on oxygen  , Patient Blood work showed elevated troponin      9/3/23 Events noted , patient developed shortness of breath with hypoxia  with high febrile episode ,  was having nausea ,  some back pain , temp 104  symptoms improved over some time , no recurrence of SOB while in CCU   CT chest did not show CHF or pneumonia    23 patient had mild degree of sob with febrile episode last night , no chest pain or shortness of breath now   23 Patient is feeling nauseous , mild abdominal discomfort , in afib with rapid rate since this morning ,   no sob   23 Feels well complains of palpitations        PAST MEDICAL & SURGICAL HISTORY:  Chronic rhinitis      Glaucoma (increased eye pressure)      Arthritis      Low back pain      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          SOCIAL HISTORY:    FAMILY HISTORY:  Family history of prostate cancer in father (Father)    Family history of glioblastoma (Mother, Sibling)    Family history of coronary artery disease (Sibling)    Family history of diabetes mellitus (DM) (Sibling)        ALLERGIES:  Allergies    No Known Allergies    Intolerances        MEDICATIONS:    MEDICATIONS  (STANDING):  atorvastatin 20 milliGRAM(s) Oral at bedtime  chlorhexidine 4% Liquid 1 Application(s) Topical <User Schedule>  clopidogrel Tablet 75 milliGRAM(s) Oral daily  digoxin  Injectable 250 MICROGram(s) IV Push once  diltiazem Infusion 5 mG/Hr (5 mL/Hr) IV Continuous <Continuous>  dorzolamide 2%/timolol 0.5% Ophthalmic Solution 1 Drop(s) Both EYES two times a day  hydroxyurea 500 milliGRAM(s) Oral <User Schedule>  loratadine 10 milliGRAM(s) Oral daily  metoprolol tartrate 25 milliGRAM(s) Oral two times a day  piperacillin/tazobactam IVPB.. 3.375 Gram(s) IV Intermittent every 8 hours  rivaroxaban 15 milliGRAM(s) Oral with dinner    MEDICATIONS  (PRN):  acetaminophen     Tablet .. 650 milliGRAM(s) Oral every 6 hours PRN Temp greater or equal to 38C (100.4F), Mild Pain (1 - 3)  aluminum hydroxide/magnesium hydroxide/simethicone Suspension 30 milliLiter(s) Oral every 4 hours PRN Dyspepsia  guaiFENesin Oral Liquid (Sugar-Free) 100 milliGRAM(s) Oral every 6 hours PRN Cough  ondansetron Injectable 4 milliGRAM(s) IV Push every 8 hours PRN Nausea and/or Vomiting      Vital Signs Last 24 Hrs  T(C): 36.1 (06 Sep 2023 05:22), Max: 37.2 (05 Sep 2023 20:00)  T(F): 96.9 (06 Sep 2023 05:22), Max: 99 (05 Sep 2023 20:00)  HR: 81 (06 Sep 2023 08:00) (62 - 116)  BP: 92/76 (06 Sep 2023 08:00) (77/58 - 104/63)  BP(mean): 83 (06 Sep 2023 08:00) (62 - 85)  RR: 27 (05 Sep 2023 17:30) (14 - 35)  SpO2: 100% (06 Sep 2023 08:00) (81% - 100%)    Parameters below as of 06 Sep 2023 08:00  Patient On (Oxygen Delivery Method): nasal cannula  O2 Flow (L/min): 2  Daily     Daily Weight in k.7 (06 Sep 2023 05:22)I&O's Summary    05 Sep 2023 07:01  -  06 Sep 2023 07:00  --------------------------------------------------------  IN: 1532.5 mL / OUT: 2000 mL / NET: -467.5 mL        PHYSICAL EXAM:    Constitutional: NAD, awake and alert, well-developed  HEENT: PERR, EOMI,  No oral cyananosis.  Neck:  supple,  No JVD  Respiratory: Breath sounds are clear bilaterally, No wheezing, rales or rhonchi  Cardiovascular: S1 and S2, irregular  Gastrointestinal: Bowel Sounds present, soft, nontender.   Extremities: No peripheral edema. No clubbing or cyanosis.  Vascular: 2+ peripheral pulses  Neurological: A/O x 3, no focal deficits  Musculoskeletal: no calf tenderness.  Skin: No rashes.      LABS: All Labs Reviewed:                        11.7   5.31  )-----------( 350      ( 06 Sep 2023 07:47 )             36.0                         10.9   7.44  )-----------( 316      ( 05 Sep 2023 06:13 )             32.3                         11.0   8.47  )-----------( 299      ( 04 Sep 2023 05:52 )             32.2     06 Sep 2023 07:47    142    |  110    |  14     ----------------------------<  119    4.0     |  26     |  0.80   05 Sep 2023 06:13    140    |  109    |  17     ----------------------------<  133    3.8     |  25     |  0.91   04 Sep 2023 05:52    141    |  110    |  23     ----------------------------<  138    3.6     |  25     |  0.96     Ca    8.6        06 Sep 2023 07:47  Ca    8.3        05 Sep 2023 06:13  Ca    8.3        04 Sep 2023 05:52  Phos  3.0       06 Sep 2023 07:47  Phos  3.2       05 Sep 2023 06:13  Phos  3.5       04 Sep 2023 05:52  Mg     2.3       06 Sep 2023 07:47  Mg     1.9       05 Sep 2023 06:13  Mg     2.0       04 Sep 2023 05:52            Blood Culture: Organism --  Gram Stain Blood -- Gram Stain --  Specimen Source .Blood None  Culture-Blood --    Organism --  Gram Stain Blood -- Gram Stain --  Specimen Source .Blood None  Culture-Blood --    Organism --  Gram Stain Blood -- Gram Stain --  Specimen Source .Blood None  Culture-Blood --    Organism --  Gram Stain Blood -- Gram Stain --  Specimen Source .Blood None  Culture-Blood --         @ 07:47  TSH: 2.81      RADIOLOGY/EKG:< from: 12 Lead ECG (23 @ 07:42) >  Diagnosis Line Atrial fibrillation with rapid ventricular response  Low voltage QRS  Nonspecific ST abnormality  Abnormal ECG  When compared with ECG of 02-SEP-2023 14:19,  Atrial fibrillation has replaced Sinus rhythm  ST now depressed in Inferior leads  ST now depressed in Anterolateral leads  Nonspecific T wave abnormalitynow evident in Inferior leads  Confirmed by MD BARLOW PAUL ([id number]) on 2023 3:56:39 PM    < end of copied text >        ECHO/CARDIAC CATHTERIZATION/STRESS TEST:  < from: TTE Echo Complete w/o Contrast w/ Doppler (23 @ 08:38) >  Impression     Summary     There is calcification of anterior mitral valve leaflet. The leaflet   opening is normal.   Mild mitral annular calcification is present.   Mild to Moderate mitral regurgitation is present.   EA reversal of the mitral inflow consistent with reduced compliance of   the   left ventricle.   The aortic valve is well visualized, appears mildly calcified. Valve   opening seems to be normal.   Normal appearing tricuspid valve structure.   Moderate (2+) tricuspid valve regurgitation is present.   Moderate pulmonary hypertension.   Normal appearing pulmonic valve structure.   Mild pulmonic valvular regurgitation (1+) is present.   The left atriumis mildly dilated.   Estimated left ventricular ejection fraction is 60-65 %.   The left ventricle is normal in size, wall motion and contractility as   seen in limited views.   Mild concentric left ventricular hypertrophy is present.   The right atrium appears borderline mildly dilated.   Normal appearing right ventricle structure and function.   IVC is dilated and not collapsing with inspiration.     Signature     ----------------------------------------------------------------   Electronicallysigned by Gita Rivero MD(Interpreting   physician) on 2023 06:17 PM    < end of copied text >

## 2023-09-06 NOTE — DIETITIAN INITIAL EVALUATION ADULT - OTHER INFO
83 y/o F with a PMHx of left shoulder pain, stress incontinence, eczema, low back pain, arthritis, glaucoma, chronic rhinitis presented to the ED with complain of palpitations and dyspnea on exertion. The patient was diagnosed with afib 3 weeks ago and placed on Xarelto. Had stents placed 1 week ago with Dr. Toney and placed on Plavix and Metroprolol with improvement of exertional chest pressure. Patient started on have sudden onset of palpitations while having dinner last evening. She was feeling out of breath yesterday evening going up the stairs, also felt chest pressure and tremors. On last Wednesday night, had diarrhea, fever of 101 F, chills, and diaphoresis that subsided yesterday. No other complain now. Patient was found to be in A fib with RVR when she arrived in ED yesterday. She converted to NSR spontaneously. Admitted for chest pain, elevated troponin, and AF. FULL CODE. Upgraded from Tele to CCU for hypoxia. HIDA negative.    Reports a very good appetite/ intake since admission; waiting for breakfast to be delivered. States that she has been consuming at least 75% of her meals. Per CBORD review, pt is ordering well-balanced meals. States that her UBW is 156# which has been a stable weight for her. RD obtained bedscale wt on 9/6 - 158#. No weight hx to review. NFPE reveals mild to moderate muscle/ fat wasting, however do not feel that pt does not meet criteria for PCM at this time. C/w DASH/ TLC diet. Pt declines need for ONS at this time. She states that she has not had a BM in a few days, ordered prunes on her breakfast tray to help promote a BM. Please see additional recommendations below.

## 2023-09-06 NOTE — DIETITIAN INITIAL EVALUATION ADULT - ORAL INTAKE PTA/DIET HISTORY
Reports a good appetite/ intake pta. Normally consumes 3 meals/day and tries to follow a low sodium diet. Does not consume any ONS at home. Pt does the cooking/ shopping.

## 2023-09-06 NOTE — PROGRESS NOTE ADULT - ASSESSMENT
81 y/o F with hx of CAD , recent LAD PCI about 1 wek ago, afib, , thrombocythemia on hydroxyurea, present with palpitation , foudn to be in afib rvr, with fever.  Started diltiazem drip and antibiotic.  initial infectious workup unremarkable, CT showed gallbladder distention, HIDA negative.    thyroid test negative  blood culture repeated 9/6  AC with xarelto  Patient has worsening wheezing 9/6, BNP elevated, concern for cardiac wheeze/fluid overload, started lasix     #afib rvr  #CAD s/p recent PCI   #fever of unknown origin,   #mod pulm HTN   #CHFpEf?    - started diltiazem 30 mg q6h   - continue metorpolol   - wean down dilt drip  - s/p 1 dose of digoxin 9/6  - TTE - EF 60 to 65%, moderate PHTN  - will start lasix 20 mg IV BID 9/6   - continue DAPT  - trop downtrending  - tylenol PRN  - continue antibiotic  - pending repeat infectious workup  - if remains febrile, will consider ID consult  - follow up procal   - continue xarelto   - remain in CCU

## 2023-09-06 NOTE — DIETITIAN INITIAL EVALUATION ADULT - ADD RECOMMEND
1) C/w DASH/ TLC diet  2) Declines need for ONS at this time  3) Obtain vitamin D 25OH level to assess nutriture  4) Please obtain daily weights  5) MVI w/ minerals daily to ensure 100% RDA met  6) Encourage protein-rich foods, maximize food preferences  7) Monitor bowel movements, if no BM for >3 days, consider implementing bowel regimen.  8) Confirm goals of care regarding nutrition support  RD will continue to monitor PO intake, labs, hydration, and wt prn.

## 2023-09-06 NOTE — DIETITIAN INITIAL EVALUATION ADULT - NSFNSGIIOFT_GEN_A_CORE
I&O's Detail    05 Sep 2023 07:01  -  06 Sep 2023 07:00  --------------------------------------------------------  IN:    Diltiazem: 132.5 mL    IV PiggyBack: 500 mL    Lactated Ringers: 900 mL  Total IN: 1532.5 mL    OUT:    Incontinent per Collection Bag (mL): 800 mL    Voided (mL): 1200 mL  Total OUT: 2000 mL    Total NET: -467.5 mL

## 2023-09-06 NOTE — PROGRESS NOTE ADULT - PROBLEM SELECTOR PLAN 1
recurrent episode  predominantly likely from acute febrile illness episode ,  may be component of afib with rapid rate on presentation , however recurrence shortness of breath with high fever  ,   ?sepsis related SOB  , echo showed normal LVEF , moderate TR moderate pulmonary hypertension ,  trending down troponin , remain in sinus rhythm. Less short of breath today     follow up blood cultures , continue IV antibiotics , check BNP  , continue    patient essential thrombocytosis on medication

## 2023-09-07 LAB
ANION GAP SERPL CALC-SCNC: 5 MMOL/L — SIGNIFICANT CHANGE UP (ref 5–17)
BABESIA MICROTI PCR, BLD RESULT: SIGNIFICANT CHANGE UP
BUN SERPL-MCNC: 15 MG/DL — SIGNIFICANT CHANGE UP (ref 7–23)
CALCIUM SERPL-MCNC: 8.4 MG/DL — LOW (ref 8.5–10.1)
CHLORIDE SERPL-SCNC: 107 MMOL/L — SIGNIFICANT CHANGE UP (ref 96–108)
CO2 SERPL-SCNC: 28 MMOL/L — SIGNIFICANT CHANGE UP (ref 22–31)
CREAT SERPL-MCNC: 0.9 MG/DL — SIGNIFICANT CHANGE UP (ref 0.5–1.3)
CRP SERPL-MCNC: 109 MG/L — HIGH
CULTURE RESULTS: SIGNIFICANT CHANGE UP
EGFR: 64 ML/MIN/1.73M2 — SIGNIFICANT CHANGE UP
ERYTHROCYTE [SEDIMENTATION RATE] IN BLOOD: 51 MM/HR — HIGH (ref 0–20)
FERRITIN SERPL-MCNC: 626 NG/ML — HIGH (ref 13–330)
GLUCOSE SERPL-MCNC: 128 MG/DL — HIGH (ref 70–99)
HCT VFR BLD CALC: 35.3 % — SIGNIFICANT CHANGE UP (ref 34.5–45)
HGB BLD-MCNC: 11.6 G/DL — SIGNIFICANT CHANGE UP (ref 11.5–15.5)
LDH SERPL L TO P-CCNC: 183 U/L — SIGNIFICANT CHANGE UP (ref 84–241)
MAGNESIUM SERPL-MCNC: 2.2 MG/DL — SIGNIFICANT CHANGE UP (ref 1.6–2.6)
MCHC RBC-ENTMCNC: 29.8 PG — SIGNIFICANT CHANGE UP (ref 27–34)
MCHC RBC-ENTMCNC: 32.9 GM/DL — SIGNIFICANT CHANGE UP (ref 32–36)
MCV RBC AUTO: 90.7 FL — SIGNIFICANT CHANGE UP (ref 80–100)
PHOSPHATE SERPL-MCNC: 3.6 MG/DL — SIGNIFICANT CHANGE UP (ref 2.5–4.5)
PLATELET # BLD AUTO: 348 K/UL — SIGNIFICANT CHANGE UP (ref 150–400)
POTASSIUM SERPL-MCNC: 3.9 MMOL/L — SIGNIFICANT CHANGE UP (ref 3.5–5.3)
POTASSIUM SERPL-SCNC: 3.9 MMOL/L — SIGNIFICANT CHANGE UP (ref 3.5–5.3)
PROCALCITONIN SERPL-MCNC: 1.69 NG/ML — HIGH (ref 0.02–0.1)
RBC # BLD: 3.89 M/UL — SIGNIFICANT CHANGE UP (ref 3.8–5.2)
RBC # FLD: 15.8 % — HIGH (ref 10.3–14.5)
SODIUM SERPL-SCNC: 140 MMOL/L — SIGNIFICANT CHANGE UP (ref 135–145)
SPECIMEN SOURCE: SIGNIFICANT CHANGE UP
WBC # BLD: 7.4 K/UL — SIGNIFICANT CHANGE UP (ref 3.8–10.5)
WBC # FLD AUTO: 7.4 K/UL — SIGNIFICANT CHANGE UP (ref 3.8–10.5)

## 2023-09-07 PROCEDURE — 99233 SBSQ HOSP IP/OBS HIGH 50: CPT

## 2023-09-07 RX ADMIN — Medication 20 MILLIGRAM(S): at 05:54

## 2023-09-07 RX ADMIN — LORATADINE 10 MILLIGRAM(S): 10 TABLET ORAL at 10:34

## 2023-09-07 RX ADMIN — Medication 30 MILLIGRAM(S): at 17:54

## 2023-09-07 RX ADMIN — PIPERACILLIN AND TAZOBACTAM 25 GRAM(S): 4; .5 INJECTION, POWDER, LYOPHILIZED, FOR SOLUTION INTRAVENOUS at 05:52

## 2023-09-07 RX ADMIN — Medication 30 MILLIGRAM(S): at 10:33

## 2023-09-07 RX ADMIN — Medication 25 MILLIGRAM(S): at 23:55

## 2023-09-07 RX ADMIN — Medication 25 MILLIGRAM(S): at 10:33

## 2023-09-07 RX ADMIN — Medication 30 MILLIGRAM(S): at 23:55

## 2023-09-07 RX ADMIN — CHLORHEXIDINE GLUCONATE 1 APPLICATION(S): 213 SOLUTION TOPICAL at 10:32

## 2023-09-07 RX ADMIN — DORZOLAMIDE HYDROCHLORIDE TIMOLOL MALEATE 1 DROP(S): 20; 5 SOLUTION/ DROPS OPHTHALMIC at 10:32

## 2023-09-07 RX ADMIN — CLOPIDOGREL BISULFATE 75 MILLIGRAM(S): 75 TABLET, FILM COATED ORAL at 10:33

## 2023-09-07 RX ADMIN — Medication 30 MILLIGRAM(S): at 05:54

## 2023-09-07 RX ADMIN — RIVAROXABAN 15 MILLIGRAM(S): KIT at 17:54

## 2023-09-07 RX ADMIN — DORZOLAMIDE HYDROCHLORIDE TIMOLOL MALEATE 1 DROP(S): 20; 5 SOLUTION/ DROPS OPHTHALMIC at 23:55

## 2023-09-07 RX ADMIN — ATORVASTATIN CALCIUM 20 MILLIGRAM(S): 80 TABLET, FILM COATED ORAL at 23:55

## 2023-09-07 NOTE — CONSULT NOTE ADULT - SUBJECTIVE AND OBJECTIVE BOX
Patient is a 82y old  Female who presents with a chief complaint of Palpitation, Elevated troponin, A fib     HPI:  81 y/o Female with h/o left shoulder pain, stress incontinence, eczema, low back pain, arthritis, glaucoma, chronic rhinitis was admitted on  for fever, palpitations and dyspnea on exertion. The patient was diagnosed with A.fib 3 weeks PTA and placed on xarelto. Had cardiac stents placed 1 week ago with Dr. Toney and placed on plavix and metroprolol with improvement of exertional chest pressure. Patient started on have sudden onset of palpitations on the day of admission. She was feeling out of breath yesterday evening going up the stairs, also felt chest pressure and tremors. Also reported diarrhea, fever of 101 F, chills, and diaphoresis the day PTA. No other complain now. In ER, the patient was found to be in A fib with RVR then converted to NSR spontaneously. Alos noted febrile to 104.5F and received zosyn. Over the last several days she has intermittent fever to 102.6F on . Concern about an ongoing infectious process was raised.       PMH: as above  PSH: as above  Meds: per reconciliation sheet, noted below  MEDICATIONS  (STANDING):  acetaminophen   IVPB .. 1000 milliGRAM(s) IV Intermittent once  atorvastatin 20 milliGRAM(s) Oral at bedtime  chlorhexidine 4% Liquid 1 Application(s) Topical <User Schedule>  clopidogrel Tablet 75 milliGRAM(s) Oral daily  diltiazem    Tablet 30 milliGRAM(s) Oral every 6 hours  dorzolamide 2%/timolol 0.5% Ophthalmic Solution 1 Drop(s) Both EYES two times a day  hydroxyurea 500 milliGRAM(s) Oral <User Schedule>  loratadine 10 milliGRAM(s) Oral daily  metoprolol tartrate 25 milliGRAM(s) Oral two times a day  piperacillin/tazobactam IVPB.. 3.375 Gram(s) IV Intermittent every 8 hours  rivaroxaban 15 milliGRAM(s) Oral with dinner    MEDICATIONS  (PRN):  acetaminophen     Tablet .. 650 milliGRAM(s) Oral every 6 hours PRN Temp greater or equal to 38C (100.4F), Mild Pain (1 - 3)  aluminum hydroxide/magnesium hydroxide/simethicone Suspension 30 milliLiter(s) Oral every 4 hours PRN Dyspepsia  guaiFENesin Oral Liquid (Sugar-Free) 100 milliGRAM(s) Oral every 6 hours PRN Cough  ondansetron Injectable 4 milliGRAM(s) IV Push every 8 hours PRN Nausea and/or Vomiting    Allergies    No Known Allergies    Intolerances      Social: no smoking, no alcohol, no illegal drugs; no recent travel, no exposure to TB  FAMILY HISTORY:  Family history of prostate cancer in father (Father)  Family history of glioblastoma (Mother, Sibling)  Family history of coronary artery disease (Sibling)  Family history of diabetes mellitus (DM) (Sibling)    ROS: the patient denies HA, no seizures, no dizziness, no sore throat, no nasal congestion, no blurry vision, no CP, no palpitations, no SOB, no cough, no abdominal pain, no diarrhea, no N/V, no dysuria, no leg pain, no claudication, no rash, no joint aches, no rectal pain or bleeding, no night sweats  All other systems reviewed and are negative    Vital Signs Last 24 Hrs  T(C): 37.2 (07 Sep 2023 04:00), Max: 39.2 (06 Sep 2023 14:25)  T(F): 98.9 (07 Sep 2023 04:00), Max: 102.6 (06 Sep 2023 14:25)  HR: 82 (07 Sep 2023 11:00) (62 - 136)  BP: 83/49 (07 Sep 2023 11:00) (75/49 - 157/75)  BP(mean): 58 (07 Sep 2023 11:00) (58 - 116)  RR: --  SpO2: 79% (07 Sep 2023 11:00) (71% - 100%)    Parameters below as of 07 Sep 2023 09:00  Patient On (Oxygen Delivery Method): nasal cannula      Daily     Daily Weight in k.5 (07 Sep 2023 06:18)    PE:    Constitutional:  No acute distress  HEENT: NC/AT, EOMI, PERRLA, conjunctivae clear; ears and nose atraumatic; pharynx benign  Neck: supple; thyroid not palpable  Back: no tenderness  Respiratory: respiratory effort normal; clear to auscultation  Cardiovascular: S1S2 regular, no murmurs  Abdomen: soft, not tender, not distended, positive BS; no liver or spleen organomegaly  Genitourinary: no suprapubic tenderness  Lymphatic: no LN palpable  Musculoskeletal: no muscle tenderness, no joint swelling or tenderness  Extremities: no pedal edema  Neurological/ Psychiatric: AxOx3, judgement and insight normal; moving all extremities  Skin: no rashes; no palpable lesions    Labs: all available labs reviewed                        11.6   7.40  )-----------( 348      ( 07 Sep 2023 05:33 )             35.3     -    140  |  107  |  15  ----------------------------<  128<H>  3.9   |  28  |  0.90    Ca    8.4<L>      07 Sep 2023 05:33  Phos  3.6       Mg     2.2         TPro  6.6  /  Alb  2.5<L>  /  TBili  0.4  /  DBili  0.1  /  AST  82<H>  /  ALT  85<H>  /  AlkPhos  109       LIVER FUNCTIONS - ( 06 Sep 2023 14:37 )  Alb: 2.5 g/dL / Pro: 6.6 gm/dL / ALK PHOS: 109 U/L / ALT: 85 U/L / AST: 82 U/L / GGT: x           ( @ 22:11)  [facility]    Culture - Blood (collected 02 Sep 2023 15:06)  Source: .Blood None  Preliminary Report (06 Sep 2023 22:00):    No growth at 4 days    Culture - Blood (collected 02 Sep 2023 15:02)  Source: .Blood None  Preliminary Report (06 Sep 2023 22:00):    No growth at 4 days    Culture - Blood (collected 01 Sep 2023 22:32)  Source: .Blood None  Final Report (07 Sep 2023 04:00):    No growth at 5 days    Culture - Blood (collected 01 Sep 2023 22:10)  Source: .Blood None  Final Report (07 Sep 2023 04:00):    No growth at 5 days    Radiology: all available radiological tests reviewed    < from: Xray Chest 1 View- PORTABLE-Urgent (Xray Chest 1 View- PORTABLE-Urgent .) (23 @ 14:09) >  IMPRESSION: No acute finding or change.  < end of copied text >    < from: NM Hepatobiliary Imaging (23 @ 13:11) >  IMPRESSION: Normal hepatobiliary scan.  No scan evidence of acute cholecystitis.  < end of copied text >    < from: CT Chest w/ IV Cont (23 @ 18:22) >  *  No acute septic pathology.  *  No drainable collection.  *  Diffuse nonspecific gallbladder wall edema. Correlate for acute cholecystitis.  < end of copied text >      Advanced directives addressed: full resuscitation

## 2023-09-07 NOTE — PROGRESS NOTE ADULT - ASSESSMENT
81 y/o F with hx of CAD , recent LAD PCI about 1 wek ago, afib, , thrombocythemia on hydroxyurea, present with palpitation , foudn to be in afib rvr, with fever.  Started diltiazem drip and antibiotic.  initial infectious workup unremarkable, CT showed gallbladder distention, HIDA negative.    Thryoid function unremarkable    blood culture repeated 9/6, pending   AC with xarelto  Patient has worsening wheezing 9/6, BNP elevated, concern for cardiac wheeze/fluid overload, started lasix 9/6 , total negative 1 L overnight.  Lasix stopped 9/7  no further wheezes  Possible fever associated with hydroxyurea use?  However procal went up from 0.7 to 1.6, will continue antibiotics,  ID consult placed   Diltiazem dripped was off overnight,  tolerating diltiazem PO 30  mg q6h     #afib rvr  #CAD s/p recent PCI   #fever of unknown origin,   #mod pulm HTN   #CHFpEf?  #infection workup     - appreciate cardiac rec,  diltiazem 30 mg q6h    - continue metorpolol 25 mg PO BID   - s/p 1 dose of digoxin 9/6  - TTE - EF 60 to 65%, moderate PHTN  - s/p lasix 9/6 to 9/7, net negative 1L achieved, lasix is on hold for now   - continue DAPT  - trop downtrended  - tylenol PRN  - continue antibiotic, procal is increasing, will consult ID   - continue xarelto   - possible association of fever with hydroxyurea?  however with elevated procal, will still need to rule out infection.  If infectious workup negative and patient still has persistent fever on hydroxyurea, will consider holding     Sedation/Analgesia: none   Vasoactive medications:  none   DVT prophylaxis:  on xarelto   GI prophylaxis:  none   Nutrition: DASH diet  Central line:  none   Mosher:  purewick for ins and outs  Mobility: PT consulted    GOC: full code       dispo: patient will be downgraded to medicine, tele bed     Valdemar Lewis M.D  Attending Physician  Pulmonary & Critical Care Medicine  [facility]

## 2023-09-07 NOTE — PROGRESS NOTE ADULT - PROBLEM SELECTOR PLAN 2
S/p recent LAD stent; downtrending troponin and no angina; continue medical management with Plavix, atorvastatin.

## 2023-09-07 NOTE — PROGRESS NOTE ADULT - SUBJECTIVE AND OBJECTIVE BOX
REASON FOR VISIT: AF    HPI:  81 y/o woman with a history of CAD, PCI (LAD, 8/2023), PAF, polycythemia admitted on 9/2/23 with fever and recurrence of AF with RVR.    9/3/23 Events noted , patient developed shortness of breath with hypoxia  with high febrile episode ,  was having nausea ,  some back pain , temp 104  symptoms improved over some time , no recurrence of SOB while in CCU   CT chest did not show CHF or pneumonia    9/4/23 patient had mild degree of sob with febrile episode last night , no chest pain or shortness of breath now   9/5/23 Patient is feeling nauseous , mild abdominal discomfort , in afib with rapid rate since this morning ,   no sob   9/6/23 Feels well complains of palpitations  9/7/23:  Fatigue, resolving cough; no pain / dyspnea    MEDICATIONS  (STANDING):  acetaminophen   IVPB .. 1000 milliGRAM(s) IV Intermittent once  atorvastatin 20 milliGRAM(s) Oral at bedtime  chlorhexidine 4% Liquid 1 Application(s) Topical <User Schedule>  clopidogrel Tablet 75 milliGRAM(s) Oral daily  diltiazem    Tablet 30 milliGRAM(s) Oral every 6 hours  diltiazem Infusion 5 mG/Hr (5 mL/Hr) IV Continuous <Continuous>  dorzolamide 2%/timolol 0.5% Ophthalmic Solution 1 Drop(s) Both EYES two times a day  furosemide   Injectable 20 milliGRAM(s) IV Push every 12 hours  hydroxyurea 500 milliGRAM(s) Oral <User Schedule>  loratadine 10 milliGRAM(s) Oral daily  metoprolol tartrate 25 milliGRAM(s) Oral two times a day  piperacillin/tazobactam IVPB.. 3.375 Gram(s) IV Intermittent every 8 hours  rivaroxaban 15 milliGRAM(s) Oral with dinner    MEDICATIONS  (PRN):  acetaminophen     Tablet .. 650 milliGRAM(s) Oral every 6 hours PRN Temp greater or equal to 38C (100.4F), Mild Pain (1 - 3)  aluminum hydroxide/magnesium hydroxide/simethicone Suspension 30 milliLiter(s) Oral every 4 hours PRN Dyspepsia  guaiFENesin Oral Liquid (Sugar-Free) 100 milliGRAM(s) Oral every 6 hours PRN Cough  ondansetron Injectable 4 milliGRAM(s) IV Push every 8 hours PRN Nausea and/or Vomiting    Vital Signs Last 24 Hrs  T(C): 37.2 (07 Sep 2023 04:00), Max: 39.2 (06 Sep 2023 14:25)  T(F): 98.9 (07 Sep 2023 04:00), Max: 102.6 (06 Sep 2023 14:25)  HR: 82 (07 Sep 2023 11:00) (62 - 136)  BP: 83/49 (07 Sep 2023 11:00) (75/49 - 157/75)  BP(mean): 58 (07 Sep 2023 11:00) (58 - 116)  SpO2: 79% (07 Sep 2023 11:00) (71% - 100%)  Patient On (Oxygen Delivery Method): nasal cannula    PHYSICAL EXAM:  Constitutional: NAD, awake and alert, seated in bedside chair  Respiratory: Breath sounds are clear bilaterally, occasional cough  Cardiovascular: S1 and S2, irregular, normal rate  Gastrointestinal: Bowel Sounds present, soft, nontender.   Extremities: No peripheral edema.     LABS:                  11.6   7.40  )-----------( 348      ( 07 Sep 2023 05:33 )             35.3     140  |  107  |  15  ----------------------------<  128<H>  3.9   |  28  |  0.90    Ca    8.4<L>      07 Sep 2023 05:33  Phos  3.6     09-07  Mg     2.2     09-07    TPro  6.6  /  Alb  2.5<L>  /  TBili  0.4  /  DBili  0.1  /  AST  82<H>  /  ALT  85<H>  /  AlkPhos  109  09-06    12 Lead ECG (09.05.23 @ 07:42):   Atrial fibrillation with rapid ventricular response  Low voltage QRS  Nonspecific ST abnormality  Abnormal ECG  When compared with ECG of 02-SEP-2023 14:19,  Atrial fibrillation has replaced Sinus rhythm  ST now depressed in Inferior leads  ST now depressed in Anterolateral leads  Nonspecific T wave abnormalitynow evident in Inferior leads    TTE Echo Complete w/o Contrast w/ Doppler (09.02.23 @ 08:38) >   There is calcification of anterior mitral valve leaflet. The leaflet opening is normal.   Mild mitral annular calcification is present.   Mild to Moderate mitral regurgitation is present.   EA reversal of the mitral inflow consistent with reduced compliance of the left ventricle.   The aortic valve is well visualized, appears mildly calcified. Valve opening seems to be normal.   Normal appearing tricuspid valve structure. Moderate (2+) tricuspid valve regurgitation is present.   Moderate pulmonary hypertension.   Normal appearing pulmonic valve structure. Mild pulmonic valvular regurgitation (1+) is present.   The left atrium is mildly dilated.   Estimated left ventricular ejection fraction is 60-65 %.   The left ventricle is normal in size, wall motion and contractility as seen in limited views.   Mild concentric left ventricular hypertrophy is present.   The right atrium appears borderline mildly dilated.   Normal appearing right ventricle structure and function.   IVC is dilated and not collapsing with inspiration.    Tele: AF

## 2023-09-07 NOTE — PROGRESS NOTE ADULT - SUBJECTIVE AND OBJECTIVE BOX
81 y/o female with recent hospitalization for CAD, underwent LAD PCI, paroxysmal A.fib, thrombocythemia on hydroxyurea   Presented with palpitations and PASCAL at home   Also with fever, diarrhea   She was initially admitted to the floor, had an episode of A.fib with RVR, high fever of 104.5.    9/4 Case discussed on CCU rounds, afebrile for HIDA in am.  9/5 Case discussed on CCU rounds, HIDA- negative, still with low grade temps., and AF RVR.  9/6 - patient had tmax 102F, and patient had worsening afib rvr, cardizem dripped went up to 10, s/p IV tylenol with improvement of her rigors.  s/p blood culture   9/7 - no fever overnight, diltiazem drip off overnight, net negative 1 L.        Vital Signs Last 24 Hrs  T(C): 37.2 (07 Sep 2023 04:00), Max: 39.2 (06 Sep 2023 14:25)  T(F): 98.9 (07 Sep 2023 04:00), Max: 102.6 (06 Sep 2023 14:25)  HR: 82 (07 Sep 2023 11:00) (62 - 136)  BP: 83/49 (07 Sep 2023 11:00) (75/49 - 157/75)  BP(mean): 58 (07 Sep 2023 11:00) (58 - 116)  RR: --  SpO2: 79% (07 Sep 2023 11:00) (71% - 100%)    Parameters below as of 07 Sep 2023 09:00  Patient On (Oxygen Delivery Method): nasal cannula                                10.9   7.44  )-----------( 316      ( 05 Sep 2023 06:13 )             32.3         09-05    140  |  109<H>  |  17  ----------------------------<  133<H>  3.8   |  25  |  0.91    Ca    8.3<L>      05 Sep 2023 06:13  Phos  3.2     09-05  Mg     1.9     09-05      Physical exam:   GEN: NAD, appear tired  HEENT: MMM  NECK: Supple, trachea midline  CV: no murmur, S1 S2 heard  RESP: cta b/l, decrease bs at the base,   abd: soft, NTND,  s/p BM 9/6 1x   ext: warm, dry, no edema

## 2023-09-07 NOTE — PROGRESS NOTE ADULT - TIME BILLING
greater than 50% of time spent reviewing labs, notes, orders and radiographs, coordinating care  discussed with nursing, consultant
greater than 50% of time spent reviewing labs, notes, orders and radiographs, coordinating care  discussed with nursing, consultant
.

## 2023-09-07 NOTE — CONSULT NOTE ADULT - ASSESSMENT
81 y/o Female with h/o left shoulder pain, stress incontinence, eczema, low back pain, arthritis, glaucoma, chronic rhinitis was admitted on 9/2 for fever, palpitations and dyspnea on exertion. The patient was diagnosed with A.fib 3 weeks PTA and placed on xarelto. Had cardiac stents placed 1 week ago with Dr. Toney and placed on plavix and metroprolol with improvement of exertional chest pressure. Patient started on have sudden onset of palpitations on the day of admission. She was feeling out of breath yesterday evening going up the stairs, also felt chest pressure and tremors. Also reported diarrhea, fever of 101 F, chills, and diaphoresis the day PTA. No other complain now. In ER, the patient was found to be in A fib with RVR then converted to NSR spontaneously. Alos noted febrile to 104.5F and received zosyn. Over the last several days she has intermittent fever to 102.6F on 9/6.    1. Febrile syndrome. ?cause ?infectious process. No radiologic evidence of acute cholecystitis. A.fib.   -cultures reviewed   -febrile despite therapy with IV zosyn  -obtain Lyme AB, babesia PCR, ehrlichia PCR, WNV, RMSF PCR  -obtain EBV, CMV, ESR, CRP, LDH       81 y/o Female with h/o left shoulder pain, stress incontinence, eczema, low back pain, arthritis, glaucoma, chronic rhinitis was admitted on 9/2 for fever, palpitations and dyspnea on exertion. The patient was diagnosed with A.fib 3 weeks PTA and placed on xarelto. Had cardiac stents placed 1 week ago with Dr. Toney and placed on plavix and metroprolol with improvement of exertional chest pressure. Patient started on have sudden onset of palpitations on the day of admission. She was feeling out of breath yesterday evening going up the stairs, also felt chest pressure and tremors. Also reported diarrhea, fever of 101 F, chills, and diaphoresis the day PTA. No other complain now. In ER, the patient was found to be in A fib with RVR then converted to NSR spontaneously. Alos noted febrile to 104.5F and received zosyn. Over the last several days she has intermittent fever to 102.6F on 9/6.    1. Febrile syndrome. ?cause ?infectious process. No radiologic evidence of acute cholecystitis. A.fib.   -cultures reviewed   -febrile despite therapy with IV zosyn  -obtain Lyme AB, babesia PCR, ehrlichia PCR, WNV, RMSF PCR  -obtain EBV, CMV, ESR, CRP, LDH  -repeat BC x 2, urine c/s  -would hold off further abx therapy  -old chart reviewed to assess prior cultures  -monitor temps  -f/u CBC  -supportive care  2. Other issues:   -care per medicine    d/w Dr. Lewis

## 2023-09-08 ENCOUNTER — APPOINTMENT (OUTPATIENT)
Dept: CARDIOLOGY | Facility: CLINIC | Age: 83
End: 2023-09-08

## 2023-09-08 LAB
ANION GAP SERPL CALC-SCNC: 5 MMOL/L — SIGNIFICANT CHANGE UP (ref 5–17)
B BURGDOR C6 AB SER-ACNC: NEGATIVE — SIGNIFICANT CHANGE UP
B BURGDOR IGG+IGM SER-ACNC: 0.09 INDEX — SIGNIFICANT CHANGE UP (ref 0.01–0.89)
BUN SERPL-MCNC: 16 MG/DL — SIGNIFICANT CHANGE UP (ref 7–23)
CALCIUM SERPL-MCNC: 8.7 MG/DL — SIGNIFICANT CHANGE UP (ref 8.5–10.1)
CHLORIDE SERPL-SCNC: 106 MMOL/L — SIGNIFICANT CHANGE UP (ref 96–108)
CMV IGG FLD QL: <0.2 U/ML — SIGNIFICANT CHANGE UP
CMV IGG SERPL-IMP: NEGATIVE — SIGNIFICANT CHANGE UP
CMV IGM FLD-ACNC: <8 AU/ML — SIGNIFICANT CHANGE UP
CMV IGM SERPL QL: NEGATIVE — SIGNIFICANT CHANGE UP
CO2 SERPL-SCNC: 28 MMOL/L — SIGNIFICANT CHANGE UP (ref 22–31)
CREAT SERPL-MCNC: 0.91 MG/DL — SIGNIFICANT CHANGE UP (ref 0.5–1.3)
EBV EA AB SER IA-ACNC: 15.9 U/ML — HIGH
EBV EA AB TITR SER IF: POSITIVE
EBV EA IGG SER-ACNC: POSITIVE
EBV NA IGG SER IA-ACNC: >600 U/ML — HIGH
EBV PATRN SPEC IB-IMP: SIGNIFICANT CHANGE UP
EBV VCA IGG AVIDITY SER QL IA: POSITIVE
EBV VCA IGM SER IA-ACNC: 100 U/ML — HIGH
EBV VCA IGM SER IA-ACNC: 660 U/ML — HIGH
EBV VCA IGM TITR FLD: POSITIVE
EGFR: 63 ML/MIN/1.73M2 — SIGNIFICANT CHANGE UP
GLUCOSE SERPL-MCNC: 113 MG/DL — HIGH (ref 70–99)
HCT VFR BLD CALC: 35.3 % — SIGNIFICANT CHANGE UP (ref 34.5–45)
HGB BLD-MCNC: 11.6 G/DL — SIGNIFICANT CHANGE UP (ref 11.5–15.5)
MAGNESIUM SERPL-MCNC: 2.2 MG/DL — SIGNIFICANT CHANGE UP (ref 1.6–2.6)
MCHC RBC-ENTMCNC: 30.1 PG — SIGNIFICANT CHANGE UP (ref 27–34)
MCHC RBC-ENTMCNC: 32.9 GM/DL — SIGNIFICANT CHANGE UP (ref 32–36)
MCV RBC AUTO: 91.7 FL — SIGNIFICANT CHANGE UP (ref 80–100)
PHOSPHATE SERPL-MCNC: 3.3 MG/DL — SIGNIFICANT CHANGE UP (ref 2.5–4.5)
PLATELET # BLD AUTO: 368 K/UL — SIGNIFICANT CHANGE UP (ref 150–400)
POTASSIUM SERPL-MCNC: 3.9 MMOL/L — SIGNIFICANT CHANGE UP (ref 3.5–5.3)
POTASSIUM SERPL-SCNC: 3.9 MMOL/L — SIGNIFICANT CHANGE UP (ref 3.5–5.3)
RBC # BLD: 3.85 M/UL — SIGNIFICANT CHANGE UP (ref 3.8–5.2)
RBC # FLD: 15.8 % — HIGH (ref 10.3–14.5)
SODIUM SERPL-SCNC: 139 MMOL/L — SIGNIFICANT CHANGE UP (ref 135–145)
WBC # BLD: 7.43 K/UL — SIGNIFICANT CHANGE UP (ref 3.8–10.5)
WBC # FLD AUTO: 7.43 K/UL — SIGNIFICANT CHANGE UP (ref 3.8–10.5)

## 2023-09-08 PROCEDURE — 99232 SBSQ HOSP IP/OBS MODERATE 35: CPT

## 2023-09-08 PROCEDURE — 71045 X-RAY EXAM CHEST 1 VIEW: CPT | Mod: 26

## 2023-09-08 PROCEDURE — 99233 SBSQ HOSP IP/OBS HIGH 50: CPT

## 2023-09-08 RX ORDER — DILTIAZEM HCL 120 MG
5 CAPSULE, EXT RELEASE 24 HR ORAL ONCE
Refills: 0 | Status: COMPLETED | OUTPATIENT
Start: 2023-09-08 | End: 2023-09-08

## 2023-09-08 RX ORDER — FUROSEMIDE 40 MG
40 TABLET ORAL ONCE
Refills: 0 | Status: COMPLETED | OUTPATIENT
Start: 2023-09-08 | End: 2023-09-08

## 2023-09-08 RX ORDER — DILTIAZEM HCL 120 MG
10 CAPSULE, EXT RELEASE 24 HR ORAL ONCE
Refills: 0 | Status: COMPLETED | OUTPATIENT
Start: 2023-09-08 | End: 2023-09-08

## 2023-09-08 RX ADMIN — DORZOLAMIDE HYDROCHLORIDE TIMOLOL MALEATE 1 DROP(S): 20; 5 SOLUTION/ DROPS OPHTHALMIC at 20:45

## 2023-09-08 RX ADMIN — CHLORHEXIDINE GLUCONATE 1 APPLICATION(S): 213 SOLUTION TOPICAL at 09:53

## 2023-09-08 RX ADMIN — CLOPIDOGREL BISULFATE 75 MILLIGRAM(S): 75 TABLET, FILM COATED ORAL at 09:54

## 2023-09-08 RX ADMIN — DORZOLAMIDE HYDROCHLORIDE TIMOLOL MALEATE 1 DROP(S): 20; 5 SOLUTION/ DROPS OPHTHALMIC at 09:53

## 2023-09-08 RX ADMIN — Medication 25 MILLIGRAM(S): at 20:41

## 2023-09-08 RX ADMIN — ATORVASTATIN CALCIUM 20 MILLIGRAM(S): 80 TABLET, FILM COATED ORAL at 20:41

## 2023-09-08 RX ADMIN — Medication 25 MILLIGRAM(S): at 09:54

## 2023-09-08 RX ADMIN — Medication 30 MILLIGRAM(S): at 23:55

## 2023-09-08 RX ADMIN — Medication 5 MILLIGRAM(S): at 18:43

## 2023-09-08 RX ADMIN — LORATADINE 10 MILLIGRAM(S): 10 TABLET ORAL at 09:54

## 2023-09-08 RX ADMIN — HYDROXYUREA 500 MILLIGRAM(S): 500 CAPSULE ORAL at 12:11

## 2023-09-08 RX ADMIN — Medication 10 MILLIGRAM(S): at 22:31

## 2023-09-08 RX ADMIN — Medication 40 MILLIGRAM(S): at 20:41

## 2023-09-08 RX ADMIN — Medication 100 MILLIGRAM(S): at 14:17

## 2023-09-08 RX ADMIN — Medication 30 MILLIGRAM(S): at 09:55

## 2023-09-08 RX ADMIN — Medication 650 MILLIGRAM(S): at 17:56

## 2023-09-08 RX ADMIN — RIVAROXABAN 15 MILLIGRAM(S): KIT at 17:07

## 2023-09-08 RX ADMIN — Medication 30 MILLIGRAM(S): at 06:51

## 2023-09-08 RX ADMIN — Medication 30 MILLIGRAM(S): at 17:07

## 2023-09-08 NOTE — PROGRESS NOTE ADULT - PROBLEM SELECTOR PLAN 2
Single vessel CAD (80% pLAD) s/p NELSON 8/23/23; previously seen elevation of troponin has markedly improved;   continue Plavix, atorvastatin.

## 2023-09-08 NOTE — PHYSICAL THERAPY INITIAL EVALUATION ADULT - MODALITIES TREATMENT COMMENTS
pt overall appears weak, deconditioned. pt left sitting in BS chair, CBIR, NAD, monitors intact. nsg notified

## 2023-09-08 NOTE — PHYSICAL THERAPY INITIAL EVALUATION ADULT - AMBULATION SKILLS, REHAB EVAL
History Main Topics    Smoking status: Not on file    Smokeless tobacco: Not on file    Alcohol use Not on file    Drug use: Unknown    Sexual activity: Not on file     Other Topics Concern    Not on file     Social History Narrative    No narrative on file          Do you live alone: [x]  Alone []  with spouse/family       Do you have stairs in your home  []  no [x]  yes        Comment: 12 stairs to basement laundry       Transportation  [x]  drive self (during day only) []  family/friend     Comment:       Cultural/Spiritual Influences: (Zoroastrian groups, etc)       Any Cultural or Religion practices we should be aware of? Fall Risk Assessment:     []  Cognitive Impairment []  Balance/Gait Disturbance   []  Fall History   []  Visual Difficulty   []  Dizziness   []  Other Comments    Physical Assessment:    Color: [x]  natural []  pale [] cyanotic []  flushed []  jaundice    Skin Integrity [x]  intact [] other:    Heart Rate 69  Resp Rate 20      Breath Sounds: CTA    Blood Pressures Sitting: Rt arm 128/60  Left arm: 126/60       Standing Rt arm 118/60  Left arm: 122/64    Resting SpO2: 96  Resp effort/pattern: Easy/Regular      Peripheral pulses: Yes    Edema:  No    Numbness/tingling:  No    Orthopedic/exercise limitations:  No      Psychosocial assessment:    Support System:[employer]    Physical/Behavioral signs of abuse/neglect:No    Advance Directives:  Yes  [] info given    Learning Preferences: Primary Language:English        Preferred method of learning []  video []  handouts        [] listening/lecture   [x]  all    Memory impairment?   No     EXERCISE  Initial Assessment  Day 1 EXERCISE  Intervention  Day 2-30 EXERCISE  Re-Assessment  Day 31-60 EXERCISE  Re-Assessment  Day 61-90+ EXERCISE  Last Day   Date:   03/06/2018 Date:   04/03/2018 Date:  05/03/2018  Date:  Date:           Pre Rehab  6 MWT  1000 ft = 68% pred (reduced)  2.5 METs  SpO2: 80%    1.9  MPH   HR: 94bpm      RPD: 9, Flexibility education offered  [x] Attended Benefits of Exercise Class  [x]  Attended Resistance Training Class                                                    GOALS                                                                                           Rate your physical   fitness (PF)?:   /10    -30 day PF goal:  /10    EDUCATION  [] Attended all individually relevant exercise education sessions? []  Knows current exercise goals and recmndtns?:                                                  Goals Achieved? Rate your physical fitness now?:     /10  Handgrip:  Right:  Left:    Discharge  EDUCATION  []  Attended all individually relevant exercise education sessions?    [] Knows current exercise goals and recmndtns?:                                                                                        PHYSICIAN DIRECTED   EXERCISE RX     RPE : 11 - 14  Titrate O2 to keep SpO2 >89%    Frequency (F): 2-3x/wk in Rehab,         Initial Intensity : 2.5 METs (from 6MWT)   1.5--2 (60-80% of walk speed for TM)    Type (T): Aerobic (TM,NS, SF, AE, AB, RB, EL, Arc), RT 1-3#, 8-16 reps    CAN USE ALL EQUIPMENT EXCEPT       Time (Ti): Aerobic:   15-40 min               Progression: 1-2 min total time for TM, AB, NS, SF or Arm ergometer per session or when a steady state has occurred in RPE if  SpO2 and HR levels are acceptable           PHYSICIAN DIRECTED   EXERCISE RX       Titrate O2 to keep SpO2 >89%    Frequency (F): 2-3x/wk in Rehab, 1-3x/wk home walking       Intensity (I):  Initial + 5-10% increase each week or when a steady state has occurred in RPE if  SpO2 and HR levels are acceptable  Type (T)  Aerobic (TM,NS, SFR,SFS, AE, AB, RB), RT   8-16 reps    CAN USE ALL EQUIPMENT EXCEPT        Time (Ti): Aerobic:   30-40 min        Progression:   1-2 min total time for TM, AB, NS, SF or Arm ergometer per session or when a steady independent O2 safety guidelines           Medications  []  Uses as prescribed  []  Non- compliant with prescribed meds    Respiratory Medications    []  Proper use and technique of MDI, DPI and/or nebs  []  Incorrect use and technique of MDI, DPI and /or nebs    Hypoxemia  Problem:      Current Oxygen Prescription:    l/min rest   l/min exercise   titration plan/weaning plan:    Goals:   []  Manage Hypoxemia  []  receive adequate portable system  [] Compliant with use as prescribed  []  Learn O2 safety guidelines     Medications    [] Compliant  []  Noncompliant       Respiratory Medications    []  Compliant  [] Noncompliant              Hypoxemia  Problem:    []  Compliant  []  Noncompliant    Final Oxygen Prescription:    l/min rest   l/min exercise                                                           [facility] INDIVIDUAL TREATMENT PLAN  NUTRITION DOMAIN:        NUTRITION   Initial Assessment  Day 1 NUTRITION   Intervention  Day 2-30 NUTRITION   Re-Assessment  Day 31-60 NUTRITION   Re-Assessment Day 61-90+ NUTRITION Discharge  Final Day   Weight Management:  92.2 lbs  Current BMI 19.3 Weight Management:   93.6lbs  Current BMI Weight Management:   95.1 lbs  Current BMI Weight Management:    lbs  Current BMI Weight Management:    lbs  Current BMI   Diabetes?: No  A1C   Fasting BS:   Insulin?:    Oral agent? Diabetes:  If y, has patient seen a positive trend in FBS?:      Intervention    [x] Basic nutrition education   [] Referral to Diabetes Education? [] Referral to weightloss/nutrition education     Intervention    [x] Pt has had Nutrition class? [x] Informal questions asked regarding nutrition/weight control Intervention    []  Pt has had Nutrition class? [] Questions addressed Intervention    []  Pt has had Nutrition class?    Intervention    Pt aware of:     [] Pulmonary eating techniques   [] Smart choices, Calories   []Gas-producing foods   [] Wt gain / loss strategies     GOALS    Weight Loss         30 DAY TARGET GOAL:    [] Decrease portion size by 250-500 calories/day  [] Increase fluid intake to 6-8 8oz.  [] Eat less sweets      Reasonable, achievable 30 day weight goal:  lbs   (1-2 lb per week is recommended)            Weight Gain        30 day   Target Goal:    [] increase proteins  [] add nutrition  Supplement  [] 6 small meals      Did pt meet Initial 30 day Target Goal(s)?:     New 30 DAY TARGET GOAL:    [] Decrease saturated fats  [] Decrease gas producing  cruciferous veggies   -  Reasonable, achievable 30 day weight goal:                                  Discussed adding more protein and fiber to diet Did pt meet previous 30 day Target   Goal(s)?:     New 30 DAY TARGET GOAL:    [] Switch to whole grains whenever possible  [] Decrease/ Eliminate carbonated beverages    Reasonable, achievable 30 day weight goal: Maintain or increase to 98#   Did pt meet previous 30 day Target   Goal(s)?:      New 30 DAY TARGET GOAL:    [] Smaller meals more frequently  [] Decrease portion sizes by 25%      Reasonable, achievable 30 day weight goal:                  Did pt meet previous 30 day Target   Goal(s)?:                         [facility] INDIVIDUAL TREATMENT PLAN  PSYCHOSOCIAL DOMAIN:    Psychosocial   Initial Assessment  Day 1 Psychosocial   Intervention  Day 2-30 Psychosocial  Re-Assessment  Day 31-60 Psychosocial   Re-Assessment  Day 61-90+ Psychosocial Discharge  Final Day   Psychosocial Screening Tools    (X) PHQ-9 score: 5      (X) SF-36: 84       (X) UCSD SOB score: 47         PHQ-9 Score: If score >or =15, Action:     SF-36 Mental Score:     UCSD Score: Any action on Screening Tools?:                  Psychosocial Screening   Tools    Repeat PHQ-9 Score if mariluz:    Repeat SF-36      Repeat UCSD SOB Score:       Assessment  []  S/S of depression identified?     []  PCP notified of PHQ-9 results      []  On Anti-anxiety / anti-depression meds?: 0           Intervention  If S/S of depression present, action taken:     Is the patient receiving support for the SC program at home?:  []  Y    [x] N- pt lives alone and has limited contacts    Is the patient tolerating the intensity and duration of the SC program?:     [x] Y   [] N Re-Assessment  [] S/S of depression present? If [x] , action:         Med Changes?:   [] Y   [] N        Is the patient tolerating the intensity and duration of the SC program?:    [x]  Y    [] N  Re-Assessment  Psych Issue notes:        Is the patient receiving support for the SC program at home?:  []  Y  [] N    Is the patient tolerating the intensity and duration of the SC program?:  []  Y   []  N     Final Assessment                       Results of any Physician/ Counselor Intervention?:     [] Y   [] N         GOALS        30 DAY TARGET GOAL    [x]  Assess Mental Score using   SF-36 and PHQ-9   [x]  Teach PLB  [x]  Reassure pt that (s)he can stop and rest, adjust the speeds, and/or switch modalities from our suggestions  -    (0 being 'None', 10 being 'Very'),  -How would you rate your Anxiety Level: 2      -How would you rate your level of  depression: 2    -How would you rate your mood: (0 is negative all the time, 10 is positive all the time):  8          ADL's  Assess PF score on SF-36    Score= 25    From [facility], ADL pt struggles with most: Vacuuming    Out of 10, rate your ability to do that ADL now.   3     GOALS        Did pt meet Initial 30 day   Target Goal(s)?:       30 DAY NEW TARGET GOAL    [] Decrease/Maintain anxiety and depression level  [] Increase ability to complete ADL  [] Encourage pt to rate exercises truthfully to encourage correct intensity / duration prescription  -  (0 being 'None', 10 being 'Very'),  -How would you rate your Anxiety Level:2      -How would you rate your level of depression: 2      -How would you rate your mood:8                          ADL- 5 GOALS        Did pt meet previous 30 day Target Goal(s)?:      RE- ASSESSMENT GOAL    [] Albuterol MDI 2 puffs q 4hrs prn                        3. Duonebs HHN q 4hrs prn  Nutrition:    Psychosocial including Dyspnea with ADL's, Depression/Anxiety and Social Functioning: Fran Vázquez lives at home with her daughters. She tries to be active with daily chores, shopping and outings with daughters and friends but has been unable to keep up due to her increased shortness of breath and fatigue. This has been worse since her hospitalization in December 2017. She admits to occasionally having mild bouts of anxiety and depression, rating both a 3/10, but states she quickly \"gets over it\". She rates her mood 8/10.     30 Day Progress Report 4/3/2018  Attendance: Tiki De La Rosa has attended 8 exercise sessions and 2 education classes during Pulmonary Rehab. Exercise: She has exercised 20-40 minutes at low-moderate levels. Oxygen:Exercises on room air. Medications: No changes  Nutrition: Wt stable at 93.6lbs. She has attended part 1&2 of general nutrition class. Psychosocial: Reports no changes in her levels of anxiety and depression and her mood remains mostly positive. Manjinder Carrasco RN    60 Day Progress Report 5/3/2018  Attendance: Tiki De La Rosa has attended 16 exercise sessions and 6 education classes to date. Exercise: She has exercised 30-40 minutes at low to moderate levels  Oxygen: she exercises on room air and maintains SpO2 of 96-98%  Nutrition: Wt stable at 93#  Psychosocial: Reports her anxiety and depression have gone from 2 to 0 and her mood is positive 8/10. Michael ROBLEDO             Referring Physician: Dr. Raymond Sinclair                                              Fax #:[phone]    Reviewed and electronically signed by Dr Mina Landry, Medical Director

## 2023-09-08 NOTE — PROGRESS NOTE ADULT - PROBLEM SELECTOR PLAN 1
55 yo male with a PMH of ESRD with dialysis MWF (last on weds), DM, presenting as transfer from [facility] as admission to neurosurgery for evaluation after progressive course of unsteady gait as well as bilateral upper extremity weakness for the last several months.     ESRD on HD with Dr. Araceli Pickens in [facility] MWF, 4.5 hours, EDW 140kg  Last HD 2/27/19 @L removed no available current DW    Plan/Assessment:   -Will plan to hold RRT today, will re assess later.   -Remain on Levophed for BP support, MAP >75 per primary team (last day per note)  -Continue strict I/Os        Remains in AF -- rate is controlled; continue diltiazem, metoprolol as BP allows (has been low); continue anticoagulation w/ Xarelto.    * I discussed case with Dr Lewis (ICU attending)

## 2023-09-08 NOTE — PHYSICAL THERAPY INITIAL EVALUATION ADULT - GAIT TRAINING, PT EVAL
Severe protein-calorie malnutrition
3. (1-2wk)  pt will amb indep w/ RW 200ft; 4. (2wk) pt will be able to perform FOS w/ rail and sup.

## 2023-09-08 NOTE — PROGRESS NOTE ADULT - SUBJECTIVE AND OBJECTIVE BOX
Date of service: 09-08-23 @ 11:48    Lying in bed in NAD  Fever is down  No rash    ROS: denies dizziness, no HA, no SOB or cough, no abdominal pain, no diarrhea or constipation; no dysuria, no legs pain, no rashes    MEDICATIONS  (STANDING):  acetaminophen   IVPB .. 1000 milliGRAM(s) IV Intermittent once  atorvastatin 20 milliGRAM(s) Oral at bedtime  chlorhexidine 4% Liquid 1 Application(s) Topical <User Schedule>  clopidogrel Tablet 75 milliGRAM(s) Oral daily  diltiazem    Tablet 30 milliGRAM(s) Oral every 6 hours  dorzolamide 2%/timolol 0.5% Ophthalmic Solution 1 Drop(s) Both EYES two times a day  hydroxyurea 500 milliGRAM(s) Oral <User Schedule>  loratadine 10 milliGRAM(s) Oral daily  metoprolol tartrate 25 milliGRAM(s) Oral two times a day  rivaroxaban 15 milliGRAM(s) Oral with dinner    Vital Signs Last 24 Hrs  T(C): 36.2 (08 Sep 2023 06:08), Max: 36.3 (07 Sep 2023 17:57)  T(F): 97.2 (08 Sep 2023 06:08), Max: 97.3 (07 Sep 2023 17:57)  HR: 65 (08 Sep 2023 11:00) (65 - 112)  BP: 87/37 (08 Sep 2023 11:00) (82/64 - 119/67)  BP(mean): 54 (08 Sep 2023 11:00) (54 - 93)  RR: --  SpO2: 92% (08 Sep 2023 11:00) (79% - 100%)     Physical exam:    Constitutional:  No acute distress  HEENT: NC/AT, EOMI, PERRLA, conjunctivae clear; ears and nose atraumatic  Neck: supple; thyroid not palpable  Back: no tenderness  Respiratory: respiratory effort normal; clear to auscultation  Cardiovascular: S1S2 regular, no murmurs  Abdomen: soft, not tender, not distended, positive BS  Genitourinary: no suprapubic tenderness  Lymphatic: no LN palpable  Musculoskeletal: no muscle tenderness, no joint swelling or tenderness  Extremities: no pedal edema  Neurological/ Psychiatric: AxOx3, moving all extremities  Skin: no rashes; no palpable lesions    Labs: all available labs reviewed                        11.6   7.40  )-----------( 348      ( 07 Sep 2023 05:33 )             35.3     09-07    140  |  107  |  15  ----------------------------<  128<H>  3.9   |  28  |  0.90    Ca    8.4<L>      07 Sep 2023 05:33  Phos  3.6     09-07  Mg     2.2     09-07    TPro  6.6  /  Alb  2.5<L>  /  TBili  0.4  /  DBili  0.1  /  AST  82<H>  /  ALT  85<H>  /  AlkPhos  109  09-06     LIVER FUNCTIONS - ( 06 Sep 2023 14:37 )  Alb: 2.5 g/dL / Pro: 6.6 gm/dL / ALK PHOS: 109 U/L / ALT: 85 U/L / AST: 82 U/L / GGT: x           (09-01 @ 22:11)  [facility]    Culture - Blood (collected 02 Sep 2023 15:06)  Source: .Blood None  Preliminary Report (06 Sep 2023 22:00):    No growth at 4 days    Culture - Blood (collected 02 Sep 2023 15:02)  Source: .Blood None  Preliminary Report (06 Sep 2023 22:00):    No growth at 4 days    Culture - Blood (collected 01 Sep 2023 22:32)  Source: .Blood None  Final Report (07 Sep 2023 04:00):    No growth at 5 days    Culture - Blood (collected 01 Sep 2023 22:10)  Source: .Blood None  Final Report (07 Sep 2023 04:00):    No growth at 5 days    Radiology: all available radiological tests reviewed    < from: Xray Chest 1 View- PORTABLE-Urgent (Xray Chest 1 View- PORTABLE-Urgent .) (09.06.23 @ 14:09) >  IMPRESSION: No acute finding or change.  < end of copied text >    < from: NM Hepatobiliary Imaging (09.05.23 @ 13:11) >  IMPRESSION: Normal hepatobiliary scan.  No scan evidence of acute cholecystitis.  < end of copied text >    < from: CT Chest w/ IV Cont (09.02.23 @ 18:22) >  *  No acute septic pathology.  *  No drainable collection.  *  Diffuse nonspecific gallbladder wall edema. Correlate for acute cholecystitis.  < end of copied text >      Advanced directives addressed: full resuscitation

## 2023-09-08 NOTE — PROGRESS NOTE ADULT - ASSESSMENT
83 y/o Female with h/o left shoulder pain, stress incontinence, eczema, low back pain, arthritis, glaucoma, chronic rhinitis was admitted on 9/2 for fever, palpitations and dyspnea on exertion. The patient was diagnosed with A.fib 3 weeks PTA and placed on xarelto. Had cardiac stents placed 1 week ago with Dr. Toney and placed on plavix and metroprolol with improvement of exertional chest pressure. Patient started on have sudden onset of palpitations on the day of admission. She was feeling out of breath yesterday evening going up the stairs, also felt chest pressure and tremors. Also reported diarrhea, fever of 101 F, chills, and diaphoresis the day PTA. No other complain now. In ER, the patient was found to be in A fib with RVR then converted to NSR spontaneously. Alos noted febrile to 104.5F and received zosyn. Over the last several days she has intermittent fever to 102.6F on 9/6.    1. Febrile syndrome improving. Acute infectious mononucleosis. No radiologic evidence of acute cholecystitis. A.fib.   -cultures reviewed   -fever is down  -Lyme AB, babesia PCR are negative  -serology for EBV indicates acute infection  -BC x 2, urine c/s noted  -no need for abx therapy   -monitor temps  -f/u CBC  -supportive care  2. Other issues:   -care per medicine    d/w Dr. Lewis

## 2023-09-08 NOTE — PROGRESS NOTE ADULT - SUBJECTIVE AND OBJECTIVE BOX
REASON FOR VISIT: AF    HPI:  83 y/o woman with a history of CAD, PCI (LAD, 8/2023), PAF, polycythemia admitted on 9/2/23 with fever and recurrence of AF with RVR.    9/3/23 Events noted , patient developed shortness of breath with hypoxia  with high febrile episode ,  was having nausea ,  some back pain , temp 104  symptoms improved over some time , no recurrence of SOB while in CCU   CT chest did not show CHF or pneumonia    9/4/23 patient had mild degree of sob with febrile episode last night , no chest pain or shortness of breath now   9/5/23 Patient is feeling nauseous , mild abdominal discomfort , in afib with rapid rate since this morning ,   no sob   9/6/23 Feels well complains of palpitations  9/7/23:  Fatigue, resolving cough; no pain / dyspnea  9/8/23:  No new complaints; slept well; no palpitations or dyspnea; remains afebrile.    MEDICATIONS  (STANDING):  acetaminophen   IVPB 1000 milliGRAM(s) IV Intermittent once  atorvastatin 20 milliGRAM(s) Oral at bedtime  chlorhexidine 4% Liquid 1 Application(s) Topical <User Schedule>  clopidogrel Tablet 75 milliGRAM(s) Oral daily  diltiazem    Tablet 30 milliGRAM(s) Oral every 6 hours  dorzolamide 2%/timolol 0.5% Ophthalmic Solution 1 Drop(s) Both EYES two times a day  hydroxyurea 500 milliGRAM(s) Oral <User Schedule>  loratadine 10 milliGRAM(s) Oral daily  metoprolol tartrate 25 milliGRAM(s) Oral two times a day  rivaroxaban 15 milliGRAM(s) Oral with dinner    MEDICATIONS  (PRN):  acetaminophen     Tablet .. 650 milliGRAM(s) Oral every 6 hours PRN Temp greater or equal to 38C (100.4F), Mild Pain (1 - 3)  aluminum hydroxide/magnesium hydroxide/simethicone Suspension 30 milliLiter(s) Oral every 4 hours PRN Dyspepsia  guaiFENesin Oral Liquid (Sugar-Free) 100 milliGRAM(s) Oral every 6 hours PRN Cough  ondansetron Injectable 4 milliGRAM(s) IV Push every 8 hours PRN Nausea and/or Vomiting    Vital Signs Last 24 Hrs  T(C): 36.2 (08 Sep 2023 06:08), Max: 36.3 (07 Sep 2023 17:57)  T(F): 97.2 (08 Sep 2023 06:08), Max: 97.3 (07 Sep 2023 17:57)  HR: 79 (08 Sep 2023 08:00) (65 - 112)  BP: 94/67 (08 Sep 2023 08:00) (82/64 - 119/67)    PHYSICAL EXAM:  Constitutional: NAD, awake and alert, semirecumbent in bed  Respiratory: Breath sounds are clear bilaterally  Cardiovascular: S1 and S2, irregular, normal rate  Gastrointestinal: Bowel Sounds present, soft, nontender.   Extremities: No edema.     LABS:                       11.6   7.43  )-----------( 368      ( 08 Sep 2023 05:34 )             35.3     139  |  106  |  16  ----------------------------<  113<H>  3.9   |  28  |  0.91    Ca    8.7      08 Sep 2023 05:34  Phos  3.3     09-08  Mg     2.2     09-08    TPro  6.6  /  Alb  2.5<L>  /  TBili  0.4  /  DBili  0.1  /  AST  82<H>  /  ALT  85<H>  /  AlkPhos  109  09-06    TroponinI hsT: 105.32, 655.63, 1045.45, 1472.44, 1832.57         12 Lead ECG (09.05.23 @ 07:42):   Atrial fibrillation with rapid ventricular response  Low voltage QRS  Nonspecific ST abnormality  Abnormal ECG  When compared with ECG of 02-SEP-2023 14:19,  Atrial fibrillation has replaced Sinus rhythm  ST now depressed in Inferior leads  ST now depressed in Anterolateral leads  Nonspecific T wave abnormality now evident in Inferior leads    TTE Echo Complete w/o Contrast w/ Doppler (09.02.23 @ 08:38) >   Mild to Moderate mitral regurgitation.   EA reversal of the mitral inflow consistent with reduced compliance of the left ventricle.   The aortic valve is well visualized, appears mildly calcified. Valve opening seems to be normal.   Normal appearing tricuspid valve structure. Moderate (2+) tricuspid valve regurgitation.   Moderate pulmonary hypertension.   Normal appearing pulmonic valve structure. Mild pulmonic valvular regurgitation.   The left atrium is mildly dilated.   Estimated left ventricular ejection fraction is 60-65 %.   The left ventricle is normal in size, wall motion and contractility as seen in limited views.   Mild concentric left ventricular hypertrophy.   The right atrium appears borderline mildly dilated.   Normal appearing right ventricle structure and function.   IVC is dilated and not collapsing with inspiration.    Tele: AF

## 2023-09-08 NOTE — PROGRESS NOTE ADULT - SUBJECTIVE AND OBJECTIVE BOX
83 y/o female with recent hospitalization for CAD, underwent LAD PCI, paroxysmal A.fib, thrombocythemia on hydroxyurea   Presented with palpitations and PASCAL at home   Also with fever, diarrhea   She was initially admitted to the floor, had an episode of A.fib with RVR, high fever of 104.5.    9/4 Case discussed on CCU rounds, afebrile for HIDA in am.  9/5 Case discussed on CCU rounds, HIDA- negative, still with low grade temps., and AF RVR.  9/6 - patient had tmax 102F, and patient had worsening afib rvr, cardizem dripped went up to 10, s/p IV tylenol with improvement of her rigors.  s/p blood culture   9/7 - no fever overnight, diltiazem drip off overnight, net negative 1 L.    9.8: no cp, no sob, c/o coughing             REVIEW OF SYSTEMS:    CONSTITUTIONAL: No weakness, No fevers or chills  ENT: No ear ache, No sorethroat  NECK: No pain, No stiffness  RESPIRATORY: No cough, No wheezing, No hemoptysis; No dyspnea  CARDIOVASCULAR: No chest pain, No palpitations  GASTROINTESTINAL: No abd pain, No nausea, No vomiting, No hematemesis, No diarrhea or constipation. No melena, No hematochezia.  GENITOURINARY: No dysuria, No  hematuria  NEUROLOGICAL: No diplopia, No paresthesia, No motor dysfunction  MUSCULOSKELETAL: No arthralgia, No myalgia  SKIN: No rashes, or lesions   PSYCH: no anxiety, no suicidal ideation    All other review of systems is negative unless indicated above    Vital Signs Last 24 Hrs  T(C): 36.2 (08 Sep 2023 11:58), Max: 36.3 (07 Sep 2023 17:57)  T(F): 97.2 (08 Sep 2023 11:58), Max: 97.3 (07 Sep 2023 17:57)  HR: 74 (08 Sep 2023 15:40) (65 - 112)  BP: 101/51 (08 Sep 2023 15:40) (82/64 - 119/67)  BP(mean): 66 (08 Sep 2023 14:00) (54 - 93)  RR: --  SpO2: 94% (08 Sep 2023 15:40) (79% - 100%)    Parameters below as of 08 Sep 2023 15:40  Patient On (Oxygen Delivery Method): room air        PHYSICAL EXAM:    GENERAL: NAD  HEENT:  NC/AT, EOMI, PERRLA, No scleral icterus, Moist mucous membranes  NECK: Supple, No JVD  CNS:  Alert & Oriented X3, Motor Strength 5/5 B/L upper and lower extremities; DTRs 2+ intact   LUNG: Normal Breath sounds, Clear to auscultation bilaterally, No rales, No rhonchi, No wheezing  HEART: RRR; No murmurs, No rubs  ABDOMEN: +BS, ST/ND/NT  GENITOURINARY: Voiding, Bladder not distended  EXTREMITIES:  2+ Peripheral Pulses, No clubbing, No cyanosis, No tibial edema  MUSCULOSKELTAL: Joints normal ROM, No TTP, No effusion  VAGINAL: deferred  SKIN: no rashes  RECTAL: deferred, not indicated  BREAST: deferred                          11.6   7.43  )-----------( 368      ( 08 Sep 2023 05:34 )             35.3     09-08    139  |  106  |  16  ----------------------------<  113<H>  3.9   |  28  |  0.91    Ca    8.7      08 Sep 2023 05:34  Phos  3.3     09-08  Mg     2.2     09-08      Vancomycin levels:   Cultures:     MEDICATIONS  (STANDING):  acetaminophen   IVPB .. 1000 milliGRAM(s) IV Intermittent once  atorvastatin 20 milliGRAM(s) Oral at bedtime  chlorhexidine 4% Liquid 1 Application(s) Topical <User Schedule>  clopidogrel Tablet 75 milliGRAM(s) Oral daily  diltiazem    Tablet 30 milliGRAM(s) Oral every 6 hours  dorzolamide 2%/timolol 0.5% Ophthalmic Solution 1 Drop(s) Both EYES two times a day  hydroxyurea 500 milliGRAM(s) Oral <User Schedule>  loratadine 10 milliGRAM(s) Oral daily  metoprolol tartrate 25 milliGRAM(s) Oral two times a day  rivaroxaban 15 milliGRAM(s) Oral with dinner    MEDICATIONS  (PRN):  acetaminophen     Tablet .. 650 milliGRAM(s) Oral every 6 hours PRN Temp greater or equal to 38C (100.4F), Mild Pain (1 - 3)  aluminum hydroxide/magnesium hydroxide/simethicone Suspension 30 milliLiter(s) Oral every 4 hours PRN Dyspepsia  guaiFENesin Oral Liquid (Sugar-Free) 100 milliGRAM(s) Oral every 6 hours PRN Cough  ondansetron Injectable 4 milliGRAM(s) IV Push every 8 hours PRN Nausea and/or Vomiting      all labs reviewed  all imaging reviewed        83 y/o F with hx of CAD , recent LAD PCI about 1 wek ago, afib, , thrombocythemia on hydroxyurea, present with palpitation , found to be in afib rvr, with fever.  Started diltiazem drip and antibiotic.  initial infectious workup unremarkable, CT showed gallbladder distention, HIDA negative.    Thryoid function unremarkable    blood culture repeated 9/6, pending   AC with xarelto  Patient has worsening wheezing 9/6, BNP elevated, concern for cardiac wheeze/fluid overload, started lasix 9/6 , total negative 1 L overnight.  Lasix stopped 9/7  no further wheezes  Possible fever associated with hydroxyurea use?  However procal went up from 0.7 to 1.6, will continue antibiotics,  ID consult placed   Diltiazem dripped was off overnight,  tolerating diltiazem PO 30  mg q6h     #afib rvr  rate controlled  c/w Lopressor , Cardizem, Xarelto     #CAD s/p recent PCI   Trops leveled  c/w Plavix/Statins  on Xarelto for Afib    #fever of unknown origin  off Abx per ID  EBvirus IgM +  will call hematology to r/o lymphoproliferative disorders as EBV is known to be associated with     #mod pulm HTN     #Thrombocytosis  on Hydrea     Sedation/Analgesia: none   Vasoactive medications:  none   DVT prophylaxis:  on xarelto   GI prophylaxis:  none   Nutrition: DASH diet  Central line:  none   Mosher:  purewick for ins and outs  Mobility: PT consulted    GOC: full code

## 2023-09-08 NOTE — PHYSICAL THERAPY INITIAL EVALUATION ADULT - GAIT DISTANCE, PT EVAL
Omid Dejesus is a 25 year old female presenting to discuss depression and anxiety. As well as autism and ADHD.  Denies known Latex allergy or symptoms of Latex sensitivity.  Medications verified, no changes.  Health Maintenance Due   Topic Date Due   • COVID-19 Vaccine (3 - Booster for Moderna series) 04/27/2021       Patient is up to date, no discussion needed.  Patient would like communication of their results via:      [employer]    Cell Phone:   Telephone Information:   Mobile [phone]     Okay to leave a message containing results? Yes     bed to chair/5 feet

## 2023-09-08 NOTE — PHYSICAL THERAPY INITIAL EVALUATION ADULT - GENERAL OBSERVATIONS, REHAB EVAL
pt rec'd supine in bed in CCU, O2, monitors, reports was oobtc earlier but felt weak requesting return to bed, agreeable to PT/oob

## 2023-09-08 NOTE — PHYSICAL THERAPY INITIAL EVALUATION ADULT - PERTINENT HX OF CURRENT PROBLEM, REHAB EVAL
Presented with palpitations and PASCAL at home Also with fever, diarrhea   She was initially admitted to the floor, had an episode of A.fib with RVR, high fever of 104.5.  tx from 3N to CCU. had HIDA scan which was neg. pt on cardizem drip for RVR, now off. elev trop, trended down, likely demand ischemia per md notes. pt w/ intermittent fevers. per ID note today Acute infectious mononucleosis

## 2023-09-09 PROCEDURE — 99232 SBSQ HOSP IP/OBS MODERATE 35: CPT

## 2023-09-09 PROCEDURE — 99233 SBSQ HOSP IP/OBS HIGH 50: CPT

## 2023-09-09 RX ORDER — LANOLIN ALCOHOL/MO/W.PET/CERES
5 CREAM (GRAM) TOPICAL AT BEDTIME
Refills: 0 | Status: DISCONTINUED | OUTPATIENT
Start: 2023-09-09 | End: 2023-09-15

## 2023-09-09 RX ORDER — METOPROLOL TARTRATE 50 MG
50 TABLET ORAL
Refills: 0 | Status: ACTIVE | OUTPATIENT
Start: 2023-09-09 | End: 2024-08-07

## 2023-09-09 RX ADMIN — Medication 50 MILLIGRAM(S): at 13:49

## 2023-09-09 RX ADMIN — RIVAROXABAN 15 MILLIGRAM(S): KIT at 18:06

## 2023-09-09 RX ADMIN — Medication 5 MILLIGRAM(S): at 22:46

## 2023-09-09 RX ADMIN — Medication 30 MILLIGRAM(S): at 05:43

## 2023-09-09 RX ADMIN — DORZOLAMIDE HYDROCHLORIDE TIMOLOL MALEATE 1 DROP(S): 20; 5 SOLUTION/ DROPS OPHTHALMIC at 11:11

## 2023-09-09 RX ADMIN — CHLORHEXIDINE GLUCONATE 1 APPLICATION(S): 213 SOLUTION TOPICAL at 10:41

## 2023-09-09 RX ADMIN — Medication 30 MILLIGRAM(S): at 13:42

## 2023-09-09 RX ADMIN — Medication 30 MILLIGRAM(S): at 23:47

## 2023-09-09 RX ADMIN — Medication 25 MILLIGRAM(S): at 11:09

## 2023-09-09 RX ADMIN — CLOPIDOGREL BISULFATE 75 MILLIGRAM(S): 75 TABLET, FILM COATED ORAL at 11:09

## 2023-09-09 RX ADMIN — Medication 50 MILLIGRAM(S): at 22:46

## 2023-09-09 RX ADMIN — Medication 30 MILLIGRAM(S): at 18:06

## 2023-09-09 RX ADMIN — ATORVASTATIN CALCIUM 20 MILLIGRAM(S): 80 TABLET, FILM COATED ORAL at 22:46

## 2023-09-09 RX ADMIN — LORATADINE 10 MILLIGRAM(S): 10 TABLET ORAL at 11:08

## 2023-09-09 RX ADMIN — DORZOLAMIDE HYDROCHLORIDE TIMOLOL MALEATE 1 DROP(S): 20; 5 SOLUTION/ DROPS OPHTHALMIC at 22:46

## 2023-09-09 RX ADMIN — Medication 100 MILLIGRAM(S): at 18:06

## 2023-09-09 NOTE — CONSULT NOTE ADULT - ASSESSMENT
83 y/o Female with a MHx significant for CAD, recent LAD PCI, A- Fib on Xarelto and thrombocythemia on Hydroxyurea, p/w palpitation, found to be in A-Fib, s/p diltiazem drip and antibiotic. Initial infectious workup unremarkable, CT showed gallbladder distention, HIDA negative.     # Thrombocytopenia-R/O Lymphoproliferative Ds    -Referred by PCP to Hematologist-Dr. Rodriguez for thrombocytosis in June, 2023 at the same had underwent biopsy for scalp lesion, s/p excision of pilar cyst-->w/u sent to r/o thrombocytosis due to reactivity secondary to scalp infection versus developing primary myeloproliferative disorder.   -with persistent thrombocytosis patient was started on Hydrea 500 mg TIW on 8/4/2023-->platelets normalized in 9/1/23  -Platelets upon presentation in ED-287 K/ul  -plts-368 K/ul as of 9/8/2023  -Monitor CBC  -Patient reported diarrhea since start of Hydrea, possible but less likely.  -Continue Hydrea 500 mg PO TIW (M-W-F)  -f/u outpatient with Dr. Rodriguez for further w/u    # A- Fib/CAD    -Cardiology following --> on Diltiazem, Metoprolol  -On AC w/ Xarelto.  -s/p single vessel CAD (80% pLAD) s/p NELSON 8/23/23->on Plavix, atorvastatin, BB.    # Febrile syndrome/ Acute infectious mononucleosis.     - No radiologic evidence of acute cholecystitis. A.fib.   - fever subsided  - Lyme AB, babesia PCR are negative  - serology for EBV indicates acute infection  - BC x 2, urine c/s- NGTD  - ID following -->no need for abx therapy   - monitor temps, f/u CBC  - supportive care     83 y/o Female with a MHx significant for CAD, recent LAD PCI, A- Fib on Xarelto and thrombocythemia on Hydroxyurea, p/w palpitation, found to be in A-Fib, s/p diltiazem drip and antibiotic. Initial infectious workup unremarkable, CT showed gallbladder distention, HIDA negative.     # Thrombocythemia-R/O Lymphoproliferative Ds    -Referred by PCP to Hematologist-Dr. Rodriguez for thrombocytosis in June, 2023 at the same had underwent biopsy for scalp lesion, s/p excision of pilar cyst-->w/u sent to r/o thrombocytosis due to reactivity secondary to scalp infection versus developing primary myeloproliferative disorder.   -with persistent thrombocytosis patient was started on Hydrea 500 mg TIW on 8/4/2023-->platelets normalized in 9/1/23  -Platelets upon presentation in ED-287 K/ul  -plts-368 K/ul as of 9/8/2023  -Monitor CBC  -Patient reported diarrhea since start of Hydrea, possible but less likely.  -Continue Hydrea 500 mg PO TIW (M-W-F)  -f/u outpatient with Dr. Rodriguez for further w/u    # A- Fib/CAD    -Cardiology following --> on Diltiazem, Metoprolol  -On AC w/ Xarelto.  -s/p single vessel CAD (80% pLAD) s/p NELSON 8/23/23->on Plavix, atorvastatin, BB.    # Febrile syndrome/ Acute infectious mononucleosis.     - No radiologic evidence of acute cholecystitis. A.fib.   - fever subsided  - Lyme AB, babesia PCR are negative  - serology for EBV indicates acute infection  - BC x 2, urine c/s- NGTD  - ID following -->no need for abx therapy   - monitor temps, f/u CBC  - supportive care

## 2023-09-09 NOTE — PROGRESS NOTE ADULT - PROBLEM SELECTOR PLAN 2
Single vessel CAD (80% pLAD) s/p NELSON 8/23/23; previously seen elevation of troponin has markedly improved;   continue Plavix, atorvastatin, BB.

## 2023-09-09 NOTE — PROGRESS NOTE ADULT - PROBLEM SELECTOR PLAN 1
Remains in AF -- rate is controlled for the most part, occ bursts up to 140; continue diltiazem, metoprolol as BP allows (has been low); continue anticoagulation w/ Xarelto.  TSH WNL  Maintain K>4, Mg>2    * I discussed case with Dr Lewis (ICU attending) Remains in AF -- rate is controlled for the most part, occ bursts up to 140; continue diltiazem, metoprolol as BP allows (has been low); continue anticoagulation w/ Xarelto.  TSH WNL  Maintain K>4, Mg>2

## 2023-09-09 NOTE — CONSULT NOTE ADULT - SUBJECTIVE AND OBJECTIVE BOX
HPI:    83 y/o Female with a PMHx of left shoulder pain, stress incontinence, eczema, low back pain, arthritis, glaucoma, chronic rhinitis presents to the ED with complain of palpitations and dyspnea on exertion. Diagnosed with A-fib 3 weeks ago and placed on Xarelto Had stents placed 1 week ago with Dr. Toney and placed on Plavix and Metroprolol with improvement of exertional chest pressure.     Patient started on have sudden onset of palpitations while having dinner night before coming to ER. She was feeling out of breath yesterday evening going up the stairs, also felt chest pressure and tremors. Also had diarrhea, fever of 101 F, chills, and diaphoresis that subsided. Patient was found to be in A fib with RVR when she arrived in ED. She converted to NSR spontaneously.     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  Urinary frequency  Eczema  Stress incontinence in female  Shoulder pain, left  Elective surgery- "vaginal tissue biopsy" 2000  History of tonsillectomy as a child    FAMILY HISTORY:    prostate cancer in father (Father)  glioblastoma (Mother, Sibling)  coronary artery disease (Sibling)  diabetes mellitus (DM) (Sibling)    MEDICATIONS  (STANDING):    acetaminophen IVPB 1000 milliGRAM(s) IV Intermittent once  atorvastatin 20 milliGRAM(s) Oral at bedtime  chlorhexidine 4% Liquid 1 Application(s) Topical <User Schedule>  clopidogrel Tablet 75 milliGRAM(s) Oral daily  diltiazem    Tablet 30 milliGRAM(s) Oral every 6 hours  dorzolamide 2%/timolol 0.5% Ophthalmic Solution 1 Drop(s) Both EYES two times a day  hydroxyurea 500 milliGRAM(s) Oral <User Schedule>  loratadine 10 milliGRAM(s) Oral daily  metoprolol tartrate 25 milliGRAM(s) Oral two times a day  rivaroxaban 15 milliGRAM(s) Oral with dinner    MEDICATIONS  (PRN):    acetaminophen     Tablet .. 650 milliGRAM(s) Oral every 6 hours PRN Temp greater or equal to 38C (100.4F), Mild Pain (1 - 3)  aluminum hydroxide/magnesium hydroxide/simethicone Suspension 30 milliLiter(s) Oral every 4 hours PRN Dyspepsia  guaiFENesin Oral Liquid (Sugar-Free) 100 milliGRAM(s) Oral every 6 hours PRN Cough  ondansetron Injectable 4 milliGRAM(s) IV Push every 8 hours PRN Nausea and/or Vomiting    Allergies    No Known Allergies    Review of Systems    Constitutional, Eyes, ENT, Cardiovascular, Respiratory, Gastrointestinal, Genitourinary, Musculoskeletal, Integumentary, Neurological, Psychiatric, Endocrine, Heme/Lymph and Allergic/Immunologic review of systems are otherwise negative except as noted in HPI.     Vital Signs Last 24 Hrs    T(C): 36.5 (09 Sep 2023 08:00), Max: 37.9 (08 Sep 2023 18:00)  T(F): 97.7 (09 Sep 2023 08:00), Max: 100.2 (08 Sep 2023 18:00)  HR: 96 (09 Sep 2023 08:00) (65 - 140)  BP: 127/75 (09 Sep 2023 08:00) (87/37 - 152/93)  BP(mean): 66 (08 Sep 2023 14:00) (54 - 82)  RR: 16 (09 Sep 2023 08:00) (16 - 25)  SpO2: 98% (09 Sep 2023 08:00) (84% - 98%)    Parameters below as of 09 Sep 2023 08:00    O2 Flow (L/min): 5      Physical Exam    Eyes: no conjunctival infection, anicteric.   ENT: pharynx is unremarkable, moist mucus membrane, no oral lesions.   Neck: supple without JVD, no thyromegaly or masses appreciated.   Pulmonary: clear to auscultation bilaterally, no dullness, no wheezing.   Cardiac: RRR, normal S1S2, no murmurs, rubs, gallops.   Vascular: no JVD, no calf tenderness, venous stasis changes, varices.   Abdomen: normoactive bowel sounds, soft and nontender, no hepatosplenomegaly or masses appreciated.   Lymphatic: no peripheral adenopathy appreciated.   Musculoskeletal: full range of motion and no deformities appreciated.   Skin: normal appearance, no rash, nodules, vesicles, ulcers, erythema.   Neurology: grossly intact.   Psychiatric: affect appropriate.     LABS:    CBC Full  -  ( 08 Sep 2023 05:34 )  WBC Count : 7.43 K/uL  RBC Count : 3.85 M/uL  Hemoglobin : 11.6 g/dL  Hematocrit : 35.3 %  Platelet Count - Automated : 368 K/uL  Mean Cell Volume : 91.7 fl  Mean Cell Hemoglobin : 30.1 pg  Mean Cell Hemoglobin Concentration : 32.9 gm/dL  Auto Neutrophil # : x  Auto Lymphocyte # : x  Auto Monocyte # : x  Auto Eosinophil # : x  Auto Basophil # : x  Auto Neutrophil % : x  Auto Lymphocyte % : x  Auto Monocyte % : x  Auto Eosinophil % : x  Auto Basophil % : x    09-08    139  |  106  |  16  ----------------------------<  113<H>  3.9   |  28  |  0.91    Ca    8.7      08 Sep 2023 05:34  Phos  3.3     09-08  Mg     2.2     09-08    Urinalysis Basic - ( 08 Sep 2023 05:34 )    Color: x / Appearance: x / SG: x / pH: x  Gluc: 113 mg/dL / Ketone: x  / Bili: x / Urobili: x   Blood: x / Protein: x / Nitrite: x   Leuk Esterase: x / RBC: x / WBC x   Sq Epi: x / Non Sq Epi: x / Bacteria: x    RADIOLOGY & ADDITIONAL STUDIES:    EXAM:  CT ABDOMEN AND PELVIS IC      EXAM:  CT CHEST IC       PROCEDURE DATE:  09/02/2023      INTERPRETATION:  CLINICAL INFORMATION: Shortness of breathand sepsis    COMPARISON: Same day chest x-ray, chest CT 8/10/2023    CONTRAST/COMPLICATIONS:  IV Contrast: Omnipaque 350 (accession [id number]), IV contrast documented   in unlinked concurrent exam (accession [id number])  90 cc administered   10   cc discarded  Oral Contrast: NONE  Complications: None reported at time of study completion    PROCEDURE:  CT of the Chest, Abdomen and Pelvis was performed.  Sagittal and coronal reformats were performed.    FINDINGS:  CHEST:  LUNGS AND LARGE AIRWAYS: Thecentral airways are patent. Bibasilar   atelectasis.  PLEURA: Small bilateral pleural effusions.  VESSELS: Normal caliber aorta. Main pulmonary arteries are patent.  HEART: No cardiomegaly. No pericardial effusion. Coronary artery   calcifications are present.  MEDIASTINUM AND MICHAEL: No adenopathy.  CHEST WALL AND LOWER NECK: No masses.    ABDOMEN AND PELVIS:  LIVER: Normal.  BILE DUCTS: Nondilated.  GALLBLADDER: Diffuse nonspecific gallbladder wall edema.  SPLEEN: Normal.  PANCREAS: 6 mm cyst in the body without main duct dilatation.  ADRENALS: Normal.  KIDNEYS/URETERS: No calculi, hydronephrosis, or soft tissue attenuating   mass.    BLADDER: Normal.  REPRODUCTIVE ORGANS: No masses.    BOWEL: No bowel-related abnormality. No bowel obstruction or bowel   inflammation. Normal appendix and ileocecal region. No evidence of active   colitis or diverticulitis.  PERITONEUM: Small pelvic free fluid. No free air.  VESSELS: Aortoiliac atherosclerosis without aneurysm.  RETROPERITONEUM/LYMPH NODES:No adenopathy.  ABDOMINAL WALL: Normal.  BONES: No acute bony abnormality.    IMPRESSION:  *  No acute septic pathology.  *  No drainable collection.  *  Diffuse nonspecific gallbladder wall edema. Correlate for acute   cholecystitis.      EXAM:  NM HEPATOBILIARY IMG      PROCEDURE DATE:  09/05/2023      INTERPRETATION:  RADIOPHARMACEUTICAL:  99mTc-Mebrofenin  DOSE: 3.3 mCi IV    CLINICAL INFORMATION: 82 year old female with sepsis and gallbladder wall   edema, referred to evaluate for acute cholecystitis    TECHNIQUE: Dynamic images of the anterior abdomen were obtained for 40   minutes immediately following radiotracer injection. Static images of the   abdomen in the anterior projection was also obtained.    COMPARISON: No previous hepatobiliary scan for comparison    FINDINGS: There is prompt uptake of the injected radiotracer by the   hepatocytes. The gallbladder is first visualized at 15 minutes post   tracer injection and bowel activity by 25 minutes. There is normal tracer   clearance from the liver by the end of the study.    IMPRESSION: Normal hepatobiliary scan.    No scan evidence of acute cholecystitis.     HPI:    83 y/o Female with a PMHx of left shoulder pain, stress incontinence, eczema, low back pain, arthritis, glaucoma, chronic rhinitis presents to the ED with complain of palpitations and dyspnea on exertion. Diagnosed with A-fib 3 weeks ago and placed on Xarelto Had stents placed 1 week ago with Dr. Toney and placed on Plavix and Metroprolol with improvement of exertional chest pressure.     Patient started on have sudden onset of palpitations while having dinner night before coming to ER. She was feeling out of breath yesterday evening going up the stairs, also felt chest pressure and tremors. Also had diarrhea, fever of 101 F, chills, and diaphoresis that subsided. Patient was found to be in A fib with RVR when she arrived in ED. She converted to NSR spontaneously.     9/9/2023- Seen at bedside with Dr. Garza, in no acute distress. aware to continue on Hydrea and f/u outpatient with Dr. Rodriguez.    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  Urinary frequency  Eczema  Stress incontinence in female  Shoulder pain, left  Elective surgery- "vaginal tissue biopsy" 2000  History of tonsillectomy as a child    FAMILY HISTORY:    prostate cancer in father (Father)  glioblastoma (Mother, Sibling)  coronary artery disease (Sibling)  diabetes mellitus (DM) (Sibling)    MEDICATIONS  (STANDING):    acetaminophen IVPB 1000 milliGRAM(s) IV Intermittent once  atorvastatin 20 milliGRAM(s) Oral at bedtime  chlorhexidine 4% Liquid 1 Application(s) Topical <User Schedule>  clopidogrel Tablet 75 milliGRAM(s) Oral daily  diltiazem    Tablet 30 milliGRAM(s) Oral every 6 hours  dorzolamide 2%/timolol 0.5% Ophthalmic Solution 1 Drop(s) Both EYES two times a day  hydroxyurea 500 milliGRAM(s) Oral <User Schedule>  loratadine 10 milliGRAM(s) Oral daily  metoprolol tartrate 25 milliGRAM(s) Oral two times a day  rivaroxaban 15 milliGRAM(s) Oral with dinner    MEDICATIONS  (PRN):    acetaminophen     Tablet .. 650 milliGRAM(s) Oral every 6 hours PRN Temp greater or equal to 38C (100.4F), Mild Pain (1 - 3)  aluminum hydroxide/magnesium hydroxide/simethicone Suspension 30 milliLiter(s) Oral every 4 hours PRN Dyspepsia  guaiFENesin Oral Liquid (Sugar-Free) 100 milliGRAM(s) Oral every 6 hours PRN Cough  ondansetron Injectable 4 milliGRAM(s) IV Push every 8 hours PRN Nausea and/or Vomiting    Allergies    No Known Allergies    Review of Systems    Constitutional, Eyes, ENT, Cardiovascular, Respiratory, Gastrointestinal, Genitourinary, Musculoskeletal, Integumentary, Neurological, Psychiatric, Endocrine, Heme/Lymph and Allergic/Immunologic review of systems are otherwise negative except as noted in HPI.     Vital Signs Last 24 Hrs    T(C): 36.5 (09 Sep 2023 08:00), Max: 37.9 (08 Sep 2023 18:00)  T(F): 97.7 (09 Sep 2023 08:00), Max: 100.2 (08 Sep 2023 18:00)  HR: 96 (09 Sep 2023 08:00) (65 - 140)  BP: 127/75 (09 Sep 2023 08:00) (87/37 - 152/93)  BP(mean): 66 (08 Sep 2023 14:00) (54 - 82)  RR: 16 (09 Sep 2023 08:00) (16 - 25)  SpO2: 98% (09 Sep 2023 08:00) (84% - 98%)    Parameters below as of 09 Sep 2023 08:00    O2 Flow (L/min): 5      Physical Exam    Eyes: no conjunctival infection, anicteric.   ENT: pharynx is unremarkable, moist mucus membrane, no oral lesions.   Neck: supple without JVD, no thyromegaly or masses appreciated.   Pulmonary: clear to auscultation bilaterally, no dullness, no wheezing.   Cardiac: RRR, normal S1S2, no murmurs, rubs, gallops.   Vascular: no JVD, no calf tenderness, venous stasis changes, varices.   Abdomen: normoactive bowel sounds, soft and nontender, no hepatosplenomegaly or masses appreciated.   Lymphatic: no peripheral adenopathy appreciated.   Musculoskeletal: full range of motion and no deformities appreciated.   Skin: normal appearance, no rash, nodules, vesicles, ulcers, erythema.   Neurology: grossly intact.   Psychiatric: affect appropriate.     LABS:    CBC Full  -  ( 08 Sep 2023 05:34 )  WBC Count : 7.43 K/uL  RBC Count : 3.85 M/uL  Hemoglobin : 11.6 g/dL  Hematocrit : 35.3 %  Platelet Count - Automated : 368 K/uL  Mean Cell Volume : 91.7 fl  Mean Cell Hemoglobin : 30.1 pg  Mean Cell Hemoglobin Concentration : 32.9 gm/dL  Auto Neutrophil # : x  Auto Lymphocyte # : x  Auto Monocyte # : x  Auto Eosinophil # : x  Auto Basophil # : x  Auto Neutrophil % : x  Auto Lymphocyte % : x  Auto Monocyte % : x  Auto Eosinophil % : x  Auto Basophil % : x    09-08    139  |  106  |  16  ----------------------------<  113<H>  3.9   |  28  |  0.91    Ca    8.7      08 Sep 2023 05:34  Phos  3.3     09-08  Mg     2.2     09-08    Urinalysis Basic - ( 08 Sep 2023 05:34 )    Color: x / Appearance: x / SG: x / pH: x  Gluc: 113 mg/dL / Ketone: x  / Bili: x / Urobili: x   Blood: x / Protein: x / Nitrite: x   Leuk Esterase: x / RBC: x / WBC x   Sq Epi: x / Non Sq Epi: x / Bacteria: x    RADIOLOGY & ADDITIONAL STUDIES:    EXAM:  CT ABDOMEN AND PELVIS IC      EXAM:  CT CHEST IC       PROCEDURE DATE:  09/02/2023      INTERPRETATION:  CLINICAL INFORMATION: Shortness of breathand sepsis    COMPARISON: Same day chest x-ray, chest CT 8/10/2023    CONTRAST/COMPLICATIONS:  IV Contrast: Omnipaque 350 (accession [id number]), IV contrast documented   in unlinked concurrent exam (accession [id number])  90 cc administered   10   cc discarded  Oral Contrast: NONE  Complications: None reported at time of study completion    PROCEDURE:  CT of the Chest, Abdomen and Pelvis was performed.  Sagittal and coronal reformats were performed.    FINDINGS:  CHEST:  LUNGS AND LARGE AIRWAYS: Thecentral airways are patent. Bibasilar   atelectasis.  PLEURA: Small bilateral pleural effusions.  VESSELS: Normal caliber aorta. Main pulmonary arteries are patent.  HEART: No cardiomegaly. No pericardial effusion. Coronary artery   calcifications are present.  MEDIASTINUM AND MICHAEL: No adenopathy.  CHEST WALL AND LOWER NECK: No masses.    ABDOMEN AND PELVIS:  LIVER: Normal.  BILE DUCTS: Nondilated.  GALLBLADDER: Diffuse nonspecific gallbladder wall edema.  SPLEEN: Normal.  PANCREAS: 6 mm cyst in the body without main duct dilatation.  ADRENALS: Normal.  KIDNEYS/URETERS: No calculi, hydronephrosis, or soft tissue attenuating   mass.    BLADDER: Normal.  REPRODUCTIVE ORGANS: No masses.    BOWEL: No bowel-related abnormality. No bowel obstruction or bowel   inflammation. Normal appendix and ileocecal region. No evidence of active   colitis or diverticulitis.  PERITONEUM: Small pelvic free fluid. No free air.  VESSELS: Aortoiliac atherosclerosis without aneurysm.  RETROPERITONEUM/LYMPH NODES:No adenopathy.  ABDOMINAL WALL: Normal.  BONES: No acute bony abnormality.    IMPRESSION:  *  No acute septic pathology.  *  No drainable collection.  *  Diffuse nonspecific gallbladder wall edema. Correlate for acute   cholecystitis.      EXAM:  NM HEPATOBILIARY IMG      PROCEDURE DATE:  09/05/2023      INTERPRETATION:  RADIOPHARMACEUTICAL:  99mTc-Mebrofenin  DOSE: 3.3 mCi IV    CLINICAL INFORMATION: 82 year old female with sepsis and gallbladder wall   edema, referred to evaluate for acute cholecystitis    TECHNIQUE: Dynamic images of the anterior abdomen were obtained for 40   minutes immediately following radiotracer injection. Static images of the   abdomen in the anterior projection was also obtained.    COMPARISON: No previous hepatobiliary scan for comparison    FINDINGS: There is prompt uptake of the injected radiotracer by the   hepatocytes. The gallbladder is first visualized at 15 minutes post   tracer injection and bowel activity by 25 minutes. There is normal tracer   clearance from the liver by the end of the study.    IMPRESSION: Normal hepatobiliary scan.    No scan evidence of acute cholecystitis.

## 2023-09-09 NOTE — PROGRESS NOTE ADULT - SUBJECTIVE AND OBJECTIVE BOX
81 y/o female with recent hospitalization for CAD, underwent LAD PCI, paroxysmal A.fib, thrombocythemia on hydroxyurea   Presented with palpitations and PASCAL at home   Also with fever, diarrhea   She was initially admitted to the floor, had an episode of A.fib with RVR, high fever of 104.5.    9/4 Case discussed on CCU rounds, afebrile for HIDA in am.  9/5 Case discussed on CCU rounds, HIDA- negative, still with low grade temps., and AF RVR.  9/6 - patient had tmax 102F, and patient had worsening afib rvr, cardizem dripped went up to 10, s/p IV tylenol with improvement of her rigors.  s/p blood culture   9/7 - no fever overnight, diltiazem drip off overnight, net negative 1 L.    9.8: no cp, no sob, c/o coughing   9.9: +anxiety, Tele: AFib Rvr 120/min, no cp, no sob          REVIEW OF SYSTEMS:    CONSTITUTIONAL: No weakness, No fevers or chills  ENT: No ear ache, No sorethroat  NECK: No pain, No stiffness  RESPIRATORY: No cough, No wheezing, No hemoptysis; No dyspnea  CARDIOVASCULAR: No chest pain, No palpitations  GASTROINTESTINAL: No abd pain, No nausea, No vomiting, No hematemesis, No diarrhea or constipation. No melena, No hematochezia.  GENITOURINARY: No dysuria, No  hematuria  NEUROLOGICAL: No diplopia, No paresthesia, No motor dysfunction  MUSCULOSKELETAL: No arthralgia, No myalgia  SKIN: No rashes, or lesions   PSYCH: no anxiety, no suicidal ideation    All other review of systems is negative unless indicated above    Vital Signs Last 24 Hrs  T(C): 36.5 (09 Sep 2023 08:00), Max: 37.9 (08 Sep 2023 18:00)  T(F): 97.7 (09 Sep 2023 08:00), Max: 100.2 (08 Sep 2023 18:00)  HR: 96 (09 Sep 2023 08:00) (88 - 140)  BP: 127/75 (09 Sep 2023 08:00) (106/53 - 152/93)  BP(mean): --  RR: 16 (09 Sep 2023 08:00) (16 - 25)  SpO2: 98% (09 Sep 2023 08:00) (85% - 98%)    Parameters below as of 09 Sep 2023 08:00    O2 Flow (L/min): 5      Parameters below as of 08 Sep 2023 15:40  Patient On (Oxygen Delivery Method): room air        PHYSICAL EXAM:    GENERAL: NAD  HEENT:  NC/AT, EOMI, PERRLA, No scleral icterus, Moist mucous membranes  NECK: Supple, No JVD  CNS:  Alert & Oriented X3, Motor Strength 5/5 B/L upper and lower extremities; DTRs 2+ intact   LUNG: Normal Breath sounds, Clear to auscultation bilaterally, No rales, No rhonchi, No wheezing  HEART: RRR; No murmurs, No rubs  ABDOMEN: +BS, ST/ND/NT  GENITOURINARY: Voiding, Bladder not distended  EXTREMITIES:  2+ Peripheral Pulses, No clubbing, No cyanosis, No tibial edema  MUSCULOSKELTAL: Joints normal ROM, No TTP, No effusion  VAGINAL: deferred  SKIN: no rashes  RECTAL: deferred, not indicated  BREAST: deferred                          11.6   7.43  )-----------( 368      ( 08 Sep 2023 05:34 )             35.3     09-08    139  |  106  |  16  ----------------------------<  113<H>  3.9   |  28  |  0.91    Ca    8.7      08 Sep 2023 05:34  Phos  3.3     09-08  Mg     2.2     09-08      Vancomycin levels:   Cultures:     MEDICATIONS  (STANDING):  acetaminophen   IVPB .. 1000 milliGRAM(s) IV Intermittent once  atorvastatin 20 milliGRAM(s) Oral at bedtime  chlorhexidine 4% Liquid 1 Application(s) Topical <User Schedule>  clopidogrel Tablet 75 milliGRAM(s) Oral daily  diltiazem    Tablet 30 milliGRAM(s) Oral every 6 hours  dorzolamide 2%/timolol 0.5% Ophthalmic Solution 1 Drop(s) Both EYES two times a day  hydroxyurea 500 milliGRAM(s) Oral <User Schedule>  loratadine 10 milliGRAM(s) Oral daily  metoprolol tartrate 25 milliGRAM(s) Oral two times a day  rivaroxaban 15 milliGRAM(s) Oral with dinner    MEDICATIONS  (PRN):  acetaminophen     Tablet .. 650 milliGRAM(s) Oral every 6 hours PRN Temp greater or equal to 38C (100.4F), Mild Pain (1 - 3)  aluminum hydroxide/magnesium hydroxide/simethicone Suspension 30 milliLiter(s) Oral every 4 hours PRN Dyspepsia  guaiFENesin Oral Liquid (Sugar-Free) 100 milliGRAM(s) Oral every 6 hours PRN Cough  ondansetron Injectable 4 milliGRAM(s) IV Push every 8 hours PRN Nausea and/or Vomiting      all labs reviewed  all imaging reviewed        81 y/o F with hx of CAD , recent LAD PCI about 1 wek ago, afib, , thrombocythemia on hydroxyurea, present with palpitation , found to be in afib rvr, with fever.  Started diltiazem drip and antibiotic.  initial infectious workup unremarkable, CT showed gallbladder distention, HIDA negative.  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         #afib rvr  not controlled  Increase Lopressor and c/w  Cardizem, Xarelto     #CAD s/p recent PCI   Trops leveled  c/w Plavix/Statins  on Xarelto for Afib    #fever of unknown origin  off Abx per ID  EBvirus IgM +  will call hematology to r/o lymphoproliferative disorders as EBV is known to be associated with     #mod pulm HTN     #Thrombocytosis  on Hydrea     Sedation/Analgesia: none   Vasoactive medications:  none   DVT prophylaxis:  on xarelto   GI prophylaxis:  none   Nutrition: DASH diet  Central line:  none   Mosher:  teeck for ins and outs  Mobility: PT consulted    GOC: full code

## 2023-09-09 NOTE — PROGRESS NOTE ADULT - SUBJECTIVE AND OBJECTIVE BOX
REASON FOR VISIT: AF    HPI:  83 y/o woman with a history of CAD, PCI (LAD, 8/2023), PAF, polycythemia admitted on 9/2/23 with fever and recurrence of AF with RVR.    9/3/23 Events noted , patient developed shortness of breath with hypoxia  with high febrile episode ,  was having nausea ,  some back pain , temp 104  symptoms improved over some time , no recurrence of SOB while in CCU   CT chest did not show CHF or pneumonia    9/4/23 patient had mild degree of sob with febrile episode last night , no chest pain or shortness of breath now   9/5/23 Patient is feeling nauseous , mild abdominal discomfort , in afib with rapid rate since this morning ,   no sob   9/6/23 Feels well complains of palpitations  9/7/23:  Fatigue, resolving cough; no pain / dyspnea  9/8/23:  No new complaints; slept well; no palpitations or dyspnea; remains afebrile.  9/9/23 Pt denies cp, breathing improved.  Feels fatigued and weak.  Tele: afib 90's-110 with bursts up to 140    MEDICATIONS  (STANDING):  acetaminophen   IVPB .. 1000 milliGRAM(s) IV Intermittent once  atorvastatin 20 milliGRAM(s) Oral at bedtime  chlorhexidine 4% Liquid 1 Application(s) Topical <User Schedule>  clopidogrel Tablet 75 milliGRAM(s) Oral daily  diltiazem    Tablet 30 milliGRAM(s) Oral every 6 hours  dorzolamide 2%/timolol 0.5% Ophthalmic Solution 1 Drop(s) Both EYES two times a day  hydroxyurea 500 milliGRAM(s) Oral <User Schedule>  loratadine 10 milliGRAM(s) Oral daily  metoprolol tartrate 25 milliGRAM(s) Oral two times a day  rivaroxaban 15 milliGRAM(s) Oral with dinner    MEDICATIONS  (PRN):  acetaminophen     Tablet .. 650 milliGRAM(s) Oral every 6 hours PRN Temp greater or equal to 38C (100.4F), Mild Pain (1 - 3)  aluminum hydroxide/magnesium hydroxide/simethicone Suspension 30 milliLiter(s) Oral every 4 hours PRN Dyspepsia  guaiFENesin Oral Liquid (Sugar-Free) 100 milliGRAM(s) Oral every 6 hours PRN Cough  ondansetron Injectable 4 milliGRAM(s) IV Push every 8 hours PRN Nausea and/or Vomiting    Vital Signs Last 24 Hrs  T(C): 36.5 (09 Sep 2023 08:00), Max: 37.9 (08 Sep 2023 18:00)  T(F): 97.7 (09 Sep 2023 08:00), Max: 100.2 (08 Sep 2023 18:00)  HR: 96 (09 Sep 2023 08:00) (65 - 140)  BP: 127/75 (09 Sep 2023 08:00) (87/37 - 152/93)  BP(mean): 66 (08 Sep 2023 14:00) (54 - 82)  RR: 16 (09 Sep 2023 08:00) (16 - 25)  SpO2: 98% (09 Sep 2023 08:00) (84% - 98%)    Parameters below as of 09 Sep 2023 08:00    O2 Flow (L/min): 5    PHYSICAL EXAM:  Constitutional: NAD, awake and alert  Respiratory: Breath sounds are clear bilaterally  Cardiovascular: S1 and S2, irregular, normal rate  Gastrointestinal: Bowel Sounds present, soft, nontender.   Extremities: No edema.     LABS:                                              11.6   7.43  )-----------( 368      ( 08 Sep 2023 05:34 )             35.3       139  |  106  |  16  ----------------------------<  113<H>  3.9   |  28  |  0.91    Ca    8.7      08 Sep 2023 05:34  Phos  3.3     09-08  Mg     2.2     09-08    TPro  6.6  /  Alb  2.5<L>  /  TBili  0.4  /  DBili  0.1  /  AST  82<H>  /  ALT  85<H>  /  AlkPhos  109  09-06    TroponinI hsT: 105.32, 655.63, 1045.45, 1472.44, 1832.57         12 Lead ECG (09.05.23 @ 07:42):   Atrial fibrillation with rapid ventricular response  Low voltage QRS  Nonspecific ST abnormality  Abnormal ECG  When compared with ECG of 02-SEP-2023 14:19,  Atrial fibrillation has replaced Sinus rhythm  ST now depressed in Inferior leads  ST now depressed in Anterolateral leads  Nonspecific T wave abnormality now evident in Inferior leads    TTE Echo Complete w/o Contrast w/ Doppler (09.02.23 @ 08:38) >   Mild to Moderate mitral regurgitation.   EA reversal of the mitral inflow consistent with reduced compliance of the left ventricle.   The aortic valve is well visualized, appears mildly calcified. Valve opening seems to be normal.   Normal appearing tricuspid valve structure. Moderate (2+) tricuspid valve regurgitation.   Moderate pulmonary hypertension.   Normal appearing pulmonic valve structure. Mild pulmonic valvular regurgitation.   The left atrium is mildly dilated.   Estimated left ventricular ejection fraction is 60-65 %.   The left ventricle is normal in size, wall motion and contractility as seen in limited views.   Mild concentric left ventricular hypertrophy.   The right atrium appears borderline mildly dilated.   Normal appearing right ventricle structure and function.   IVC is dilated and not collapsing with inspiration.    Tele: AF   REASON FOR VISIT: AF    HPI:  83 y/o woman with a history of CAD, PCI (LAD, 8/2023), PAF, polycythemia admitted on 9/2/23 with fever and recurrence of AF with RVR.    9/3/23 Events noted , patient developed shortness of breath with hypoxia  with high febrile episode ,  was having nausea ,  some back pain , temp 104  symptoms improved over some time , no recurrence of SOB while in CCU   CT chest did not show CHF or pneumonia    9/4/23 patient had mild degree of sob with febrile episode last night , no chest pain or shortness of breath now   9/5/23 Patient is feeling nauseous , mild abdominal discomfort , in afib with rapid rate since this morning ,   no sob   9/6/23 Feels well complains of palpitations  9/7/23:  Fatigue, resolving cough; no pain / dyspnea  9/8/23:  No new complaints; slept well; no palpitations or dyspnea; remains afebrile.  9/9/23 Pt denies cp, breathing improved.  Feels fatigued and weak.  Tele: afib 90's-110 with bursts up to 140    MEDICATIONS  (STANDING):  acetaminophen   IVPB .. 1000 milliGRAM(s) IV Intermittent once  atorvastatin 20 milliGRAM(s) Oral at bedtime  chlorhexidine 4% Liquid 1 Application(s) Topical <User Schedule>  clopidogrel Tablet 75 milliGRAM(s) Oral daily  diltiazem    Tablet 30 milliGRAM(s) Oral every 6 hours  dorzolamide 2%/timolol 0.5% Ophthalmic Solution 1 Drop(s) Both EYES two times a day  hydroxyurea 500 milliGRAM(s) Oral <User Schedule>  loratadine 10 milliGRAM(s) Oral daily  metoprolol tartrate 25 milliGRAM(s) Oral two times a day  rivaroxaban 15 milliGRAM(s) Oral with dinner    MEDICATIONS  (PRN):  acetaminophen     Tablet .. 650 milliGRAM(s) Oral every 6 hours PRN Temp greater or equal to 38C (100.4F), Mild Pain (1 - 3)  aluminum hydroxide/magnesium hydroxide/simethicone Suspension 30 milliLiter(s) Oral every 4 hours PRN Dyspepsia  guaiFENesin Oral Liquid (Sugar-Free) 100 milliGRAM(s) Oral every 6 hours PRN Cough  ondansetron Injectable 4 milliGRAM(s) IV Push every 8 hours PRN Nausea and/or Vomiting    Vital Signs Last 24 Hrs  T(C): 36.5 (09 Sep 2023 08:00), Max: 37.9 (08 Sep 2023 18:00)  T(F): 97.7 (09 Sep 2023 08:00), Max: 100.2 (08 Sep 2023 18:00)  HR: 96 (09 Sep 2023 08:00) (65 - 140)  BP: 127/75 (09 Sep 2023 08:00) (87/37 - 152/93)  BP(mean): 66 (08 Sep 2023 14:00) (54 - 82)  RR: 16 (09 Sep 2023 08:00) (16 - 25)  SpO2: 98% (09 Sep 2023 08:00) (84% - 98%)  O2 Flow (L/min): 5    PHYSICAL EXAM:  Constitutional: NAD, awake and alert  Respiratory: Breath sounds are clear bilaterally  Cardiovascular: S1 and S2, irregular, normal rate  Gastrointestinal: Bowel Sounds present, soft, nontender.   Extremities: No edema.     LABS:                             11.6   7.43  )-----------( 368      ( 08 Sep 2023 05:34 )             35.3     139  |  106  |  16  ----------------------------<  113<H>  3.9   |  28  |  0.91    Ca    8.7      08 Sep 2023 05:34  Phos  3.3     09-08  Mg     2.2     09-08    TPro  6.6  /  Alb  2.5<L>  /  TBili  0.4  /  DBili  0.1  /  AST  82<H>  /  ALT  85<H>  /  AlkPhos  109  09-06    TroponinI hsT: 105.32, 655.63, 1045.45, 1472.44, 1832.57         12 Lead ECG (09.05.23 @ 07:42):   Atrial fibrillation with rapid ventricular response  Low voltage QRS  Nonspecific ST abnormality  Abnormal ECG  When compared with ECG of 02-SEP-2023 14:19,  Atrial fibrillation has replaced Sinus rhythm  ST now depressed in Inferior leads  ST now depressed in Anterolateral leads  Nonspecific T wave abnormality now evident in Inferior leads    TTE Echo Complete w/o Contrast w/ Doppler (09.02.23 @ 08:38) >   Mild to Moderate mitral regurgitation.   EA reversal of the mitral inflow consistent with reduced compliance of the left ventricle.   The aortic valve is well visualized, appears mildly calcified. Valve opening seems to be normal.   Normal appearing tricuspid valve structure. Moderate (2+) tricuspid valve regurgitation.   Moderate pulmonary hypertension.   Normal appearing pulmonic valve structure. Mild pulmonic valvular regurgitation.   The left atrium is mildly dilated.   Estimated left ventricular ejection fraction is 60-65 %.   The left ventricle is normal in size, wall motion and contractility as seen in limited views.   Mild concentric left ventricular hypertrophy.   The right atrium appears borderline mildly dilated.   Normal appearing right ventricle structure and function.   IVC is dilated and not collapsing with inspiration.    Tele: AF

## 2023-09-10 LAB
A PHAGOCYTOPH DNA BLD QL NAA+PROBE: NEGATIVE — SIGNIFICANT CHANGE UP
E CHAFFEENSIS DNA BLD QL NAA+PROBE: NEGATIVE — SIGNIFICANT CHANGE UP
E EWINGII DNA SPEC QL NAA+PROBE: NEGATIVE — SIGNIFICANT CHANGE UP
EHRLICHIA DNA SPEC QL NAA+PROBE: NEGATIVE — SIGNIFICANT CHANGE UP

## 2023-09-10 PROCEDURE — 99232 SBSQ HOSP IP/OBS MODERATE 35: CPT

## 2023-09-10 PROCEDURE — 71250 CT THORAX DX C-: CPT | Mod: 26

## 2023-09-10 PROCEDURE — 71045 X-RAY EXAM CHEST 1 VIEW: CPT | Mod: 26

## 2023-09-10 RX ADMIN — LORATADINE 10 MILLIGRAM(S): 10 TABLET ORAL at 10:27

## 2023-09-10 RX ADMIN — ATORVASTATIN CALCIUM 20 MILLIGRAM(S): 80 TABLET, FILM COATED ORAL at 23:00

## 2023-09-10 RX ADMIN — DORZOLAMIDE HYDROCHLORIDE TIMOLOL MALEATE 1 DROP(S): 20; 5 SOLUTION/ DROPS OPHTHALMIC at 10:28

## 2023-09-10 RX ADMIN — Medication 50 MILLIGRAM(S): at 10:27

## 2023-09-10 RX ADMIN — DORZOLAMIDE HYDROCHLORIDE TIMOLOL MALEATE 1 DROP(S): 20; 5 SOLUTION/ DROPS OPHTHALMIC at 23:01

## 2023-09-10 RX ADMIN — Medication 30 MILLIGRAM(S): at 12:32

## 2023-09-10 RX ADMIN — Medication 50 MILLIGRAM(S): at 23:00

## 2023-09-10 RX ADMIN — CLOPIDOGREL BISULFATE 75 MILLIGRAM(S): 75 TABLET, FILM COATED ORAL at 10:27

## 2023-09-10 RX ADMIN — CHLORHEXIDINE GLUCONATE 1 APPLICATION(S): 213 SOLUTION TOPICAL at 10:25

## 2023-09-10 RX ADMIN — RIVAROXABAN 15 MILLIGRAM(S): KIT at 18:20

## 2023-09-10 RX ADMIN — Medication 30 MILLIGRAM(S): at 18:20

## 2023-09-10 RX ADMIN — Medication 30 MILLIGRAM(S): at 06:22

## 2023-09-10 NOTE — PROGRESS NOTE ADULT - SUBJECTIVE AND OBJECTIVE BOX
REASON FOR VISIT: AF    HPI:  83 y/o woman with a history of CAD, PCI (LAD, 8/2023), PAF, polycythemia admitted on 9/2/23 with fever and recurrence of AF with RVR.    9/3/23 Events noted , patient developed shortness of breath with hypoxia  with high febrile episode ,  was having nausea ,  some back pain , temp 104  symptoms improved over some time , no recurrence of SOB while in CCU   CT chest did not show CHF or pneumonia    9/4/23 patient had mild degree of sob with febrile episode last night , no chest pain or shortness of breath now   9/5/23 Patient is feeling nauseous , mild abdominal discomfort , in afib with rapid rate since this morning ,   no sob   9/6/23 Feels well complains of palpitations  9/7/23:  Fatigue, resolving cough; no pain / dyspnea  9/8/23:  No new complaints; slept well; no palpitations or dyspnea; remains afebrile.  9/9/23 Pt denies cp, breathing improved.  Feels fatigued and weak.  Tele: afib 90's-110 with bursts up to 140  9/10/23:  Fatigue but no palpitations.    CV MEDICATIONS  (STANDING):  atorvastatin 20 milliGRAM(s) Oral at bedtime  clopidogrel Tablet 75 milliGRAM(s) Oral daily  diltiazem    Tablet 30 milliGRAM(s) Oral every 6 hours  metoprolol tartrate 50 milliGRAM(s) Oral two times a day  rivaroxaban 15 milliGRAM(s) Oral with dinner    Vital Signs Last 24 Hrs  T(C): 36.6 (10 Sep 2023 08:00), Max: 36.8 (09 Sep 2023 20:30)  T(F): 97.8 (10 Sep 2023 08:00), Max: 98.3 (09 Sep 2023 20:30)  HR: 83 (10 Sep 2023 08:00) (73 - 95)  BP: 116/68 (10 Sep 2023 08:00) (102/62 - 116/68)  BP(mean): 68 (10 Sep 2023 06:15) (68 - 76)  RR: 18 (10 Sep 2023 08:00) (17 - 18)  SpO2: 98% (10 Sep 2023 08:00) (95% - 99%)  Patient On (Oxygen Delivery Method): room air    PHYSICAL EXAM:  Constitutional: NAD, awake and alert  Respiratory: Breath sounds are clear bilaterally  Cardiovascular: S1 and S2, irregular, normal rate  Gastrointestinal: Bowel Sounds present, soft, nontender.   Extremities: No edema.     LABS:                              TroponinI hsT: 105.32    12 Lead ECG (09.05.23 @ 07:42):   Atrial fibrillation with rapid ventricular response  Low voltage QRS  Nonspecific ST abnormality  Abnormal ECG  When compared with ECG of 02-SEP-2023 14:19,  Atrial fibrillation has replaced Sinus rhythm  ST now depressed in Inferior leads  ST now depressed in Anterolateral leads  Nonspecific T wave abnormality now evident in Inferior leads    TTE Echo Complete w/o Contrast w/ Doppler (09.02.23 @ 08:38) >   Mild to Moderate mitral regurgitation.   EA reversal of the mitral inflow consistent with reduced compliance of the left ventricle.   The aortic valve is well visualized, appears mildly calcified. Valve opening seems to be normal.   Normal appearing tricuspid valve structure. Moderate (2+) tricuspid valve regurgitation.   Moderate pulmonary hypertension.   Normal appearing pulmonic valve structure. Mild pulmonic valvular regurgitation.   The left atrium is mildly dilated.   Estimated left ventricular ejection fraction is 60-65 %.   The left ventricle is normal in size, wall motion and contractility as seen in limited views.   Mild concentric left ventricular hypertrophy.   The right atrium appears borderline mildly dilated.   Normal appearing right ventricle structure and function.   IVC is dilated and not collapsing with inspiration.    Tele: AF

## 2023-09-10 NOTE — PROGRESS NOTE ADULT - SUBJECTIVE AND OBJECTIVE BOX
81 y/o female with recent hospitalization for CAD, underwent LAD PCI, paroxysmal A.fib, thrombocythemia on hydroxyurea   Presented with palpitations and PASCAL at home   Also with fever, diarrhea   She was initially admitted to the floor, had an episode of A.fib with RVR, high fever of 104.5.    9/4 Case discussed on CCU rounds, afebrile for HIDA in am.  9/5 Case discussed on CCU rounds, HIDA- negative, still with low grade temps., and AF RVR.  9/6 - patient had tmax 102F, and patient had worsening afib rvr, cardizem dripped went up to 10, s/p IV tylenol with improvement of her rigors.  s/p blood culture   9/7 - no fever overnight, diltiazem drip off overnight, net negative 1 L.    9.8: no cp, no sob, c/o coughing   9.9: +anxiety, Tele: AFib Rvr 120/min, no cp, no sob  9.10: better rate control, Afib 80-90s          REVIEW OF SYSTEMS:    CONSTITUTIONAL: No weakness, No fevers or chills  ENT: No ear ache, No sorethroat  NECK: No pain, No stiffness  RESPIRATORY: No cough, No wheezing, No hemoptysis; No dyspnea  CARDIOVASCULAR: No chest pain, No palpitations  GASTROINTESTINAL: No abd pain, No nausea, No vomiting, No hematemesis, No diarrhea or constipation. No melena, No hematochezia.  GENITOURINARY: No dysuria, No  hematuria  NEUROLOGICAL: No diplopia, No paresthesia, No motor dysfunction  MUSCULOSKELETAL: No arthralgia, No myalgia  SKIN: No rashes, or lesions   PSYCH: no anxiety, no suicidal ideation    All other review of systems is negative unless indicated above    Vital Signs Last 24 Hrs  T(C): 36.6 (10 Sep 2023 08:00), Max: 36.8 (09 Sep 2023 20:30)  T(F): 97.8 (10 Sep 2023 08:00), Max: 98.3 (09 Sep 2023 20:30)  HR: 83 (10 Sep 2023 08:00) (73 - 95)  BP: 116/68 (10 Sep 2023 08:00) (102/62 - 116/68)  BP(mean): 68 (10 Sep 2023 06:15) (68 - 76)  RR: 18 (10 Sep 2023 08:00) (17 - 18)  SpO2: 98% (10 Sep 2023 08:00) (95% - 99%)    Parameters below as of 10 Sep 2023 08:00  Patient On (Oxygen Delivery Method): room air        PHYSICAL EXAM:    GENERAL: NAD  HEENT:  NC/AT, EOMI, PERRLA, No scleral icterus, Moist mucous membranes  NECK: Supple, No JVD  CNS:  Alert & Oriented X3, Motor Strength 5/5 B/L upper and lower extremities; DTRs 2+ intact   LUNG: Normal Breath sounds, Clear to auscultation bilaterally, No rales, No rhonchi, No wheezing  HEART: RRR; No murmurs, No rubs  ABDOMEN: +BS, ST/ND/NT  GENITOURINARY: Voiding, Bladder not distended  EXTREMITIES:  2+ Peripheral Pulses, No clubbing, No cyanosis, No tibial edema  MUSCULOSKELTAL: Joints normal ROM, No TTP, No effusion  VAGINAL: deferred  SKIN: no rashes  RECTAL: deferred, not indicated  BREAST: deferred                          11.6   7.43  )-----------( 368      ( 08 Sep 2023 05:34 )             35.3     09-08    139  |  106  |  16  ----------------------------<  113<H>  3.9   |  28  |  0.91    Ca    8.7      08 Sep 2023 05:34  Phos  3.3     09-08  Mg     2.2     09-08      Vancomycin levels:   Cultures:     MEDICATIONS  (STANDING):  acetaminophen   IVPB .. 1000 milliGRAM(s) IV Intermittent once  atorvastatin 20 milliGRAM(s) Oral at bedtime  chlorhexidine 4% Liquid 1 Application(s) Topical <User Schedule>  clopidogrel Tablet 75 milliGRAM(s) Oral daily  diltiazem    Tablet 30 milliGRAM(s) Oral every 6 hours  dorzolamide 2%/timolol 0.5% Ophthalmic Solution 1 Drop(s) Both EYES two times a day  hydroxyurea 500 milliGRAM(s) Oral <User Schedule>  loratadine 10 milliGRAM(s) Oral daily  metoprolol tartrate 25 milliGRAM(s) Oral two times a day  rivaroxaban 15 milliGRAM(s) Oral with dinner    MEDICATIONS  (PRN):  acetaminophen     Tablet .. 650 milliGRAM(s) Oral every 6 hours PRN Temp greater or equal to 38C (100.4F), Mild Pain (1 - 3)  aluminum hydroxide/magnesium hydroxide/simethicone Suspension 30 milliLiter(s) Oral every 4 hours PRN Dyspepsia  guaiFENesin Oral Liquid (Sugar-Free) 100 milliGRAM(s) Oral every 6 hours PRN Cough  ondansetron Injectable 4 milliGRAM(s) IV Push every 8 hours PRN Nausea and/or Vomiting      all labs reviewed  all imaging reviewed        81 y/o F with hx of CAD , recent LAD PCI about 1 wek ago, afib, , thrombocythemia on hydroxyurea, present with palpitation , found to be in afib rvr, with fever.  Started diltiazem drip and antibiotic.  initial infectious workup unremarkable, CT showed gallbladder distention, HIDA negative.  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         #afib rvr  better control   Increase Lopressor as needed   c/w  Cardizem, Xarelto     #CAD s/p recent PCI   Trops leveled  c/w Plavix/Statins  on Xarelto for Afib    #EBV acute infection   off Abx per ID  EBvirus IgM +  outpatient hematology to r/o lymphoproliferative disorders as EBV is known to be associated with     #mod pulm HTN     #Thrombocytosis  on Hydrea       Mobility: PT consulted    GOC: full code                83 y/o female with recent hospitalization for CAD, underwent LAD PCI, paroxysmal A.fib, thrombocythemia on hydroxyurea   Presented with palpitations and PASCAL at home   Also with fever, diarrhea   She was initially admitted to the floor, had an episode of A.fib with RVR, high fever of 104.5.    9/4 Case discussed on CCU rounds, afebrile for HIDA in am.  9/5 Case discussed on CCU rounds, HIDA- negative, still with low grade temps., and AF RVR.  9/6 - patient had tmax 102F, and patient had worsening afib rvr, cardizem dripped went up to 10, s/p IV tylenol with improvement of her rigors.  s/p blood culture   9/7 - no fever overnight, diltiazem drip off overnight, net negative 1 L.    9.8: no cp, no sob, c/o coughing   9.9: +anxiety, Tele: AFib Rvr 120/min, no cp, no sob  9.10: better rate control, Afib 80-90s  +coughing           REVIEW OF SYSTEMS:    CONSTITUTIONAL: No weakness, No fevers or chills  ENT: No ear ache, No sorethroat  NECK: No pain, No stiffness  RESPIRATORY: No cough, No wheezing, No hemoptysis; No dyspnea  CARDIOVASCULAR: No chest pain, No palpitations  GASTROINTESTINAL: No abd pain, No nausea, No vomiting, No hematemesis, No diarrhea or constipation. No melena, No hematochezia.  GENITOURINARY: No dysuria, No  hematuria  NEUROLOGICAL: No diplopia, No paresthesia, No motor dysfunction  MUSCULOSKELETAL: No arthralgia, No myalgia  SKIN: No rashes, or lesions   PSYCH: no anxiety, no suicidal ideation    All other review of systems is negative unless indicated above    Vital Signs Last 24 Hrs  T(C): 36.6 (10 Sep 2023 08:00), Max: 36.8 (09 Sep 2023 20:30)  T(F): 97.8 (10 Sep 2023 08:00), Max: 98.3 (09 Sep 2023 20:30)  HR: 83 (10 Sep 2023 08:00) (73 - 95)  BP: 116/68 (10 Sep 2023 08:00) (102/62 - 116/68)  BP(mean): 68 (10 Sep 2023 06:15) (68 - 76)  RR: 18 (10 Sep 2023 08:00) (17 - 18)  SpO2: 98% (10 Sep 2023 08:00) (95% - 99%)    Parameters below as of 10 Sep 2023 08:00  Patient On (Oxygen Delivery Method): room air        PHYSICAL EXAM:    GENERAL: NAD  HEENT:  NC/AT, EOMI, PERRLA, No scleral icterus, Moist mucous membranes  NECK: Supple, No JVD  CNS:  Alert & Oriented X3, Motor Strength 5/5 B/L upper and lower extremities; DTRs 2+ intact   LUNG: Normal Breath sounds, Clear to auscultation bilaterally, No rales, No rhonchi, No wheezing  HEART: RRR; No murmurs, No rubs  ABDOMEN: +BS, ST/ND/NT  GENITOURINARY: Voiding, Bladder not distended  EXTREMITIES:  2+ Peripheral Pulses, No clubbing, No cyanosis, No tibial edema  MUSCULOSKELTAL: Joints normal ROM, No TTP, No effusion  VAGINAL: deferred  SKIN: no rashes  RECTAL: deferred, not indicated  BREAST: deferred                          11.6   7.43  )-----------( 368      ( 08 Sep 2023 05:34 )             35.3     09-08    139  |  106  |  16  ----------------------------<  113<H>  3.9   |  28  |  0.91    Ca    8.7      08 Sep 2023 05:34  Phos  3.3     09-08  Mg     2.2     09-08      Vancomycin levels:   Cultures:     MEDICATIONS  (STANDING):  acetaminophen   IVPB .. 1000 milliGRAM(s) IV Intermittent once  atorvastatin 20 milliGRAM(s) Oral at bedtime  chlorhexidine 4% Liquid 1 Application(s) Topical <User Schedule>  clopidogrel Tablet 75 milliGRAM(s) Oral daily  diltiazem    Tablet 30 milliGRAM(s) Oral every 6 hours  dorzolamide 2%/timolol 0.5% Ophthalmic Solution 1 Drop(s) Both EYES two times a day  hydroxyurea 500 milliGRAM(s) Oral <User Schedule>  loratadine 10 milliGRAM(s) Oral daily  metoprolol tartrate 25 milliGRAM(s) Oral two times a day  rivaroxaban 15 milliGRAM(s) Oral with dinner    MEDICATIONS  (PRN):  acetaminophen     Tablet .. 650 milliGRAM(s) Oral every 6 hours PRN Temp greater or equal to 38C (100.4F), Mild Pain (1 - 3)  aluminum hydroxide/magnesium hydroxide/simethicone Suspension 30 milliLiter(s) Oral every 4 hours PRN Dyspepsia  guaiFENesin Oral Liquid (Sugar-Free) 100 milliGRAM(s) Oral every 6 hours PRN Cough  ondansetron Injectable 4 milliGRAM(s) IV Push every 8 hours PRN Nausea and/or Vomiting      all labs reviewed  all imaging reviewed        83 y/o F with hx of CAD , recent LAD PCI about 1 wek ago, afib, , thrombocythemia on hydroxyurea, present with palpitation , found to be in afib rvr, with fever.  Started diltiazem drip and antibiotic.  initial infectious workup unremarkable, CT showed gallbladder distention, HIDA negative.  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         1. afib rvr  better control   Increase Lopressor as needed   c/w  Cardizem, Xarelto     2. LLL infiltrate:  r/o Pna  will check CT Chest   Ceftriaxone IV     #CAD s/p recent PCI   Trops leveled  c/w Plavix/Statins  on Xarelto for Afib    #EBV acute infection   off Abx per ID  EBvirus IgM +  outpatient hematology to r/o lymphoproliferative disorders as EBV is known to be associated with     #mod pulm HTN     #Thrombocytosis  on Hydrea       Mobility: PT consulted    GOC: full code

## 2023-09-10 NOTE — PROGRESS NOTE ADULT - PROBLEM SELECTOR PLAN 1
Heart rate is reasonably well-controlled at rest -- may need to uptitrate diltiazem if HR increases with PT; continue Xarelto.

## 2023-09-10 NOTE — PROGRESS NOTE ADULT - PROBLEM SELECTOR PLAN 2
Single vessel CAD (80% pLAD) s/p NELSON 8/23/23; no angina; continue medical management with Plavix, metoprolol, and atorvastatin.

## 2023-09-11 ENCOUNTER — TRANSCRIPTION ENCOUNTER (OUTPATIENT)
Age: 83
End: 2023-09-11

## 2023-09-11 LAB
CULTURE RESULTS: SIGNIFICANT CHANGE UP
CULTURE RESULTS: SIGNIFICANT CHANGE UP
SPECIMEN SOURCE: SIGNIFICANT CHANGE UP
SPECIMEN SOURCE: SIGNIFICANT CHANGE UP
WNV IGG TITR FLD: NEGATIVE — SIGNIFICANT CHANGE UP
WNV IGM SPEC QL: NEGATIVE — SIGNIFICANT CHANGE UP

## 2023-09-11 PROCEDURE — 99223 1ST HOSP IP/OBS HIGH 75: CPT

## 2023-09-11 PROCEDURE — 99232 SBSQ HOSP IP/OBS MODERATE 35: CPT

## 2023-09-11 RX ORDER — FUROSEMIDE 40 MG
20 TABLET ORAL DAILY
Refills: 0 | Status: DISCONTINUED | OUTPATIENT
Start: 2023-09-11 | End: 2023-09-15

## 2023-09-11 RX ORDER — CEFTRIAXONE 500 MG/1
1000 INJECTION, POWDER, FOR SOLUTION INTRAMUSCULAR; INTRAVENOUS EVERY 24 HOURS
Refills: 0 | Status: DISCONTINUED | OUTPATIENT
Start: 2023-09-11 | End: 2023-09-11

## 2023-09-11 RX ORDER — CEFTRIAXONE 500 MG/1
1000 INJECTION, POWDER, FOR SOLUTION INTRAMUSCULAR; INTRAVENOUS EVERY 24 HOURS
Refills: 0 | Status: COMPLETED | OUTPATIENT
Start: 2023-09-11 | End: 2023-09-15

## 2023-09-11 RX ADMIN — Medication 5 MILLIGRAM(S): at 20:02

## 2023-09-11 RX ADMIN — CLOPIDOGREL BISULFATE 75 MILLIGRAM(S): 75 TABLET, FILM COATED ORAL at 09:09

## 2023-09-11 RX ADMIN — Medication 50 MILLIGRAM(S): at 09:09

## 2023-09-11 RX ADMIN — DORZOLAMIDE HYDROCHLORIDE TIMOLOL MALEATE 1 DROP(S): 20; 5 SOLUTION/ DROPS OPHTHALMIC at 11:16

## 2023-09-11 RX ADMIN — LORATADINE 10 MILLIGRAM(S): 10 TABLET ORAL at 09:08

## 2023-09-11 RX ADMIN — Medication 50 MILLIGRAM(S): at 20:03

## 2023-09-11 RX ADMIN — Medication 30 MILLIGRAM(S): at 06:00

## 2023-09-11 RX ADMIN — Medication 30 MILLIGRAM(S): at 12:41

## 2023-09-11 RX ADMIN — RIVAROXABAN 15 MILLIGRAM(S): KIT at 18:55

## 2023-09-11 RX ADMIN — CHLORHEXIDINE GLUCONATE 1 APPLICATION(S): 213 SOLUTION TOPICAL at 09:10

## 2023-09-11 RX ADMIN — Medication 30 MILLIGRAM(S): at 23:33

## 2023-09-11 RX ADMIN — Medication 100 MILLIGRAM(S): at 09:09

## 2023-09-11 RX ADMIN — Medication 30 MILLIGRAM(S): at 18:55

## 2023-09-11 RX ADMIN — DORZOLAMIDE HYDROCHLORIDE TIMOLOL MALEATE 1 DROP(S): 20; 5 SOLUTION/ DROPS OPHTHALMIC at 20:02

## 2023-09-11 RX ADMIN — HYDROXYUREA 500 MILLIGRAM(S): 500 CAPSULE ORAL at 11:17

## 2023-09-11 RX ADMIN — Medication 30 MILLIGRAM(S): at 00:24

## 2023-09-11 RX ADMIN — CEFTRIAXONE 1000 MILLIGRAM(S): 500 INJECTION, POWDER, FOR SOLUTION INTRAMUSCULAR; INTRAVENOUS at 11:29

## 2023-09-11 RX ADMIN — ATORVASTATIN CALCIUM 20 MILLIGRAM(S): 80 TABLET, FILM COATED ORAL at 20:03

## 2023-09-11 NOTE — PROGRESS NOTE ADULT - PROBLEM SELECTOR PLAN 2
Single vessel CAD (80% pLAD) s/p NELSON 8/23/23; no angina; continue medical management with Plavix, metoprolol, and atorvastatin. Single vessel CAD (80% pLAD) s/p NELSON 8/23/23; no angina;   Troponin elevated and trending down likely 2/2 infection; continue medical management with Plavix, metoprolol, and atorvastatin.

## 2023-09-11 NOTE — PROGRESS NOTE ADULT - PROBLEM SELECTOR PLAN 1
HR controlled   Need to assess HR with activity   continue Xarelto.  OPT telemetry HR 80-90  Will await for full resolution of infection reassess HR/AF consider EP as OPT   Need to assess HR with activity   continue Xarelto.  OPT telemetry

## 2023-09-11 NOTE — PROGRESS NOTE ADULT - SUBJECTIVE AND OBJECTIVE BOX
REASON FOR VISIT: AF    HPI:  81 y/o woman with a history of CAD, PCI (LAD, 8/2023), PAF, polycythemia admitted on 9/2/23 with fever and recurrence of AF with RVR.    9/3/23 Events noted , patient developed shortness of breath with hypoxia  with high febrile episode ,  was having nausea ,  some back pain , temp 104  symptoms improved over some time , no recurrence of SOB while in CCU   CT chest did not show CHF or pneumonia    9/4/23 patient had mild degree of sob with febrile episode last night , no chest pain or shortness of breath now   9/5/23 Patient is feeling nauseous , mild abdominal discomfort , in afib with rapid rate since this morning ,   no sob   9/6/23 Feels well complains of palpitations  9/7/23:  Fatigue, resolving cough; no pain / dyspnea  9/8/23:  No new complaints; slept well; no palpitations or dyspnea; remains afebrile.  9/9/23 Pt denies cp, breathing improved.  Feels fatigued and weak.  Tele: afib 90's-110 with bursts up to 140  9/10/23:  Fatigue but no palpitations.  9/11/23: Awake alert feels weak but no CP/SOB, tele AF 80-90     MEDICATIONS  (STANDING):  acetaminophen   IVPB .. 1000 milliGRAM(s) IV Intermittent once  atorvastatin 20 milliGRAM(s) Oral at bedtime  cefTRIAXone Injectable. 1000 milliGRAM(s) IV Push every 24 hours  chlorhexidine 4% Liquid 1 Application(s) Topical <User Schedule>  clopidogrel Tablet 75 milliGRAM(s) Oral daily  diltiazem    Tablet 30 milliGRAM(s) Oral every 6 hours  dorzolamide 2%/timolol 0.5% Ophthalmic Solution 1 Drop(s) Both EYES two times a day  furosemide    Tablet 20 milliGRAM(s) Oral daily  hydroxyurea 500 milliGRAM(s) Oral <User Schedule>  loratadine 10 milliGRAM(s) Oral daily  metoprolol tartrate 50 milliGRAM(s) Oral two times a day  rivaroxaban 15 milliGRAM(s) Oral with dinner    MEDICATIONS  (PRN):  acetaminophen     Tablet .. 650 milliGRAM(s) Oral every 6 hours PRN Temp greater or equal to 38C (100.4F), Mild Pain (1 - 3)  aluminum hydroxide/magnesium hydroxide/simethicone Suspension 30 milliLiter(s) Oral every 4 hours PRN Dyspepsia  guaiFENesin Oral Liquid (Sugar-Free) 100 milliGRAM(s) Oral every 6 hours PRN Cough  melatonin 5 milliGRAM(s) Oral at bedtime PRN Insomnia  ondansetron Injectable 4 milliGRAM(s) IV Push every 8 hours PRN Nausea and/or Vomiting    Vital Signs Last 24 Hrs  T(C): 36.5 (11 Sep 2023 07:37), Max: 36.6 (10 Sep 2023 21:04)  T(F): 97.7 (11 Sep 2023 07:37), Max: 97.8 (10 Sep 2023 21:04)  HR: 74 (11 Sep 2023 07:37) (74 - 88)  BP: 107/42 (11 Sep 2023 07:37) (103/84 - 113/69)  BP(mean): --  RR: 19 (11 Sep 2023 07:37) (18 - 19)  SpO2: 100% (11 Sep 2023 07:37) (98% - 100%)    Parameters below as of 11 Sep 2023 07:37  Patient On (Oxygen Delivery Method): nasal cannula  O2 Flow (L/min): 1        PHYSICAL EXAM:  Constitutional: NAD, awake and alert  Respiratory: Breath sounds are clear bilaterally  Cardiovascular: S1 and S2, irregular, normal rate  Gastrointestinal: Bowel Sounds present, soft, nontender.   Extremities: No edema.     LABS:                              TroponinI hsT: 105.32    12 Lead ECG (09.05.23 @ 07:42):   Atrial fibrillation with rapid ventricular response  Low voltage QRS  Nonspecific ST abnormality  Abnormal ECG  When compared with ECG of 02-SEP-2023 14:19,  Atrial fibrillation has replaced Sinus rhythm  ST now depressed in Inferior leads  ST now depressed in Anterolateral leads  Nonspecific T wave abnormality now evident in Inferior leads    TTE Echo Complete w/o Contrast w/ Doppler (09.02.23 @ 08:38) >   Mild to Moderate mitral regurgitation.   EA reversal of the mitral inflow consistent with reduced compliance of the left ventricle.   The aortic valve is well visualized, appears mildly calcified. Valve opening seems to be normal.   Normal appearing tricuspid valve structure. Moderate (2+) tricuspid valve regurgitation.   Moderate pulmonary hypertension.   Normal appearing pulmonic valve structure. Mild pulmonic valvular regurgitation.   The left atrium is mildly dilated.   Estimated left ventricular ejection fraction is 60-65 %.   The left ventricle is normal in size, wall motion and contractility as seen in limited views.   Mild concentric left ventricular hypertrophy.   The right atrium appears borderline mildly dilated.   Normal appearing right ventricle structure and function.   IVC is dilated and not collapsing with inspiration.    Tele: AF

## 2023-09-11 NOTE — PROGRESS NOTE ADULT - SUBJECTIVE AND OBJECTIVE BOX
83 y/o female with recent hospitalization for CAD, underwent LAD PCI, paroxysmal A.fib, thrombocythemia on hydroxyurea   Presented with palpitations and PASCAL at home   Also with fever, diarrhea   She was initially admitted to the floor, had an episode of A.fib with RVR, high fever of 104.5.    9/4 Case discussed on CCU rounds, afebrile for HIDA in am.  9/5 Case discussed on CCU rounds, HIDA- negative, still with low grade temps., and AF RVR.  9/6 - patient had tmax 102F, and patient had worsening afib rvr, cardizem dripped went up to 10, s/p IV tylenol with improvement of her rigors.  s/p blood culture   9/7 - no fever overnight, diltiazem drip off overnight, net negative 1 L.    9.8: no cp, no sob, c/o coughing   9.9: +anxiety, Tele: AFib Rvr 120/min, no cp, no sob  9.10: better rate control, Afib 80-90s  +coughing   9.11: c/o coughing, +diaphoresis  Tele: Afib 80s          REVIEW OF SYSTEMS:    CONSTITUTIONAL: No weakness, No fevers or chills  ENT: No ear ache, No sorethroat  NECK: No pain, No stiffness  RESPIRATORY: No cough, No wheezing, No hemoptysis; No dyspnea  CARDIOVASCULAR: No chest pain, No palpitations  GASTROINTESTINAL: No abd pain, No nausea, No vomiting, No hematemesis, No diarrhea or constipation. No melena, No hematochezia.  GENITOURINARY: No dysuria, No  hematuria  NEUROLOGICAL: No diplopia, No paresthesia, No motor dysfunction  MUSCULOSKELETAL: No arthralgia, No myalgia  SKIN: No rashes, or lesions   PSYCH: no anxiety, no suicidal ideation    All other review of systems is negative unless indicated above    Vital Signs Last 24 Hrs  T(C): 36.5 (11 Sep 2023 07:37), Max: 36.6 (10 Sep 2023 21:04)  T(F): 97.7 (11 Sep 2023 07:37), Max: 97.8 (10 Sep 2023 21:04)  HR: 74 (11 Sep 2023 07:37) (74 - 88)  BP: 107/42 (11 Sep 2023 07:37) (103/84 - 113/69)  BP(mean): --  RR: 19 (11 Sep 2023 07:37) (18 - 19)  SpO2: 100% (11 Sep 2023 07:37) (98% - 100%)    Parameters below as of 11 Sep 2023 07:37  Patient On (Oxygen Delivery Method): nasal cannula  O2 Flow (L/min): 1      PHYSICAL EXAM:    GENERAL: NAD  HEENT:  NC/AT, EOMI, PERRLA, No scleral icterus, Moist mucous membranes  NECK: Supple, No JVD  CNS:  Alert & Oriented X3, Motor Strength 5/5 B/L upper and lower extremities; DTRs 2+ intact   LUNG: Normal Breath sounds, Clear to auscultation bilaterally, No rales, No rhonchi, No wheezing  HEART: RRR; No murmurs, No rubs  ABDOMEN: +BS, ST/ND/NT  GENITOURINARY: Voiding, Bladder not distended  EXTREMITIES:  2+ Peripheral Pulses, No clubbing, No cyanosis, No tibial edema  MUSCULOSKELTAL: Joints normal ROM, No TTP, No effusion  VAGINAL: deferred  SKIN: no rashes  RECTAL: deferred, not indicated  BREAST: deferred                          11.6   7.43  )-----------( 368      ( 08 Sep 2023 05:34 )             35.3     09-08    139  |  106  |  16  ----------------------------<  113<H>  3.9   |  28  |  0.91    Ca    8.7      08 Sep 2023 05:34  Phos  3.3     09-08  Mg     2.2     09-08      Vancomycin levels:   Cultures:     MEDICATIONS  (STANDING):  acetaminophen   IVPB .. 1000 milliGRAM(s) IV Intermittent once  atorvastatin 20 milliGRAM(s) Oral at bedtime  chlorhexidine 4% Liquid 1 Application(s) Topical <User Schedule>  clopidogrel Tablet 75 milliGRAM(s) Oral daily  diltiazem    Tablet 30 milliGRAM(s) Oral every 6 hours  dorzolamide 2%/timolol 0.5% Ophthalmic Solution 1 Drop(s) Both EYES two times a day  hydroxyurea 500 milliGRAM(s) Oral <User Schedule>  loratadine 10 milliGRAM(s) Oral daily  metoprolol tartrate 25 milliGRAM(s) Oral two times a day  rivaroxaban 15 milliGRAM(s) Oral with dinner    MEDICATIONS  (PRN):  acetaminophen     Tablet .. 650 milliGRAM(s) Oral every 6 hours PRN Temp greater or equal to 38C (100.4F), Mild Pain (1 - 3)  aluminum hydroxide/magnesium hydroxide/simethicone Suspension 30 milliLiter(s) Oral every 4 hours PRN Dyspepsia  guaiFENesin Oral Liquid (Sugar-Free) 100 milliGRAM(s) Oral every 6 hours PRN Cough  ondansetron Injectable 4 milliGRAM(s) IV Push every 8 hours PRN Nausea and/or Vomiting      all labs reviewed  all imaging reviewed        83 y/o F with hx of CAD , recent LAD PCI about 1 wek ago, afib, , thrombocythemia on hydroxyurea, present with palpitation , found to be in afib rvr, with fever.  Started diltiazem drip and antibiotic.  initial infectious workup unremarkable, CT showed gallbladder distention, HIDA negative.  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         1. Afib rvr  better control   Increase Lopressor as needed   c/w  Cardizem, Xarelto     2. Pneumonia:  will check CT Chest : noted  Ceftriaxone IV day#1    #CAD s/p recent PCI   Trops leveled  c/w Plavix/Statins  on Xarelto for Afib    #EBV acute infection   off Abx per ID  EBvirus IgM +  outpatient hematology to r/o lymphoproliferative disorders as EBV is known to be associated with     #mod pulm HTN     #Thrombocytosis  on Hydrea       Mobility: PT consulted    GOC: full code

## 2023-09-11 NOTE — DISCHARGE NOTE NURSING/CASE MANAGEMENT/SOCIAL WORK - NSDCFUADDAPPT_GEN_ALL_CORE_FT
Follow up with Dr. Case at [facility] on 9/20/23 at 3pm  Follow up with Dr. Case at [facility] on 9/20/23  Follow up with Dr. Case at [facility] on 9/20/23   Faxed discharge papers to Dr. Jade  [phone]

## 2023-09-11 NOTE — DISCHARGE NOTE NURSING/CASE MANAGEMENT/SOCIAL WORK - NSDCPEFALRISK_GEN_ALL_CORE
For information on Fall & Injury Prevention, visit: [email or web address] OR  [email or web address] OR  https://www.cdc.gov/steadi/patient.html

## 2023-09-11 NOTE — DISCHARGE NOTE NURSING/CASE MANAGEMENT/SOCIAL WORK - PATIENT PORTAL LINK FT
You can access the FollowMyHealth Patient Portal offered by [facility] by registering at the following website: [email or web address]. By joining [employer]’s FollowMyHealth portal, you will also be able to view your health information using other applications (apps) compatible with our system.

## 2023-09-12 LAB
R RICKETTSI AB SER-ACNC: NEGATIVE — SIGNIFICANT CHANGE UP
R RICKETTSI IGM SER-ACNC: 0.34 INDEX — SIGNIFICANT CHANGE UP (ref 0–0.89)
RICK SF IGG TITR SER IF: NEGATIVE — SIGNIFICANT CHANGE UP
RICK SF IGM TITR SER IF: 0.34 INDEX — SIGNIFICANT CHANGE UP (ref 0–0.89)

## 2023-09-12 PROCEDURE — 99233 SBSQ HOSP IP/OBS HIGH 50: CPT

## 2023-09-12 RX ORDER — SODIUM CHLORIDE 9 MG/ML
500 INJECTION INTRAMUSCULAR; INTRAVENOUS; SUBCUTANEOUS
Refills: 0 | Status: DISCONTINUED | OUTPATIENT
Start: 2023-09-12 | End: 2023-09-14

## 2023-09-12 RX ORDER — DIGOXIN 250 MCG
250 TABLET ORAL ONCE
Refills: 0 | Status: COMPLETED | OUTPATIENT
Start: 2023-09-12 | End: 2023-09-12

## 2023-09-12 RX ORDER — ALPRAZOLAM 0.25 MG
0.25 TABLET ORAL THREE TIMES A DAY
Refills: 0 | Status: DISCONTINUED | OUTPATIENT
Start: 2023-09-12 | End: 2023-09-15

## 2023-09-12 RX ORDER — DIGOXIN 250 MCG
125 TABLET ORAL DAILY
Refills: 0 | Status: DISCONTINUED | OUTPATIENT
Start: 2023-09-13 | End: 2023-09-15

## 2023-09-12 RX ADMIN — DORZOLAMIDE HYDROCHLORIDE TIMOLOL MALEATE 1 DROP(S): 20; 5 SOLUTION/ DROPS OPHTHALMIC at 20:48

## 2023-09-12 RX ADMIN — LORATADINE 10 MILLIGRAM(S): 10 TABLET ORAL at 10:29

## 2023-09-12 RX ADMIN — ATORVASTATIN CALCIUM 20 MILLIGRAM(S): 80 TABLET, FILM COATED ORAL at 20:29

## 2023-09-12 RX ADMIN — Medication 30 MILLIGRAM(S): at 06:15

## 2023-09-12 RX ADMIN — Medication 250 MICROGRAM(S): at 14:46

## 2023-09-12 RX ADMIN — SODIUM CHLORIDE 100 MILLILITER(S): 9 INJECTION INTRAMUSCULAR; INTRAVENOUS; SUBCUTANEOUS at 14:43

## 2023-09-12 RX ADMIN — SODIUM CHLORIDE 100 MILLILITER(S): 9 INJECTION INTRAMUSCULAR; INTRAVENOUS; SUBCUTANEOUS at 19:04

## 2023-09-12 RX ADMIN — Medication 650 MILLIGRAM(S): at 02:55

## 2023-09-12 RX ADMIN — CLOPIDOGREL BISULFATE 75 MILLIGRAM(S): 75 TABLET, FILM COATED ORAL at 10:34

## 2023-09-12 RX ADMIN — SODIUM CHLORIDE 100 MILLILITER(S): 9 INJECTION INTRAMUSCULAR; INTRAVENOUS; SUBCUTANEOUS at 19:30

## 2023-09-12 RX ADMIN — CHLORHEXIDINE GLUCONATE 1 APPLICATION(S): 213 SOLUTION TOPICAL at 10:29

## 2023-09-12 RX ADMIN — Medication 5 MILLIGRAM(S): at 20:29

## 2023-09-12 RX ADMIN — Medication 100 MILLIGRAM(S): at 20:28

## 2023-09-12 RX ADMIN — DORZOLAMIDE HYDROCHLORIDE TIMOLOL MALEATE 1 DROP(S): 20; 5 SOLUTION/ DROPS OPHTHALMIC at 10:27

## 2023-09-12 RX ADMIN — RIVAROXABAN 15 MILLIGRAM(S): KIT at 17:48

## 2023-09-12 RX ADMIN — CEFTRIAXONE 1000 MILLIGRAM(S): 500 INJECTION, POWDER, FOR SOLUTION INTRAMUSCULAR; INTRAVENOUS at 10:41

## 2023-09-12 RX ADMIN — Medication 50 MILLIGRAM(S): at 20:30

## 2023-09-12 NOTE — PROGRESS NOTE ADULT - PROBLEM SELECTOR PLAN 2
Single vessel CAD (80% pLAD) s/p NELSON 8/23/23; no angina;   Troponin elevated and trending down likely 2/2 infection; continue medical management with Plavix, metoprolol, and atorvastatin.

## 2023-09-12 NOTE — PROGRESS NOTE ADULT - SUBJECTIVE AND OBJECTIVE BOX
81 y/o female with recent hospitalization for CAD, underwent LAD PCI, paroxysmal A.fib, thrombocythemia on hydroxyurea   Presented with palpitations and PASCAL at home   Also with fever, diarrhea   She was initially admitted to the floor, had an episode of A.fib with RVR, high fever of 104.5.    9/4 Case discussed on CCU rounds, afebrile for HIDA in am.  9/5 Case discussed on CCU rounds, HIDA- negative, still with low grade temps., and AF RVR.  9/6 - patient had tmax 102F, and patient had worsening afib rvr, cardizem dripped went up to 10, s/p IV tylenol with improvement of her rigors.  s/p blood culture   9/7 - no fever overnight, diltiazem drip off overnight, net negative 1 L.    9.8: no cp, no sob, c/o coughing   9.9: +anxiety, Tele: AFib Rvr 120/min, no cp, no sob  9.10: better rate control, Afib 80-90s  +coughing   9.11: c/o coughing, +diaphoresis  Tele: Afib 80s  9.12: BP low today, Afib: 100s with bursts to 130/min at rest   +anxiety           REVIEW OF SYSTEMS:    CONSTITUTIONAL: No weakness, No fevers or chills  ENT: No ear ache, No sorethroat  NECK: No pain, No stiffness  RESPIRATORY: No cough, No wheezing, No hemoptysis; No dyspnea  CARDIOVASCULAR: No chest pain, No palpitations  GASTROINTESTINAL: No abd pain, No nausea, No vomiting, No hematemesis, No diarrhea or constipation. No melena, No hematochezia.  GENITOURINARY: No dysuria, No  hematuria  NEUROLOGICAL: No diplopia, No paresthesia, No motor dysfunction  MUSCULOSKELETAL: No arthralgia, No myalgia  SKIN: No rashes, or lesions   PSYCH: no anxiety, no suicidal ideation    All other review of systems is negative unless indicated above    Vital Signs Last 24 Hrs  T(C): 36.5 (12 Sep 2023 09:11), Max: 36.5 (12 Sep 2023 09:11)  T(F): 97.7 (12 Sep 2023 09:11), Max: 97.7 (12 Sep 2023 09:11)  HR: 67 (12 Sep 2023 09:11) (67 - 114)  BP: 86/44 (12 Sep 2023 09:00) (86/44 - 110/77)  BP(mean): --  RR: 19 (12 Sep 2023 09:11) (18 - 19)  SpO2: 99% (12 Sep 2023 09:11) (95% - 99%)    Parameters below as of 12 Sep 2023 09:11  Patient On (Oxygen Delivery Method): nasal cannula  O2 Flow (L/min): 2        PHYSICAL EXAM:    GENERAL: NAD  HEENT:  NC/AT, EOMI, PERRLA, No scleral icterus, Moist mucous membranes  NECK: Supple, No JVD  CNS:  Alert & Oriented X3, Motor Strength 5/5 B/L upper and lower extremities; DTRs 2+ intact   LUNG: Normal Breath sounds, Clear to auscultation bilaterally, No rales, No rhonchi, No wheezing  HEART: RRR; No murmurs, No rubs  ABDOMEN: +BS, ST/ND/NT  GENITOURINARY: Voiding, Bladder not distended  EXTREMITIES:  2+ Peripheral Pulses, No clubbing, No cyanosis, No tibial edema  MUSCULOSKELTAL: Joints normal ROM, No TTP, No effusion  VAGINAL: deferred  SKIN: no rashes  RECTAL: deferred, not indicated  BREAST: deferred                          11.6   7.43  )-----------( 368      ( 08 Sep 2023 05:34 )             35.3     09-08    139  |  106  |  16  ----------------------------<  113<H>  3.9   |  28  |  0.91    Ca    8.7      08 Sep 2023 05:34  Phos  3.3     09-08  Mg     2.2     09-08      Vancomycin levels:   Cultures:     MEDICATIONS  (STANDING):  acetaminophen   IVPB .. 1000 milliGRAM(s) IV Intermittent once  atorvastatin 20 milliGRAM(s) Oral at bedtime  chlorhexidine 4% Liquid 1 Application(s) Topical <User Schedule>  clopidogrel Tablet 75 milliGRAM(s) Oral daily  diltiazem    Tablet 30 milliGRAM(s) Oral every 6 hours  dorzolamide 2%/timolol 0.5% Ophthalmic Solution 1 Drop(s) Both EYES two times a day  hydroxyurea 500 milliGRAM(s) Oral <User Schedule>  loratadine 10 milliGRAM(s) Oral daily  metoprolol tartrate 25 milliGRAM(s) Oral two times a day  rivaroxaban 15 milliGRAM(s) Oral with dinner    MEDICATIONS  (PRN):  acetaminophen     Tablet .. 650 milliGRAM(s) Oral every 6 hours PRN Temp greater or equal to 38C (100.4F), Mild Pain (1 - 3)  aluminum hydroxide/magnesium hydroxide/simethicone Suspension 30 milliLiter(s) Oral every 4 hours PRN Dyspepsia  guaiFENesin Oral Liquid (Sugar-Free) 100 milliGRAM(s) Oral every 6 hours PRN Cough  ondansetron Injectable 4 milliGRAM(s) IV Push every 8 hours PRN Nausea and/or Vomiting      all labs reviewed  all imaging reviewed        81 y/o F with hx of CAD , recent LAD PCI about 1 wek ago, afib, , thrombocythemia on hydroxyurea, present with palpitation , found to be in afib rvr, with fever.  Started diltiazem drip and antibiotic.  initial infectious workup unremarkable, CT showed gallbladder distention, HIDA negative.  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         1. Afib rvr  not controlled, HR 100s at rest  Increase Lopressor as needed   c/w  Cardizem, Xarelto   cardiology evaluation underway     Hypotension: will bolus 500cc  ? hypovelemia/dehydration     2. Pneumonia:  will check CT Chest : noted  Ceftriaxone IV day#2    #CAD s/p recent PCI   Trops leveled  c/w Plavix/Statins  on Xarelto for Afib    #EBV acute infection   off Abx per ID  EBvirus IgM +  outpatient hematology to r/o lymphoproliferative disorders as EBV is known to be associated with     #mod pulm HTN     #Thrombocytosis  on Hydrea     #Anxiety: Xanax      Mobility: PT consulted    GOC: full code

## 2023-09-12 NOTE — PROGRESS NOTE ADULT - SUBJECTIVE AND OBJECTIVE BOX
REASON FOR VISIT: AF    HPI:  83 y/o woman with a history of CAD, PCI (LAD, 8/2023), PAF, polycythemia admitted on 9/2/23 with fever and recurrence of AF with RVR.    9/3/23 Events noted , patient developed shortness of breath with hypoxia  with high febrile episode ,  was having nausea ,  some back pain , temp 104  symptoms improved over some time , no recurrence of SOB while in CCU   CT chest did not show CHF or pneumonia    9/4/23 patient had mild degree of sob with febrile episode last night , no chest pain or shortness of breath now   9/5/23 Patient is feeling nauseous , mild abdominal discomfort , in afib with rapid rate since this morning ,   no sob   9/6/23 Feels well complains of palpitations  9/7/23:  Fatigue, resolving cough; no pain / dyspnea  9/8/23:  No new complaints; slept well; no palpitations or dyspnea; remains afebrile.  9/9/23 Pt denies cp, breathing improved.  Feels fatigued and weak.  Tele: afib 90's-110 with bursts up to 140  9/10/23:  Fatigue but no palpitations.  9/11/23: Awake alert feels weak but no CP/SOB, tele AF 80-90   9/12/23: Pt with no new complaints.  Tele: afib 90's-110 with short burst up to 130    Home Medications:  atorvastatin 20 mg oral tablet: 1 tab(s) orally once a day (03 Sep 2023 13:03)  dorzolamide-timolol 2.23%-0.68% (2%-0.5% base) ophthalmic solution: 1 drop(s) in each eye 2 times a day (03 Sep 2023 13:03)  hydroxyurea 500 mg oral capsule: 500 milligram(s) orally once a day take on M-W-F (02 Sep 2023 00:12)  loratadine 10 mg oral tablet: 1 tab(s) orally once a day (at bedtime) (02 Sep 2023 00:12)  metoprolol tartrate 25 mg oral tablet: 1 tab(s) orally 2 times a day (03 Sep 2023 13:03)  Xarelto 20 mg oral tablet: 1 tab(s) orally once a day (at bedtime) (02 Sep 2023 00:12)      MEDICATIONS  (STANDING):  acetaminophen   IVPB .. 1000 milliGRAM(s) IV Intermittent once  atorvastatin 20 milliGRAM(s) Oral at bedtime  cefTRIAXone Injectable. 1000 milliGRAM(s) IV Push every 24 hours  chlorhexidine 4% Liquid 1 Application(s) Topical <User Schedule>  clopidogrel Tablet 75 milliGRAM(s) Oral daily  diltiazem    Tablet 30 milliGRAM(s) Oral every 6 hours  dorzolamide 2%/timolol 0.5% Ophthalmic Solution 1 Drop(s) Both EYES two times a day  furosemide    Tablet 20 milliGRAM(s) Oral daily  hydroxyurea 500 milliGRAM(s) Oral <User Schedule>  loratadine 10 milliGRAM(s) Oral daily  metoprolol tartrate 50 milliGRAM(s) Oral two times a day  rivaroxaban 15 milliGRAM(s) Oral with dinner    MEDICATIONS  (PRN):  acetaminophen     Tablet .. 650 milliGRAM(s) Oral every 6 hours PRN Temp greater or equal to 38C (100.4F), Mild Pain (1 - 3)  aluminum hydroxide/magnesium hydroxide/simethicone Suspension 30 milliLiter(s) Oral every 4 hours PRN Dyspepsia  guaiFENesin Oral Liquid (Sugar-Free) 100 milliGRAM(s) Oral every 6 hours PRN Cough  melatonin 5 milliGRAM(s) Oral at bedtime PRN Insomnia  ondansetron Injectable 4 milliGRAM(s) IV Push every 8 hours PRN Nausea and/or Vomiting    Vital Signs Last 24 Hrs  T(C): 36.5 (12 Sep 2023 09:11), Max: 36.5 (12 Sep 2023 09:11)  T(F): 97.7 (12 Sep 2023 09:11), Max: 97.7 (12 Sep 2023 09:11)  HR: 67 (12 Sep 2023 09:11) (67 - 114)  BP: 95/56 (12 Sep 2023 06:00) (95/56 - 110/77)  BP(mean): --  RR: 19 (12 Sep 2023 09:11) (18 - 19)  SpO2: 99% (12 Sep 2023 09:11) (95% - 99%)    Parameters below as of 12 Sep 2023 09:11  Patient On (Oxygen Delivery Method): nasal cannula  O2 Flow (L/min): 2      PHYSICAL EXAM:  Constitutional: NAD, awake and alert  Respiratory: Breath sounds are clear bilaterally  Cardiovascular: S1 and S2, irregular, normal rate  Gastrointestinal: Bowel Sounds present, soft, nontender.   Extremities: No edema.     LABS:                                  TroponinI hsT: 105.32    12 Lead ECG (09.05.23 @ 07:42):   Atrial fibrillation with rapid ventricular response  Low voltage QRS  Nonspecific ST abnormality  Abnormal ECG  When compared with ECG of 02-SEP-2023 14:19,  Atrial fibrillation has replaced Sinus rhythm  ST now depressed in Inferior leads  ST now depressed in Anterolateral leads  Nonspecific T wave abnormality now evident in Inferior leads    TTE Echo Complete w/o Contrast w/ Doppler (09.02.23 @ 08:38) >   Mild to Moderate mitral regurgitation.   EA reversal of the mitral inflow consistent with reduced compliance of the left ventricle.   The aortic valve is well visualized, appears mildly calcified. Valve opening seems to be normal.   Normal appearing tricuspid valve structure. Moderate (2+) tricuspid valve regurgitation.   Moderate pulmonary hypertension.   Normal appearing pulmonic valve structure. Mild pulmonic valvular regurgitation.   The left atrium is mildly dilated.   Estimated left ventricular ejection fraction is 60-65 %.   The left ventricle is normal in size, wall motion and contractility as seen in limited views.   Mild concentric left ventricular hypertrophy.   The right atrium appears borderline mildly dilated.   Normal appearing right ventricle structure and function.   IVC is dilated and not collapsing with inspiration.    Tele: AF   REASON FOR VISIT: AF    HPI:  83 y/o woman with a history of CAD, PCI (LAD, 8/2023), PAF, polycythemia admitted on 9/2/23 with fever and recurrence of AF with RVR.    9/3/23 Events noted , patient developed shortness of breath with hypoxia  with high febrile episode ,  was having nausea ,  some back pain , temp 104  symptoms improved over some time , no recurrence of SOB while in CCU   CT chest did not show CHF or pneumonia    9/4/23 patient had mild degree of sob with febrile episode last night , no chest pain or shortness of breath now   9/5/23 Patient is feeling nauseous , mild abdominal discomfort , in afib with rapid rate since this morning ,   no sob   9/6/23 Feels well complains of palpitations  9/7/23:  Fatigue, resolving cough; no pain / dyspnea  9/8/23:  No new complaints; slept well; no palpitations or dyspnea; remains afebrile.  9/9/23 Pt denies cp, breathing improved.  Feels fatigued and weak.  Tele: afib 90's-110 with bursts up to 140  9/10/23:  Fatigue but no palpitations.  9/11/23: Awake alert feels weak but no CP/SOB, tele AF 80-90   9/12/23: Pt with no new complaints.  Tele: afib 90's-110 with short burst up to 130    Home Medications:  atorvastatin 20 mg oral tablet: 1 tab(s) orally once a day (03 Sep 2023 13:03)  dorzolamide-timolol 2.23%-0.68% (2%-0.5% base) ophthalmic solution: 1 drop(s) in each eye 2 times a day (03 Sep 2023 13:03)  hydroxyurea 500 mg oral capsule: 500 milligram(s) orally once a day take on M-W-F (02 Sep 2023 00:12)  loratadine 10 mg oral tablet: 1 tab(s) orally once a day (at bedtime) (02 Sep 2023 00:12)  metoprolol tartrate 25 mg oral tablet: 1 tab(s) orally 2 times a day (03 Sep 2023 13:03)  Xarelto 20 mg oral tablet: 1 tab(s) orally once a day (at bedtime) (02 Sep 2023 00:12)      MEDICATIONS  (STANDING):  acetaminophen   IVPB .. 1000 milliGRAM(s) IV Intermittent once  atorvastatin 20 milliGRAM(s) Oral at bedtime  cefTRIAXone Injectable. 1000 milliGRAM(s) IV Push every 24 hours  chlorhexidine 4% Liquid 1 Application(s) Topical <User Schedule>  clopidogrel Tablet 75 milliGRAM(s) Oral daily  diltiazem    Tablet 30 milliGRAM(s) Oral every 6 hours  dorzolamide 2%/timolol 0.5% Ophthalmic Solution 1 Drop(s) Both EYES two times a day  furosemide    Tablet 20 milliGRAM(s) Oral daily  hydroxyurea 500 milliGRAM(s) Oral <User Schedule>  loratadine 10 milliGRAM(s) Oral daily  metoprolol tartrate 50 milliGRAM(s) Oral two times a day  rivaroxaban 15 milliGRAM(s) Oral with dinner    MEDICATIONS  (PRN):  acetaminophen     Tablet .. 650 milliGRAM(s) Oral every 6 hours PRN Temp greater or equal to 38C (100.4F), Mild Pain (1 - 3)  aluminum hydroxide/magnesium hydroxide/simethicone Suspension 30 milliLiter(s) Oral every 4 hours PRN Dyspepsia  guaiFENesin Oral Liquid (Sugar-Free) 100 milliGRAM(s) Oral every 6 hours PRN Cough  melatonin 5 milliGRAM(s) Oral at bedtime PRN Insomnia  ondansetron Injectable 4 milliGRAM(s) IV Push every 8 hours PRN Nausea and/or Vomiting    Vital Signs Last 24 Hrs  T(C): 36.5 (12 Sep 2023 09:11), Max: 36.5 (12 Sep 2023 09:11)  T(F): 97.7 (12 Sep 2023 09:11), Max: 97.7 (12 Sep 2023 09:11)  HR: 67 (12 Sep 2023 09:11) (67 - 114)  BP: 95/56 (12 Sep 2023 06:00) (95/56 - 110/77)  BP(mean): --  RR: 19 (12 Sep 2023 09:11) (18 - 19)  SpO2: 99% (12 Sep 2023 09:11) (95% - 99%)    Parameters below as of 12 Sep 2023 09:11  Patient On (Oxygen Delivery Method): nasal cannula  O2 Flow (L/min): 2      PHYSICAL EXAM:  Constitutional: NAD, awake and alert  Respiratory: Breath sounds are clear bilaterally  Cardiovascular: S1 and S2, irregular, normal rate  Gastrointestinal: Bowel Sounds present, soft, nontender.   Extremities: No edema.     LABS:                                  TroponinI hsT: 105.32    12 Lead ECG (09.05.23 @ 07:42):   Atrial fibrillation with rapid ventricular response  Low voltage QRS  Nonspecific ST abnormality  Abnormal ECG  When compared with ECG of 02-SEP-2023 14:19,  Atrial fibrillation has replaced Sinus rhythm  ST now depressed in Inferior leads  ST now depressed in Anterolateral leads  Nonspecific T wave abnormality now evident in Inferior leads    TTE Echo Complete w/o Contrast w/ Doppler (09.02.23 @ 08:38) >   Mild to Moderate mitral regurgitation.   EA reversal of the mitral inflow consistent with reduced compliance of the left ventricle.   The aortic valve is well visualized, appears mildly calcified. Valve opening seems to be normal.   Normal appearing tricuspid valve structure. Moderate (2+) tricuspid valve regurgitation.   Moderate pulmonary hypertension.   Normal appearing pulmonic valve structure. Mild pulmonic valvular regurgitation.   The left atrium is mildly dilated.   Estimated left ventricular ejection fraction is 60-65 %.   The left ventricle is normal in size, wall motion and contractility as seen in limited views.   Mild concentric left ventricular hypertrophy.   The right atrium appears borderline mildly dilated.   Normal appearing right ventricle structure and function.   IVC is dilated and not collapsing with inspiration.    Tele: AF with

## 2023-09-12 NOTE — PROGRESS NOTE ADULT - PROBLEM SELECTOR PLAN 1
HR better controlled but still has short bursts up to 130  Will await for full resolution of infection reassess HR/AF consider EP as OPT   Need to assess HR with activity   continue Xarelto.  OPT telemetry

## 2023-09-13 LAB
ANION GAP SERPL CALC-SCNC: 4 MMOL/L — LOW (ref 5–17)
BUN SERPL-MCNC: 20 MG/DL — SIGNIFICANT CHANGE UP (ref 7–23)
CALCIUM SERPL-MCNC: 8.7 MG/DL — SIGNIFICANT CHANGE UP (ref 8.5–10.1)
CHLORIDE SERPL-SCNC: 112 MMOL/L — HIGH (ref 96–108)
CO2 SERPL-SCNC: 25 MMOL/L — SIGNIFICANT CHANGE UP (ref 22–31)
CREAT SERPL-MCNC: 0.73 MG/DL — SIGNIFICANT CHANGE UP (ref 0.5–1.3)
EGFR: 82 ML/MIN/1.73M2 — SIGNIFICANT CHANGE UP
GLUCOSE SERPL-MCNC: 111 MG/DL — HIGH (ref 70–99)
HCT VFR BLD CALC: 34.9 % — SIGNIFICANT CHANGE UP (ref 34.5–45)
HGB BLD-MCNC: 11.6 G/DL — SIGNIFICANT CHANGE UP (ref 11.5–15.5)
MCHC RBC-ENTMCNC: 30.1 PG — SIGNIFICANT CHANGE UP (ref 27–34)
MCHC RBC-ENTMCNC: 33.2 GM/DL — SIGNIFICANT CHANGE UP (ref 32–36)
MCV RBC AUTO: 90.6 FL — SIGNIFICANT CHANGE UP (ref 80–100)
PLATELET # BLD AUTO: 405 K/UL — HIGH (ref 150–400)
POTASSIUM SERPL-MCNC: 4 MMOL/L — SIGNIFICANT CHANGE UP (ref 3.5–5.3)
POTASSIUM SERPL-SCNC: 4 MMOL/L — SIGNIFICANT CHANGE UP (ref 3.5–5.3)
RBC # BLD: 3.85 M/UL — SIGNIFICANT CHANGE UP (ref 3.8–5.2)
RBC # FLD: 15.3 % — HIGH (ref 10.3–14.5)
SODIUM SERPL-SCNC: 141 MMOL/L — SIGNIFICANT CHANGE UP (ref 135–145)
WBC # BLD: 5.6 K/UL — SIGNIFICANT CHANGE UP (ref 3.8–10.5)
WBC # FLD AUTO: 5.6 K/UL — SIGNIFICANT CHANGE UP (ref 3.8–10.5)

## 2023-09-13 PROCEDURE — 99232 SBSQ HOSP IP/OBS MODERATE 35: CPT

## 2023-09-13 PROCEDURE — 99233 SBSQ HOSP IP/OBS HIGH 50: CPT

## 2023-09-13 RX ADMIN — Medication 100 MILLIGRAM(S): at 10:03

## 2023-09-13 RX ADMIN — Medication 5 MILLIGRAM(S): at 22:12

## 2023-09-13 RX ADMIN — CEFTRIAXONE 1000 MILLIGRAM(S): 500 INJECTION, POWDER, FOR SOLUTION INTRAMUSCULAR; INTRAVENOUS at 11:51

## 2023-09-13 RX ADMIN — LORATADINE 10 MILLIGRAM(S): 10 TABLET ORAL at 10:00

## 2023-09-13 RX ADMIN — HYDROXYUREA 500 MILLIGRAM(S): 500 CAPSULE ORAL at 09:59

## 2023-09-13 RX ADMIN — Medication 20 MILLIGRAM(S): at 10:01

## 2023-09-13 RX ADMIN — CHLORHEXIDINE GLUCONATE 1 APPLICATION(S): 213 SOLUTION TOPICAL at 06:25

## 2023-09-13 RX ADMIN — ATORVASTATIN CALCIUM 20 MILLIGRAM(S): 80 TABLET, FILM COATED ORAL at 22:12

## 2023-09-13 RX ADMIN — RIVAROXABAN 15 MILLIGRAM(S): KIT at 18:06

## 2023-09-13 RX ADMIN — Medication 50 MILLIGRAM(S): at 10:00

## 2023-09-13 RX ADMIN — Medication 50 MILLIGRAM(S): at 22:13

## 2023-09-13 RX ADMIN — DORZOLAMIDE HYDROCHLORIDE TIMOLOL MALEATE 1 DROP(S): 20; 5 SOLUTION/ DROPS OPHTHALMIC at 10:02

## 2023-09-13 RX ADMIN — DORZOLAMIDE HYDROCHLORIDE TIMOLOL MALEATE 1 DROP(S): 20; 5 SOLUTION/ DROPS OPHTHALMIC at 22:12

## 2023-09-13 RX ADMIN — CLOPIDOGREL BISULFATE 75 MILLIGRAM(S): 75 TABLET, FILM COATED ORAL at 09:59

## 2023-09-13 RX ADMIN — Medication 125 MICROGRAM(S): at 09:59

## 2023-09-13 NOTE — CONSULT NOTE ADULT - ASSESSMENT
82 yr old female with above PMHx admitted with AF with RVRs, fevers and rigors, found to have acute infectious mononucleosis and possible PNA. Her atrial fib was diagnosed recently as outpatient, she is on Xarelto. LVEF 60-65%.  Still having some RVRs and yesterday she became hypotensive with SBPs 70-80s.  IV fluid bolus was given, Cardizem was discontinued, IV Digoxin given, now on PO Dig.  She is on Lopressor 50mg BID.  Pt has RVRs with minimal activity.    A/P: AF with RVR in setting of infectious process and viral syndrome (+EBV)  Continue tele monitoring, rates are better today 's  Continue Lopressor BID  Continue Dig PO  LVEF is 60-65%   She is still on IV antibiotics, afebrile, VSS today  Once infection is cleared, recommend [facility] for discharge to further monitor HR.  Encourage PO intake.  Plan d/w patient, RN, cardiology NP, Dr. Mendoza

## 2023-09-13 NOTE — CONSULT NOTE ADULT - REASON FOR ADMISSION
Palpitation, Elevated troponin, A fib

## 2023-09-13 NOTE — PROGRESS NOTE ADULT - SUBJECTIVE AND OBJECTIVE BOX
81 y/o female with recent hospitalization for CAD, underwent LAD PCI, paroxysmal A.fib, thrombocythemia on hydroxyurea   Presented with palpitations and PASCAL at home   Also with fever, diarrhea   She was initially admitted to the floor, had an episode of A.fib with RVR, high fever of 104.5.  -found to have EBV acute infection; hosp course sign for uncontrolled Afib and Pna    9/4 Case discussed on CCU rounds, afebrile for HIDA in am.  9/5 Case discussed on CCU rounds, HIDA- negative, still with low grade temps., and AF RVR.  9/6 - patient had tmax 102F, and patient had worsening afib rvr, cardizem dripped went up to 10, s/p IV tylenol with improvement of her rigors.  s/p blood culture   9/7 - no fever overnight, diltiazem drip off overnight, net negative 1 L.    9.8: no cp, no sob, c/o coughing   9.9: +anxiety, Tele: AFib Rvr 120/min, no cp, no sob  9.10: better rate control, Afib 80-90s  +coughing   9.11: c/o coughing, +diaphoresis  Tele: Afib 80s  9.12: BP low today, Afib: 100s with bursts to 130/min at rest   +anxiety   9.13: BP stable today, Tele: Afib 100s at rest with bursts up to 140  coughing improving         REVIEW OF SYSTEMS:    CONSTITUTIONAL: No weakness, No fevers or chills  ENT: No ear ache, No sorethroat  NECK: No pain, No stiffness  RESPIRATORY: +cough, No wheezing, No hemoptysis; No dyspnea  CARDIOVASCULAR: No chest pain, No palpitations  GASTROINTESTINAL: No abd pain, No nausea, No vomiting, No hematemesis, No diarrhea or constipation. No melena, No hematochezia.  GENITOURINARY: No dysuria, No  hematuria  NEUROLOGICAL: No diplopia, No paresthesia, No motor dysfunction  MUSCULOSKELETAL: No arthralgia, No myalgia  SKIN: No rashes, or lesions   PSYCH: ++ anxiety, no suicidal ideation    All other review of systems is negative unless indicated above    Vital Signs Last 24 Hrs  T(C): 36.4 (13 Sep 2023 07:52), Max: 36.4 (13 Sep 2023 07:52)  T(F): 97.6 (13 Sep 2023 07:52), Max: 97.6 (13 Sep 2023 07:52)  HR: 104 (13 Sep 2023 07:52) (99 - 110)  BP: 128/87 (13 Sep 2023 07:52) (81/45 - 128/87)  RR: 18 (13 Sep 2023 07:52) (16 - 18)  SpO2: 100% (13 Sep 2023 07:52) (96% - 100%)    Parameters below as of 13 Sep 2023 07:52  Patient On (Oxygen Delivery Method): room air        PHYSICAL EXAM:    GENERAL: NAD  HEENT:  NC/AT, EOMI, PERRLA, No scleral icterus, Moist mucous membranes  NECK: Supple, No JVD  CNS:  Alert & Oriented X3, Motor Strength 5/5 B/L upper and lower extremities; DTRs 2+ intact   LUNG: Normal Breath sounds, Clear to auscultation bilaterally, +basilar rales, No rhonchi, No wheezing  HEART: irreg,  No murmurs, No rubs  ABDOMEN: +BS, ST/ND/NT  GENITOURINARY: Voiding, Bladder not distended  EXTREMITIES:  2+ Peripheral Pulses, No clubbing, No cyanosis, No tibial edema  MUSCULOSKELTAL: Joints normal ROM, No TTP, No effusion  VAGINAL: deferred  SKIN: no rashes  RECTAL: deferred, not indicated  BREAST: deferred                          11.6   5.60  )-----------( 405      ( 13 Sep 2023 06:16 )             34.9     09-13    141  |  112<H>  |  20  ----------------------------<  111<H>  4.0   |  25  |  0.73    Ca    8.7      13 Sep 2023 06:16           MEDICATIONS  (STANDING):  acetaminophen   IVPB .. 1000 milliGRAM(s) IV Intermittent once  atorvastatin 20 milliGRAM(s) Oral at bedtime  cefTRIAXone Injectable. 1000 milliGRAM(s) IV Push every 24 hours  chlorhexidine 4% Liquid 1 Application(s) Topical <User Schedule>  clopidogrel Tablet 75 milliGRAM(s) Oral daily  digoxin     Tablet 125 MICROGram(s) Oral daily  dorzolamide 2%/timolol 0.5% Ophthalmic Solution 1 Drop(s) Both EYES two times a day  furosemide    Tablet 20 milliGRAM(s) Oral daily  hydroxyurea 500 milliGRAM(s) Oral <User Schedule>  loratadine 10 milliGRAM(s) Oral daily  metoprolol tartrate 50 milliGRAM(s) Oral two times a day  rivaroxaban 15 milliGRAM(s) Oral with dinner  sodium chloride 0.9%. 500 milliLiter(s) (100 mL/Hr) IV Continuous <Continuous>          81 y/o F with hx of CAD , recent LAD PCI about 1 wek ago, afib, , thrombocythemia on hydroxyurea, present with palpitation , found to be in afib rvr, with fever.  Started diltiazem drip and antibiotic.  initial infectious workup unremarkable, CT showed gallbladder distention, HIDA negative.  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         1. Afib RVR  not controlled, HR 100s at rest  Lopressor increased, SBP soft  c/w  Cardizem, Xarelto   cardiology evaluation underway   EP evaluation     Hypotension: resolved after bolusing 500cc  ? hypovelemia/dehydration     2. Pneumonia:  will check CT Chest : noted  Ceftriaxone IV day#3    #CAD s/p recent PCI   Trops leveled , demand ischemia   c/w Plavix/Statins  on Xarelto for Afib    #EBV acute infection   EBV IgM +  outpatient hematology as EBV is known to be associated with ymphoproliferative disorders    #Mod pulm HTN     #Thrombocytosis  on Hydrea   outpatient Hem f/u    #Anxiety: Xanax prn      Mobility: PT consulted    GOC: full code                81 y/o female with recent hospitalization for CAD, underwent LAD PCI, paroxysmal A.fib, thrombocythemia on hydroxyurea   Presented with palpitations and PASCAL at home   Also with fever, diarrhea   She was initially admitted to the floor, had an episode of A.fib with RVR, high fever of 104.5.  -found to have EBV acute infection; hosp course sign for uncontrolled Afib and Pna    9/4 Case discussed on CCU rounds, afebrile for HIDA in am.  9/5 Case discussed on CCU rounds, HIDA- negative, still with low grade temps., and AF RVR.  9/6 - patient had tmax 102F, and patient had worsening afib rvr, cardizem dripped went up to 10, s/p IV tylenol with improvement of her rigors.  s/p blood culture   9/7 - no fever overnight, diltiazem drip off overnight, net negative 1 L.    9.8: no cp, no sob, c/o coughing   9.9: +anxiety, Tele: AFib Rvr 120/min, no cp, no sob  9.10: better rate control, Afib 80-90s  +coughing   9.11: c/o coughing, +diaphoresis  Tele: Afib 80s  9.12: BP low today, Afib: 100s with bursts to 130/min at rest   +anxiety   9.13: BP stable today, Tele: Afib 100s at rest with bursts up to 140  coughing improving         REVIEW OF SYSTEMS:    CONSTITUTIONAL: No weakness, No fevers or chills  ENT: No ear ache, No sorethroat  NECK: No pain, No stiffness  RESPIRATORY: +cough, No wheezing, No hemoptysis; No dyspnea  CARDIOVASCULAR: No chest pain, No palpitations  GASTROINTESTINAL: No abd pain, No nausea, No vomiting, No hematemesis, No diarrhea or constipation. No melena, No hematochezia.  GENITOURINARY: No dysuria, No  hematuria  NEUROLOGICAL: No diplopia, No paresthesia, No motor dysfunction  MUSCULOSKELETAL: No arthralgia, No myalgia  SKIN: No rashes, or lesions   PSYCH: ++ anxiety, no suicidal ideation    All other review of systems is negative unless indicated above    Vital Signs Last 24 Hrs  T(C): 36.4 (13 Sep 2023 07:52), Max: 36.4 (13 Sep 2023 07:52)  T(F): 97.6 (13 Sep 2023 07:52), Max: 97.6 (13 Sep 2023 07:52)  HR: 104 (13 Sep 2023 07:52) (99 - 110)  BP: 128/87 (13 Sep 2023 07:52) (81/45 - 128/87)  RR: 18 (13 Sep 2023 07:52) (16 - 18)  SpO2: 100% (13 Sep 2023 07:52) (96% - 100%)    Parameters below as of 13 Sep 2023 07:52  Patient On (Oxygen Delivery Method): room air        PHYSICAL EXAM:    GENERAL: NAD  HEENT:  NC/AT, EOMI, PERRLA, No scleral icterus, Moist mucous membranes  NECK: Supple, No JVD  CNS:  Alert & Oriented X3, Motor Strength 5/5 B/L upper and lower extremities; DTRs 2+ intact   LUNG: Normal Breath sounds, Clear to auscultation bilaterally, +basilar rales, No rhonchi, No wheezing  HEART: irreg,  No murmurs, No rubs  ABDOMEN: +BS, ST/ND/NT  GENITOURINARY: Voiding, Bladder not distended  EXTREMITIES:  2+ Peripheral Pulses, No clubbing, No cyanosis, No tibial edema  MUSCULOSKELTAL: Joints normal ROM, No TTP, No effusion  VAGINAL: deferred  SKIN: no rashes  RECTAL: deferred, not indicated  BREAST: deferred                          11.6   5.60  )-----------( 405      ( 13 Sep 2023 06:16 )             34.9     09-13    141  |  112<H>  |  20  ----------------------------<  111<H>  4.0   |  25  |  0.73    Ca    8.7      13 Sep 2023 06:16           MEDICATIONS  (STANDING):  acetaminophen   IVPB .. 1000 milliGRAM(s) IV Intermittent once  atorvastatin 20 milliGRAM(s) Oral at bedtime  cefTRIAXone Injectable. 1000 milliGRAM(s) IV Push every 24 hours  chlorhexidine 4% Liquid 1 Application(s) Topical <User Schedule>  clopidogrel Tablet 75 milliGRAM(s) Oral daily  digoxin     Tablet 125 MICROGram(s) Oral daily  dorzolamide 2%/timolol 0.5% Ophthalmic Solution 1 Drop(s) Both EYES two times a day  furosemide    Tablet 20 milliGRAM(s) Oral daily  hydroxyurea 500 milliGRAM(s) Oral <User Schedule>  loratadine 10 milliGRAM(s) Oral daily  metoprolol tartrate 50 milliGRAM(s) Oral two times a day  rivaroxaban 15 milliGRAM(s) Oral with dinner  sodium chloride 0.9%. 500 milliLiter(s) (100 mL/Hr) IV Continuous <Continuous>          81 y/o F with hx of CAD , recent LAD PCI about 1 wek ago, afib, , thrombocythemia on hydroxyurea, present with palpitation , found to be in afib rvr, with fever.  Started diltiazem drip and antibiotic.  initial infectious workup unremarkable, CT showed gallbladder distention, HIDA negative.  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         1. Afib RVR  not controlled, HR 100s at rest  Lopressor increased, SBP soft  c/w Digoxin , Xarelto   cardiology evaluation underway   EP evaluation     Hypotension: resolved after bolusing 500cc  ? hypovelemia/dehydration     2. Pneumonia:  will check CT Chest : noted  Ceftriaxone IV day#3    On Low dose Lasix for small effusion     #CAD s/p recent PCI   Trops leveled , demand ischemia   c/w Plavix/Statins  on Xarelto for Afib    #EBV acute infection   EBV IgM +  outpatient hematology as EBV is known to be associated with ymphoproliferative disorders    #Mod pulm HTN     #Thrombocytosis  on Hydrea   outpatient Hem f/u    #Anxiety: Xanax prn      Mobility: PT consulted    GOC: full code

## 2023-09-13 NOTE — PROGRESS NOTE ADULT - SUBJECTIVE AND OBJECTIVE BOX
REASON FOR VISIT: AF    HPI:  83 y/o woman with a history of CAD, PCI (LAD, 8/2023), PAF, polycythemia admitted on 9/2/23 with fever and recurrence of AF with RVR.    9/3/23 Events noted , patient developed shortness of breath with hypoxia  with high febrile episode ,  was having nausea ,  some back pain , temp 104  symptoms improved over some time , no recurrence of SOB while in CCU   CT chest did not show CHF or pneumonia    9/4/23 patient had mild degree of sob with febrile episode last night , no chest pain or shortness of breath now   9/5/23 Patient is feeling nauseous , mild abdominal discomfort , in afib with rapid rate since this morning ,   no sob   9/6/23 Feels well complains of palpitations  9/7/23:  Fatigue, resolving cough; no pain / dyspnea  9/8/23:  No new complaints; slept well; no palpitations or dyspnea; remains afebrile.  9/9/23 Pt denies cp, breathing improved.  Feels fatigued and weak.  Tele: afib 90's-110 with bursts up to 140  9/10/23:  Fatigue but no palpitations.  9/11/23: Awake alert feels weak but no CP/SOB, tele AF 80-90   9/12/23: Pt with no new complaints.  Tele: Afib 90's-110 with short burst up to 130  9/13/23: Seen in bed, feels better, Tele: Afib   9/14/23: no complaints, Tele: Afib/flutter 90s with bursts up to 110 when ambulating     Home Medications:  atorvastatin 20 mg oral tablet: 1 tab(s) orally once a day (03 Sep 2023 13:03)  dorzolamide-timolol 2.23%-0.68% (2%-0.5% base) ophthalmic solution: 1 drop(s) in each eye 2 times a day (03 Sep 2023 13:03)  hydroxyurea 500 mg oral capsule: 500 milligram(s) orally once a day take on M-W-F (02 Sep 2023 00:12)  loratadine 10 mg oral tablet: 1 tab(s) orally once a day (at bedtime) (02 Sep 2023 00:12)  metoprolol tartrate 25 mg oral tablet: 1 tab(s) orally 2 times a day (03 Sep 2023 13:03)  Xarelto 20 mg oral tablet: 1 tab(s) orally once a day (at bedtime) (02 Sep 2023 00:12)    MEDICATIONS  (STANDING):  acetaminophen   IVPB .. 1000 milliGRAM(s) IV Intermittent once  atorvastatin 20 milliGRAM(s) Oral at bedtime  cefTRIAXone Injectable. 1000 milliGRAM(s) IV Push every 24 hours  chlorhexidine 4% Liquid 1 Application(s) Topical <User Schedule>  clopidogrel Tablet 75 milliGRAM(s) Oral daily  dextrose 50% Injectable 50 milliLiter(s) IV Push once  dextrose 50% Injectable 12.5 Gram(s) IV Push once  dextrose 50% Injectable 25 Gram(s) IV Push once  dextrose 50% Injectable 25 Gram(s) IV Push once  digoxin     Tablet 125 MICROGram(s) Oral daily  dorzolamide 2%/timolol 0.5% Ophthalmic Solution 1 Drop(s) Both EYES two times a day  furosemide    Tablet 20 milliGRAM(s) Oral daily  glucagon  Injectable 1 milliGRAM(s) IntraMuscular once  hydroxyurea 500 milliGRAM(s) Oral <User Schedule>  loratadine 10 milliGRAM(s) Oral daily  metoprolol tartrate 50 milliGRAM(s) Oral two times a day  rivaroxaban 15 milliGRAM(s) Oral with dinner    MEDICATIONS  (PRN):  acetaminophen     Tablet .. 650 milliGRAM(s) Oral every 6 hours PRN Temp greater or equal to 38C (100.4F), Mild Pain (1 - 3)  aluminum hydroxide/magnesium hydroxide/simethicone Suspension 30 milliLiter(s) Oral every 4 hours PRN Dyspepsia  guaiFENesin Oral Liquid (Sugar-Free) 100 milliGRAM(s) Oral every 6 hours PRN Cough  melatonin 5 milliGRAM(s) Oral at bedtime PRN Insomnia  ondansetron Injectable 4 milliGRAM(s) IV Push every 8 hours PRN Nausea and/or Vomiting    Vital Signs Last 24 Hrs  T(C): 36.5 (14 Sep 2023 07:27), Max: 36.6 (13 Sep 2023 20:20)  T(F): 97.7 (14 Sep 2023 07:27), Max: 97.8 (13 Sep 2023 20:20)  HR: 108 (14 Sep 2023 07:27) (97 - 108)  BP: 94/59 (14 Sep 2023 07:27) (94/59 - 110/64)  BP(mean): --  RR: 19 (14 Sep 2023 07:27) (16 - 19)  SpO2: 98% (14 Sep 2023 07:27) (97% - 98%)    Parameters below as of 14 Sep 2023 07:27  Patient On (Oxygen Delivery Method): room air      PHYSICAL EXAM:  Constitutional: NAD, awake and alert  Respiratory: Breath sounds are clear bilaterally  Cardiovascular: S1 and S2, irregular, normal rate  Gastrointestinal: Bowel Sounds present, soft, nontender.   Extremities: No edema.     LABS:  reviewed                               11.6   5.60  )-----------( 405      ( 13 Sep 2023 06:16 )             34.9     09-13    141  |  112<H>  |  20  ----------------------------<  111<H>  4.0   |  25  |  0.73    Ca    8.7      13 Sep 2023 06:16      - TroponinI hsT: <-105.32                       TroponinI hsT: 105.32    12 Lead ECG (09.05.23 @ 07:42):   Atrial fibrillation with rapid ventricular response  Low voltage QRS  Nonspecific ST abnormality  Abnormal ECG  When compared with ECG of 02-SEP-2023 14:19,  Atrial fibrillation has replaced Sinus rhythm  ST now depressed in Inferior leads  ST now depressed in Anterolateral leads  Nonspecific T wave abnormality now evident in Inferior leads    TTE Echo Complete w/o Contrast w/ Doppler (09.02.23 @ 08:38) >   Mild to Moderate mitral regurgitation.   EA reversal of the mitral inflow consistent with reduced compliance of the left ventricle.   The aortic valve is well visualized, appears mildly calcified. Valve opening seems to be normal.   Normal appearing tricuspid valve structure. Moderate (2+) tricuspid valve regurgitation.   Moderate pulmonary hypertension.   Normal appearing pulmonic valve structure. Mild pulmonic valvular regurgitation.   The left atrium is mildly dilated.   Estimated left ventricular ejection fraction is 60-65 %.   The left ventricle is normal in size, wall motion and contractility as seen in limited views.   Mild concentric left ventricular hypertrophy.   The right atrium appears borderline mildly dilated.   Normal appearing right ventricle structure and function.   IVC is dilated and not collapsing with inspiration.    Tele: AF with

## 2023-09-13 NOTE — PROGRESS NOTE ADULT - ASSESSMENT
81 y/o Female with a MH significant for CAD, recent LAD PCI, A- Fib on Xarelto and thrombocythemia on Hydroxyurea, p/w palpitation, found to be in A-Fib, s/p diltiazem drip and antibiotic. Initial infectious workup unremarkable, CT showed gallbladder distention, HIDA negative.     # Thrombocythemia-R/O Lymphoproliferative Ds    -Referred by PCP to Hematologist-Dr. Rodriguez for thrombocytosis in June, 2023 at the same had underwent biopsy for scalp lesion, s/p excision of pilar cyst-->w/u sent to r/o thrombocytosis due to reactivity secondary to scalp infection versus developing primary myeloproliferative disorder.   -with persistent thrombocytosis patient was started on Hydrea 500 mg TIW on 8/4/2023-->platelets normalized in 9/1/23  -Platelets upon presentation in ED-287 K/ul  -plts-405 K/ul as of 9/13/2023  -Monitor CBC  -Patient reported diarrhea since start of Hydrea, possible but less likely.  -Continue Hydrea 500 mg PO TIW (M-W-F)  -f/u outpatient with Dr. Rodriguez for further w/u    # A- Fib/CAD    -Cardiology following --> on Diltiazem, Metoprolol  -On AC w/ Xarelto.  -s/p single vessel CAD (80% pLAD) s/p NELSON 8/23/23->on Plavix, atorvastatin, BB.  -Troponin elevated and trended down likely s/t infection; On Plavix, metoprolol, and atorvastatin.    # Febrile syndrome/ Acute infectious mononucleosis /PNA    - No radiologic evidence of acute cholecystitis. A.fib.   - fever subsided  - Lyme AB, Babesia PCR are negative  - serology for EBV indicates acute infection  - BC x 2, urine c/s- NGTD  - ID following -->no need for abx therapy   - monitor temps, f/u CBC  - supportive care     83 y/o female with a MH significant for CAD, recent LAD PCI, A- Fib on Xarelto and thrombocythemia on Hydroxyurea, p/w palpitation, found to be in A-Fib, s/p diltiazem drip and antibiotic. Initial infectious workup unremarkable, CT showed gallbladder distention, HIDA negative.     # Thrombocythemia-R/O Lymphoproliferative Ds    -Referred by PCP to Hematologist-Dr. Rodriguez for thrombocytosis in June, 2023 at the same had underwent biopsy for scalp lesion, s/p excision of pilar cyst-->w/u sent to r/o thrombocytosis due to reactivity secondary to scalp infection versus developing primary myeloproliferative disorder.   -with persistent thrombocytosis patient was started on Hydrea 500 mg TIW on 8/4/2023-->platelets normalized in 9/1/23  -Platelets upon presentation in ED-287 K/ul  -plts-405 K/ul as of 9/13/2023  -Monitor CBC  -Patient reported diarrhea since start of Hydrea, possible but less likely.  -Continue Hydrea 500 mg PO TIW (M-W-F)  -f/u outpatient with Dr. Rodriguez for further w/u    # A- Fib/CAD    -Cardiology following --> on Diltiazem, Metoprolol  -On AC w/ Xarelto.  -s/p single vessel CAD (80% pLAD) s/p NELSON 8/23/23->on Plavix, atorvastatin, BB.  -Troponin elevated and trended down likely s/t infection; On Plavix, metoprolol, and atorvastatin.    # Febrile syndrome/ Acute infectious mononucleosis /PNA    - No radiologic evidence of acute cholecystitis. A.fib.   - fever subsided  - Lyme AB, Babesia PCR are negative  - serology for EBV indicates acute infection  - BC x 2, urine c/s- NGTD  - ID following -->no need for abx therapy   - monitor temps, f/u CBC  - supportive care

## 2023-09-13 NOTE — CONSULT NOTE ADULT - SUBJECTIVE AND OBJECTIVE BOX
HPI:  81 y/o Female with a PMHx of left shoulder pain, stress incontinence, eczema, low back pain, arthritis, glaucoma, chronic rhinitis presents to the ED with complain of palpitations and dyspnea on exertion. The patient was diagnosed with afib 3 weeks ago and placed on xarelto. Had stents placed 1 week ago with Dr. Toney and placed on plavix and metroprolol with improvement of exertional chest pressure. Patient started on have sudden onset of palpitations while having dinner last evening. She was feeling out of breath yesterday evening going up the stairs, also felt chest pressure and tremors. On last Wednesday night, had diarrhea, fever of 101 F, chills, and diaphoresis that subsided yesterday. No other complain now. Patient was found to be in A fib with RVR when she arrived in ED yesterday. She converted to NSR spontaneously.  (02 Sep 2023 00:42)      23: EP asked to see pt who has been here since 23, events of hospitalization reviewed, acute infectious mononucleosis with fevers and LLL infiltrate, on IV antibiotics, Afib with RVRs, likely from illness.  On AV patrick agents for rate control, yesterday on  she became hypotensive with HR bursts to 130's.  Started on IV digoxin, cardizem discontinued.  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        SOCIAL HISTORY: Pt is a [employer], denies tobacco, etoh abuse or illicit drug use    FAMILY HISTORY:  Family history of prostate cancer in father (Father)    Family history of glioblastoma (Mother, Sibling)    Family history of coronary artery disease (Sibling)    Family history of diabetes mellitus (DM) (Sibling)          MEDICATIONS  (STANDING):  acetaminophen   IVPB .. 1000 milliGRAM(s) IV Intermittent once  atorvastatin 20 milliGRAM(s) Oral at bedtime  cefTRIAXone Injectable. 1000 milliGRAM(s) IV Push every 24 hours  chlorhexidine 4% Liquid 1 Application(s) Topical <User Schedule>  clopidogrel Tablet 75 milliGRAM(s) Oral daily  digoxin     Tablet 125 MICROGram(s) Oral daily  dorzolamide 2%/timolol 0.5% Ophthalmic Solution 1 Drop(s) Both EYES two times a day  furosemide    Tablet 20 milliGRAM(s) Oral daily  hydroxyurea 500 milliGRAM(s) Oral <User Schedule>  loratadine 10 milliGRAM(s) Oral daily  metoprolol tartrate 50 milliGRAM(s) Oral two times a day  rivaroxaban 15 milliGRAM(s) Oral with dinner  sodium chloride 0.9%. 500 milliLiter(s) (100 mL/Hr) IV Continuous <Continuous>    MEDICATIONS  (PRN):  acetaminophen     Tablet .. 650 milliGRAM(s) Oral every 6 hours PRN Temp greater or equal to 38C (100.4F), Mild Pain (1 - 3)  ALPRAZolam 0.25 milliGRAM(s) Oral three times a day PRN Anxiety  aluminum hydroxide/magnesium hydroxide/simethicone Suspension 30 milliLiter(s) Oral every 4 hours PRN Dyspepsia  guaiFENesin Oral Liquid (Sugar-Free) 100 milliGRAM(s) Oral every 6 hours PRN Cough  melatonin 5 milliGRAM(s) Oral at bedtime PRN Insomnia  ondansetron Injectable 4 milliGRAM(s) IV Push every 8 hours PRN Nausea and/or Vomiting      Allergies    No Known Allergies    Intolerances        Vital Signs Last 24 Hrs  T(C): 36.4 (13 Sep 2023 07:52), Max: 36.4 (13 Sep 2023 07:52)  T(F): 97.6 (13 Sep 2023 07:52), Max: 97.6 (13 Sep 2023 07:52)  HR: 104 (13 Sep 2023 07:52) (99 - 110)  BP: 128/87 (13 Sep 2023 07:52) (81/40 - 128/87)  BP(mean): --  RR: 18 (13 Sep 2023 07:52) (16 - 18)  SpO2: 100% (13 Sep 2023 07:52) (96% - 100%)    Parameters below as of 13 Sep 2023 07:52  Patient On (Oxygen Delivery Method): room air        REVIEW OF SYSTEMS:    CONSTITUTIONAL:  As per HPI.  HEENT:  Eyes:  No diplopia or blurred vision. ENT:  No earache, sore throat or runny nose.  CARDIOVASCULAR:  No pressure, squeezing, strangling, tightness, heaviness or aching about the chest, neck, axilla or epigastrium.  RESPIRATORY:  +occasional cough  GASTROINTESTINAL:  No nausea, vomiting or diarrhea.  GENITOURINARY:  No dysuria, frequency or urgency.  MUSCULOSKELETAL:  As per HPI.  SKIN:  No change in skin, hair or nails.  NEUROLOGIC:  No paresthesias, fasciculations, seizures or weakness.  PSYCHIATRIC:  No disorder of thought or mood.  ENDOCRINE:  No heat or cold intolerance, polyuria or polydipsia.  HEMATOLOGICAL:  No easy bruising or bleedings:  .     PHYSICAL EXAMINATION:    GENERAL APPEARANCE:  Pt. is not currently dyspneic, in no distress. Pt. is alert, oriented, and pleasant.  HEENT:  Pupils are normal and react normally. No icterus. Mucous membranes well colored.  NECK:  Supple. No lymphadenopathy. Jugular venous pressure not elevated. Carotids equal.   HEART:   The cardiac impulse has a normal quality. There are no murmurs, rubs or gallops noted  CHEST:  Chest is clear to auscultation. Normal respiratory effort.  ABDOMEN:  Soft and nontender.   EXTREMITIES:  There is no edema.   SKIN:  No rash or significant lesions are noted.    I&O's Summary    12 Sep 2023 07:01  -  13 Sep 2023 07:00  --------------------------------------------------------  IN: 0 mL / OUT: 800 mL / NET: -800 mL        LABS:                        11.6   5.60  )-----------( 405      ( 13 Sep 2023 06:16 )             34.9         141  |  112<H>  |  20  ----------------------------<  111<H>  4.0   |  25  |  0.73    Ca    8.7      13 Sep 2023 06:16              Urinalysis Basic - ( 13 Sep 2023 06:16 )    Color: x / Appearance: x / SG: x / pH: x  Gluc: 111 mg/dL / Ketone: x  / Bili: x / Urobili: x   Blood: x / Protein: x / Nitrite: x   Leuk Esterase: x / RBC: x / WBC x   Sq Epi: x / Non Sq Epi: x / Bacteria: x        CARDIAC TESTS:      < from: TTE Echo Complete w/o Contrast w/ Doppler (23 @ 08:38) >   Impression     Summary     There is calcification of anterior mitral valve leaflet. The leaflet   opening is normal.   Mild mitral annular calcification is present.   Mild to Moderate mitral regurgitation is present.   EA reversal of the mitral inflow consistent with reduced compliance of   the   left ventricle.   The aortic valve is well visualized, appears mildly calcified. Valve   opening seems to be normal.   Normal appearing tricuspid valve structure.   Moderate (2+) tricuspid valve regurgitation is present.   Moderate pulmonary hypertension.   Normal appearing pulmonic valve structure.   Mild pulmonic valvular regurgitation (1+) is present.   The left atriumis mildly dilated.   Estimated left ventricular ejection fraction is 60-65 %.   The left ventricle is normal in size, wall motion and contractility as   seen in limited views.   Mild concentric left ventricular hypertrophy is present.   The right atrium appears borderline mildly dilated.   Normal appearing right ventricle structure and function.   IVC is dilated and not collapsing with inspiration.      RADIOLOGY & ADDITIONAL STUDIES:  < from: CT Chest No Cont (09.10.23 @ 19:57) >  IMPRESSION:  Interval increase in small bilateral pleural effusions.  Tree-in-bud opacities bilaterally may represent infectious small airway   disease.

## 2023-09-13 NOTE — PROGRESS NOTE ADULT - PROBLEM SELECTOR PLAN 1
PAF 2/2 infection/+EBV, now HR better controlled, LVEF is 60-65%  Cont, BB and dig, AC with Xarelto, EP consult appreciated   OPT telemetry.

## 2023-09-13 NOTE — PROGRESS NOTE ADULT - SUBJECTIVE AND OBJECTIVE BOX
REASON FOR VISIT: AF    HPI:  81 y/o woman with a history of CAD, PCI (LAD, 8/2023), PAF, polycythemia admitted on 9/2/23 with fever and recurrence of AF with RVR.    9/3/23 Events noted , patient developed shortness of breath with hypoxia  with high febrile episode ,  was having nausea ,  some back pain , temp 104  symptoms improved over some time , no recurrence of SOB while in CCU   CT chest did not show CHF or pneumonia    9/4/23 patient had mild degree of sob with febrile episode last night , no chest pain or shortness of breath now   9/5/23 Patient is feeling nauseous , mild abdominal discomfort , in afib with rapid rate since this morning ,   no sob   9/6/23 Feels well complains of palpitations  9/7/23:  Fatigue, resolving cough; no pain / dyspnea  9/8/23:  No new complaints; slept well; no palpitations or dyspnea; remains afebrile.  9/9/23 Pt denies cp, breathing improved.  Feels fatigued and weak.  Tele: afib 90's-110 with bursts up to 140  9/10/23:  Fatigue but no palpitations.  9/11/23: Awake alert feels weak but no CP/SOB, tele AF 80-90   9/12/23: Pt with no new complaints.  Tele: Afib 90's-110 with short burst up to 130  9/13/23: Seen in bed, feels better, Tele: Afib     Home Medications:  atorvastatin 20 mg oral tablet: 1 tab(s) orally once a day (03 Sep 2023 13:03)  dorzolamide-timolol 2.23%-0.68% (2%-0.5% base) ophthalmic solution: 1 drop(s) in each eye 2 times a day (03 Sep 2023 13:03)  hydroxyurea 500 mg oral capsule: 500 milligram(s) orally once a day take on M-W-F (02 Sep 2023 00:12)  loratadine 10 mg oral tablet: 1 tab(s) orally once a day (at bedtime) (02 Sep 2023 00:12)  metoprolol tartrate 25 mg oral tablet: 1 tab(s) orally 2 times a day (03 Sep 2023 13:03)  Xarelto 20 mg oral tablet: 1 tab(s) orally once a day (at bedtime) (02 Sep 2023 00:12)    MEDICATIONS  (STANDING):  acetaminophen   IVPB .. 1000 milliGRAM(s) IV Intermittent once  atorvastatin 20 milliGRAM(s) Oral at bedtime  cefTRIAXone Injectable. 1000 milliGRAM(s) IV Push every 24 hours  chlorhexidine 4% Liquid 1 Application(s) Topical <User Schedule>  clopidogrel Tablet 75 milliGRAM(s) Oral daily  digoxin     Tablet 125 MICROGram(s) Oral daily  dorzolamide 2%/timolol 0.5% Ophthalmic Solution 1 Drop(s) Both EYES two times a day  furosemide    Tablet 20 milliGRAM(s) Oral daily  hydroxyurea 500 milliGRAM(s) Oral <User Schedule>  loratadine 10 milliGRAM(s) Oral daily  metoprolol tartrate 50 milliGRAM(s) Oral two times a day  rivaroxaban 15 milliGRAM(s) Oral with dinner  sodium chloride 0.9%. 500 milliLiter(s) (100 mL/Hr) IV Continuous <Continuous>      MEDICATIONS  (PRN):  acetaminophen     Tablet .. 650 milliGRAM(s) Oral every 6 hours PRN Temp greater or equal to 38C (100.4F), Mild Pain (1 - 3)  aluminum hydroxide/magnesium hydroxide/simethicone Suspension 30 milliLiter(s) Oral every 4 hours PRN Dyspepsia  guaiFENesin Oral Liquid (Sugar-Free) 100 milliGRAM(s) Oral every 6 hours PRN Cough  melatonin 5 milliGRAM(s) Oral at bedtime PRN Insomnia  ondansetron Injectable 4 milliGRAM(s) IV Push every 8 hours PRN Nausea and/or Vomiting    Vital Signs Last 24 Hrs  T(C): 36.4 (13 Sep 2023 07:52), Max: 36.4 (13 Sep 2023 07:52)  T(F): 97.6 (13 Sep 2023 07:52), Max: 97.6 (13 Sep 2023 07:52)  HR: 104 (13 Sep 2023 07:52) (99 - 110)  BP: 128/87 (13 Sep 2023 07:52) (81/45 - 128/87)  BP(mean): --  RR: 18 (13 Sep 2023 07:52) (16 - 18)  SpO2: 100% (13 Sep 2023 07:52) (96% - 100%)    Parameters below as of 13 Sep 2023 07:52  Patient On (Oxygen Delivery Method): room air      PHYSICAL EXAM:  Constitutional: NAD, awake and alert  Respiratory: Breath sounds are clear bilaterally  Cardiovascular: S1 and S2, irregular, normal rate  Gastrointestinal: Bowel Sounds present, soft, nontender.   Extremities: No edema.     LABS:  reviewed                               11.6   5.60  )-----------( 405      ( 13 Sep 2023 06:16 )             34.9     09-13    141  |  112<H>  |  20  ----------------------------<  111<H>  4.0   |  25  |  0.73    Ca    8.7      13 Sep 2023 06:16      - TroponinI hsT: <-105.32                       TroponinI hsT: 105.32    12 Lead ECG (09.05.23 @ 07:42):   Atrial fibrillation with rapid ventricular response  Low voltage QRS  Nonspecific ST abnormality  Abnormal ECG  When compared with ECG of 02-SEP-2023 14:19,  Atrial fibrillation has replaced Sinus rhythm  ST now depressed in Inferior leads  ST now depressed in Anterolateral leads  Nonspecific T wave abnormality now evident in Inferior leads    TTE Echo Complete w/o Contrast w/ Doppler (09.02.23 @ 08:38) >   Mild to Moderate mitral regurgitation.   EA reversal of the mitral inflow consistent with reduced compliance of the left ventricle.   The aortic valve is well visualized, appears mildly calcified. Valve opening seems to be normal.   Normal appearing tricuspid valve structure. Moderate (2+) tricuspid valve regurgitation.   Moderate pulmonary hypertension.   Normal appearing pulmonic valve structure. Mild pulmonic valvular regurgitation.   The left atrium is mildly dilated.   Estimated left ventricular ejection fraction is 60-65 %.   The left ventricle is normal in size, wall motion and contractility as seen in limited views.   Mild concentric left ventricular hypertrophy.   The right atrium appears borderline mildly dilated.   Normal appearing right ventricle structure and function.   IVC is dilated and not collapsing with inspiration.    Tele: AF with

## 2023-09-13 NOTE — PROGRESS NOTE ADULT - SUBJECTIVE AND OBJECTIVE BOX
HPI:    83 y/o Female with a PMHx of left shoulder pain, stress incontinence, eczema, low back pain, arthritis, glaucoma, chronic rhinitis presents to the ED with complain of palpitations and dyspnea on exertion. Diagnosed with A-fib 3 weeks ago and placed on Xarelto Had stents placed 1 week ago with Dr. Toney and placed on Plavix and Metroprolol with improvement of exertional chest pressure.     Patient started on have sudden onset of palpitations while having dinner night before coming to ER. She was feeling out of breath yesterday evening going up the stairs, also felt chest pressure and tremors. Also had diarrhea, fever of 101 F, chills, and diaphoresis that subsided. Patient was found to be in A fib with RVR when she arrived in ED. She converted to NSR spontaneously.     9/9/2023- Seen at bedside with Dr. Garza, in no acute distress. aware to continue on Hydrea and f/u outpatient with Dr. Rodriguez.    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 1000 milliGRAM(s) IV Intermittent once  atorvastatin 20 milliGRAM(s) Oral at bedtime  cefTRIAXone Injectable. 1000 milliGRAM(s) IV Push every 24 hours  chlorhexidine 4% Liquid 1 Application(s) Topical <User Schedule>  clopidogrel Tablet 75 milliGRAM(s) Oral daily  digoxin     Tablet 125 MICROGram(s) Oral daily  dorzolamide 2%/timolol 0.5% Ophthalmic Solution 1 Drop(s) Both EYES two times a day  furosemide    Tablet 20 milliGRAM(s) Oral daily  hydroxyurea 500 milliGRAM(s) Oral <User Schedule>  loratadine 10 milliGRAM(s) Oral daily  metoprolol tartrate 50 milliGRAM(s) Oral two times a day  rivaroxaban 15 milliGRAM(s) Oral with dinner  sodium chloride 0.9%. 500 milliLiter(s) (100 mL/Hr) IV Continuous <Continuous>    MEDICATIONS  (PRN):    acetaminophen     Tablet .. 650 milliGRAM(s) Oral every 6 hours PRN Temp greater or equal to 38C (100.4F), Mild Pain (1 - 3)  ALPRAZolam 0.25 milliGRAM(s) Oral three times a day PRN Anxiety  aluminum hydroxide/magnesium hydroxide/simethicone Suspension 30 milliLiter(s) Oral every 4 hours PRN Dyspepsia  guaiFENesin Oral Liquid (Sugar-Free) 100 milliGRAM(s) Oral every 6 hours PRN Cough  melatonin 5 milliGRAM(s) Oral at bedtime PRN Insomnia  ondansetron Injectable 4 milliGRAM(s) IV Push every 8 hours PRN Nausea and/or Vomiting      Allergies    No Known Allergies    Review of Systems    Constitutional, Eyes, ENT, Cardiovascular, Respiratory, Gastrointestinal, Genitourinary, Musculoskeletal, Integumentary, Neurological, Psychiatric, Endocrine, Heme/Lymph and Allergic/Immunologic review of systems are otherwise negative except as noted in HPI.     Vital Signs Last 24 Hrs    Vital Signs Last 24 Hrs  T(C): 36.4 (13 Sep 2023 07:52), Max: 36.4 (13 Sep 2023 07:52)  T(F): 97.6 (13 Sep 2023 07:52), Max: 97.6 (13 Sep 2023 07:52)  HR: 104 (13 Sep 2023 07:52) (99 - 110)  BP: 128/87 (13 Sep 2023 07:52) (81/40 - 128/87)  BP(mean): --  RR: 18 (13 Sep 2023 07:52) (16 - 18)  SpO2: 100% (13 Sep 2023 07:52) (96% - 100%)    Parameters below as of 13 Sep 2023 07:52  Patient On (Oxygen Delivery Method): room air    Physical Exam    Eyes: no conjunctival infection, anicteric.   ENT: pharynx is unremarkable, moist mucus membrane, no oral lesions.   Neck: supple without JVD, no thyromegaly or masses appreciated.   Pulmonary: clear to auscultation bilaterally, no dullness, no wheezing.   Cardiac: RRR, normal S1S2, no murmurs, rubs, gallops.   Vascular: no JVD, no calf tenderness, venous stasis changes, varices.   Abdomen: normoactive bowel sounds, soft and nontender, no hepatosplenomegaly or masses appreciated.   Lymphatic: no peripheral adenopathy appreciated.   Musculoskeletal: full range of motion and no deformities appreciated.   Skin: normal appearance, no rash, nodules, vesicles, ulcers, erythema.   Neurology: grossly intact.   Psychiatric: affect appropriate.     LABS:                          11.6   5.60  )-----------( 405      ( 13 Sep 2023 06:16 )             34.9     09-13    141  |  112<H>  |  20  ----------------------------<  111<H>  4.0   |  25  |  0.73    Ca    8.7      13 Sep 2023 06:16      Urinalysis Basic - ( 08 Sep 2023 05:34 )    Color: x / Appearance: x / SG: x / pH: x  Gluc: 113 mg/dL / Ketone: x  / Bili: x / Urobili: x   Blood: x / Protein: x / Nitrite: x   Leuk Esterase: x / RBC: x / WBC x   Sq Epi: x / Non Sq Epi: x / Bacteria: x    RADIOLOGY & ADDITIONAL STUDIES:    EXAM:  CT ABDOMEN AND PELVIS IC      EXAM:  CT CHEST IC       PROCEDURE DATE:  09/02/2023      INTERPRETATION:  CLINICAL INFORMATION: Shortness of breath and sepsis    COMPARISON: Same day chest x-ray, chest CT 8/10/2023    CONTRAST/COMPLICATIONS:  IV Contrast: Omnipaque 350 (accession [id number]), IV contrast documented   in unlinked concurrent exam (accession [id number])  90 cc administered   10   cc discarded  Oral Contrast: NONE  Complications: None reported at time of study completion    PROCEDURE:  CT of the Chest, Abdomen and Pelvis was performed.  Sagittal and coronal reformats were performed.    FINDINGS:  CHEST:  LUNGS AND LARGE AIRWAYS: Thecentral airways are patent. Bibasilar   atelectasis.  PLEURA: Small bilateral pleural effusions.  VESSELS: Normal caliber aorta. Main pulmonary arteries are patent.  HEART: No cardiomegaly. No pericardial effusion. Coronary artery   calcifications are present.  MEDIASTINUM AND MICHAEL: No adenopathy.  CHEST WALL AND LOWER NECK: No masses.    ABDOMEN AND PELVIS:  LIVER: Normal.  BILE DUCTS: Nondilated.  GALLBLADDER: Diffuse nonspecific gallbladder wall edema.  SPLEEN: Normal.  PANCREAS: 6 mm cyst in the body without main duct dilatation.  ADRENALS: Normal.  KIDNEYS/URETERS: No calculi, hydronephrosis, or soft tissue attenuating   mass.    BLADDER: Normal.  REPRODUCTIVE ORGANS: No masses.    BOWEL: No bowel-related abnormality. No bowel obstruction or bowel   inflammation. Normal appendix and ileocecal region. No evidence of active   colitis or diverticulitis.  PERITONEUM: Small pelvic free fluid. No free air.  VESSELS: Aortoiliac atherosclerosis without aneurysm.  RETROPERITONEUM/LYMPH NODES: No adenopathy.  ABDOMINAL WALL: Normal.  BONES: No acute bony abnormality.    IMPRESSION:  *  No acute septic pathology.  *  No drainable collection.  *  Diffuse nonspecific gallbladder wall edema. Correlate for acute   cholecystitis.      EXAM:  NM HEPATOBILIARY IMG      PROCEDURE DATE:  09/05/2023      INTERPRETATION:  RADIOPHARMACEUTICAL:  99mTc-Mebrofenin  DOSE: 3.3 mCi IV    CLINICAL INFORMATION: 82 year old female with sepsis and gallbladder wall   edema, referred to evaluate for acute cholecystitis    TECHNIQUE: Dynamic images of the anterior abdomen were obtained for 40   minutes immediately following radiotracer injection. Static images of the   abdomen in the anterior projection was also obtained.    COMPARISON: No previous hepatobiliary scan for comparison    FINDINGS: There is prompt uptake of the injected radiotracer by the   hepatocytes. The gallbladder is first visualized at 15 minutes post   tracer injection and bowel activity by 25 minutes. There is normal tracer   clearance from the liver by the end of the study.    IMPRESSION: Normal hepatobiliary scan.    No scan evidence of acute cholecystitis.

## 2023-09-13 NOTE — PROGRESS NOTE ADULT - PROBLEM SELECTOR PLAN 1
PAF 2/2 infection/+EBV, now HR better controlled, LVEF is 60-65%  Cont, BB and dig, AC with Xarelto, EP consult appreciated   OPT telemetry

## 2023-09-14 LAB
GLUCOSE BLDC GLUCOMTR-MCNC: 110 MG/DL — HIGH (ref 70–99)
LACTATE SERPL-SCNC: 1.3 MMOL/L — SIGNIFICANT CHANGE UP (ref 0.7–2)

## 2023-09-14 PROCEDURE — 99233 SBSQ HOSP IP/OBS HIGH 50: CPT

## 2023-09-14 PROCEDURE — 99232 SBSQ HOSP IP/OBS MODERATE 35: CPT

## 2023-09-14 RX ORDER — DEXTROSE 50 % IN WATER 50 %
25 SYRINGE (ML) INTRAVENOUS ONCE
Refills: 0 | Status: DISCONTINUED | OUTPATIENT
Start: 2023-09-14 | End: 2023-09-15

## 2023-09-14 RX ORDER — GLUCAGON INJECTION, SOLUTION 0.5 MG/.1ML
1 INJECTION, SOLUTION SUBCUTANEOUS ONCE
Refills: 0 | Status: DISCONTINUED | OUTPATIENT
Start: 2023-09-14 | End: 2023-09-15

## 2023-09-14 RX ORDER — SODIUM CHLORIDE 9 MG/ML
1000 INJECTION, SOLUTION INTRAVENOUS
Refills: 0 | Status: DISCONTINUED | OUTPATIENT
Start: 2023-09-14 | End: 2023-09-14

## 2023-09-14 RX ORDER — DEXTROSE 50 % IN WATER 50 %
15 SYRINGE (ML) INTRAVENOUS ONCE
Refills: 0 | Status: DISCONTINUED | OUTPATIENT
Start: 2023-09-14 | End: 2023-09-15

## 2023-09-14 RX ORDER — METOPROLOL TARTRATE 50 MG
75 TABLET ORAL
Refills: 0 | Status: DISCONTINUED | OUTPATIENT
Start: 2023-09-14 | End: 2023-09-15

## 2023-09-14 RX ORDER — DEXTROSE 50 % IN WATER 50 %
50 SYRINGE (ML) INTRAVENOUS ONCE
Refills: 0 | Status: DISCONTINUED | OUTPATIENT
Start: 2023-09-14 | End: 2023-09-15

## 2023-09-14 RX ORDER — METOPROLOL TARTRATE 50 MG
25 TABLET ORAL ONCE
Refills: 0 | Status: COMPLETED | OUTPATIENT
Start: 2023-09-14 | End: 2023-09-14

## 2023-09-14 RX ORDER — DEXTROSE 50 % IN WATER 50 %
12.5 SYRINGE (ML) INTRAVENOUS ONCE
Refills: 0 | Status: DISCONTINUED | OUTPATIENT
Start: 2023-09-14 | End: 2023-09-15

## 2023-09-14 RX ADMIN — DORZOLAMIDE HYDROCHLORIDE TIMOLOL MALEATE 1 DROP(S): 20; 5 SOLUTION/ DROPS OPHTHALMIC at 10:58

## 2023-09-14 RX ADMIN — RIVAROXABAN 15 MILLIGRAM(S): KIT at 18:12

## 2023-09-14 RX ADMIN — Medication 50 MILLIGRAM(S): at 10:56

## 2023-09-14 RX ADMIN — ATORVASTATIN CALCIUM 20 MILLIGRAM(S): 80 TABLET, FILM COATED ORAL at 21:07

## 2023-09-14 RX ADMIN — CEFTRIAXONE 1000 MILLIGRAM(S): 500 INJECTION, POWDER, FOR SOLUTION INTRAMUSCULAR; INTRAVENOUS at 11:07

## 2023-09-14 RX ADMIN — Medication 125 MICROGRAM(S): at 10:56

## 2023-09-14 RX ADMIN — Medication 20 MILLIGRAM(S): at 10:57

## 2023-09-14 RX ADMIN — Medication 75 MILLIGRAM(S): at 21:07

## 2023-09-14 RX ADMIN — DORZOLAMIDE HYDROCHLORIDE TIMOLOL MALEATE 1 DROP(S): 20; 5 SOLUTION/ DROPS OPHTHALMIC at 21:12

## 2023-09-14 RX ADMIN — CLOPIDOGREL BISULFATE 75 MILLIGRAM(S): 75 TABLET, FILM COATED ORAL at 10:57

## 2023-09-14 RX ADMIN — CHLORHEXIDINE GLUCONATE 1 APPLICATION(S): 213 SOLUTION TOPICAL at 10:57

## 2023-09-14 RX ADMIN — Medication 25 MILLIGRAM(S): at 13:58

## 2023-09-14 RX ADMIN — LORATADINE 10 MILLIGRAM(S): 10 TABLET ORAL at 11:32

## 2023-09-14 NOTE — PROGRESS NOTE ADULT - PROBLEM SELECTOR PROBLEM 4
Tachycardia
Tachycardia
Elevated troponin
Tachycardia
Elevated troponin

## 2023-09-14 NOTE — PROGRESS NOTE ADULT - ASSESSMENT
82 yr old female with above PMHx admitted with AF with RVRs, fevers and rigors, found to have acute infectious mononucleosis and possible PNA. Her atrial fib was diagnosed recently as outpatient, she is on Xarelto. LVEF 60-65%.  Still having some RVRs and yesterday she became hypotensive with SBPs 70-80s.  IV fluid bolus was given, Cardizem was discontinued, IV Digoxin given, now on PO Dig.  She is on Lopressor 50mg BID.  Pt has RVRs with minimal activity.    A/P: AF with RVR in setting of infectious process and viral syndrome (+EBV)  Continue tele monitoring, rates are better today  bpm  Continue Lopressor 75 mg PO BID  Continue Dig PO  LVEF is 60-65%   She is still on IV antibiotics, afebrile, VSS today  Once infection is cleared, recommend [facility] for discharge to further monitor HR.  Encourage PO intake.  Plan d/w patient, RN, cardiology NP, Dr. Mendoza

## 2023-09-14 NOTE — PROGRESS NOTE ADULT - PROBLEM SELECTOR PROBLEM 2
CAD (coronary artery disease)
CAD (coronary artery disease)
High fever
High fever
CAD (coronary artery disease)
High fever
CAD (coronary artery disease)
High fever
CAD (coronary artery disease)

## 2023-09-14 NOTE — PROGRESS NOTE ADULT - NS ATTEND AMEND GEN_ALL_CORE FT
see A/P
Plan of care discussed with NP. Agree with plan of care.
Patient with AFIB with MIld RVR with soft Bp , would continue BB , discontinue cardizem , add digoxin 0.25 IV once  then 0.125 mg po daily hold if Hr < 60
agree w/ above.
Case discussed with NP; stable; management as described above.
Case discussed with NP; telemetry reviewed; stable / improved cardiac status.

## 2023-09-14 NOTE — PROGRESS NOTE ADULT - SUBJECTIVE AND OBJECTIVE BOX
81 y/o female with recent hospitalization for CAD, underwent LAD PCI, paroxysmal A.fib, thrombocythemia on hydroxyurea   Presented with palpitations and PASCAL at home   Also with fever, diarrhea   She was initially admitted to the floor, had an episode of A.fib with RVR, high fever of 104.5.  -found to have EBV acute infection; hosp course sign for uncontrolled Afib and Pna    9/4 Case discussed on CCU rounds, afebrile for HIDA in am.  9/5 Case discussed on CCU rounds, HIDA- negative, still with low grade temps., and AF RVR.  9/6 - patient had tmax 102F, and patient had worsening afib rvr, cardizem dripped went up to 10, s/p IV tylenol with improvement of her rigors.  s/p blood culture   9/7 - no fever overnight, diltiazem drip off overnight, net negative 1 L.    9.8: no cp, no sob, c/o coughing   9.9: +anxiety, Tele: AFib Rvr 120/min, no cp, no sob  9.10: better rate control, Afib 80-90s  +coughing   9.11: c/o coughing, +diaphoresis  Tele: Afib 80s  9.12: BP low today, Afib: 100s with bursts to 130/min at rest   +anxiety   9.13: BP stable today, Tele: Afib 100s at rest with bursts up to 140  coughing improving           PHYSICAL EXAM:  Vital Signs Last 24 Hrs  T(C): 36.5 (14 Sep 2023 07:27), Max: 36.6 (13 Sep 2023 20:20)  T(F): 97.7 (14 Sep 2023 07:27), Max: 97.8 (13 Sep 2023 20:20)  HR: 108 (14 Sep 2023 07:27) (97 - 108)  BP: 94/59 (14 Sep 2023 07:27) (94/59 - 110/64)  BP(mean): --  RR: 19 (14 Sep 2023 07:27) (16 - 19)  SpO2: 98% (14 Sep 2023 07:27) (97% - 98%)    Parameters below as of 14 Sep 2023 07:27  Patient On (Oxygen Delivery Method): room air      GENERAL: NAD  HEENT:  NC/AT, EOMI, PERRLA, No scleral icterus, Moist mucous membranes  NECK: Supple, No JVD  CNS:  Alert & Oriented X3, Motor Strength 5/5 B/L upper and lower extremities; DTRs 2+ intact   LUNG: Normal Breath sounds, Clear to auscultation bilaterally, +basilar rales, No rhonchi, No wheezing  HEART: irreg,  No murmurs, No rubs  ABDOMEN: +BS, ST/ND/NT  GENITOURINARY: Voiding, Bladder not distended  EXTREMITIES:  2+ Peripheral Pulses, No clubbing, No cyanosis, No tibial edema  MUSCULOSKELTAL: Joints normal ROM, No TTP, No effusion  VAGINAL: deferred  SKIN: no rashes  RECTAL: deferred, not indicated  BREAST: deferred                          11.6   5.60  )-----------( 405      ( 13 Sep 2023 06:16 )             34.9     09-13    141  |  112<H>  |  20  ----------------------------<  111<H>  4.0   |  25  |  0.73    Ca    8.7      13 Sep 2023 06:16            Urinalysis Basic - ( 13 Sep 2023 06:16 )    Color: x / Appearance: x / SG: x / pH: x  Gluc: 111 mg/dL / Ketone: x  / Bili: x / Urobili: x   Blood: x / Protein: x / Nitrite: x   Leuk Esterase: x / RBC: x / WBC x   Sq Epi: x / Non Sq Epi: x / Bacteria: x        CAPILLARY BLOOD GLUCOSE          MEDICATIONS  (STANDING):  acetaminophen   IVPB .. 1000 milliGRAM(s) IV Intermittent once  atorvastatin 20 milliGRAM(s) Oral at bedtime  cefTRIAXone Injectable. 1000 milliGRAM(s) IV Push every 24 hours  chlorhexidine 4% Liquid 1 Application(s) Topical <User Schedule>  clopidogrel Tablet 75 milliGRAM(s) Oral daily  dextrose 50% Injectable 25 Gram(s) IV Push once  dextrose 50% Injectable 25 Gram(s) IV Push once  dextrose 50% Injectable 50 milliLiter(s) IV Push once  dextrose 50% Injectable 12.5 Gram(s) IV Push once  digoxin     Tablet 125 MICROGram(s) Oral daily  dorzolamide 2%/timolol 0.5% Ophthalmic Solution 1 Drop(s) Both EYES two times a day  furosemide    Tablet 20 milliGRAM(s) Oral daily  glucagon  Injectable 1 milliGRAM(s) IntraMuscular once  hydroxyurea 500 milliGRAM(s) Oral <User Schedule>  loratadine 10 milliGRAM(s) Oral daily  metoprolol tartrate 50 milliGRAM(s) Oral two times a day  rivaroxaban 15 milliGRAM(s) Oral with dinner    MEDICATIONS  (PRN):  acetaminophen     Tablet .. 650 milliGRAM(s) Oral every 6 hours PRN Temp greater or equal to 38C (100.4F), Mild Pain (1 - 3)  ALPRAZolam 0.25 milliGRAM(s) Oral three times a day PRN Anxiety  aluminum hydroxide/magnesium hydroxide/simethicone Suspension 30 milliLiter(s) Oral every 4 hours PRN Dyspepsia  dextrose Oral Gel 15 Gram(s) Oral once PRN Blood Glucose LESS THAN 70 milliGRAM(s)/deciliter  guaiFENesin Oral Liquid (Sugar-Free) 100 milliGRAM(s) Oral every 6 hours PRN Cough  melatonin 5 milliGRAM(s) Oral at bedtime PRN Insomnia  ondansetron Injectable 4 milliGRAM(s) IV Push every 8 hours PRN Nausea and/or Vomiting      I&O's Summary                            81 y/o female with recent hospitalization for CAD, underwent LAD PCI, paroxysmal A.fib, thrombocythemia on hydroxyurea   Presented with palpitations and PASACL at home   Also with fever, diarrhea   She was initially admitted to the floor, had an episode of A.fib with RVR, high fever of 104.5.  -found to have EBV acute infection; hosp course sign for uncontrolled Afib and Pna    9/4 Case discussed on CCU rounds, afebrile for HIDA in am.  9/5 Case discussed on CCU rounds, HIDA- negative, still with low grade temps., and AF RVR.  9/6 - patient had tmax 102F, and patient had worsening afib rvr, cardizem dripped went up to 10, s/p IV tylenol with improvement of her rigors.  s/p blood culture   9/7 - no fever overnight, diltiazem drip off overnight, net negative 1 L.    9.8: no cp, no sob, c/o coughing   9.9: +anxiety, Tele: AFib Rvr 120/min, no cp, no sob  9.10: better rate control, Afib 80-90s  +coughing   9.11: c/o coughing, +diaphoresis  Tele: Afib 80s  9.12: BP low today, Afib: 100s with bursts to 130/min at rest   +anxiety   9.13: BP stable today, Tele: Afib 100s at rest with bursts up to 140  coughing improving   09/14 HR 90-10s , no chest pain, no shortness of breath     PHYSICAL EXAM:  Vital Signs Last 24 Hrs  T(C): 36.5 (14 Sep 2023 07:27), Max: 36.6 (13 Sep 2023 20:20)  T(F): 97.7 (14 Sep 2023 07:27), Max: 97.8 (13 Sep 2023 20:20)  HR: 108 (14 Sep 2023 07:27) (97 - 108)  BP: 94/59 (14 Sep 2023 07:27) (94/59 - 110/64)  BP(mean): --  RR: 19 (14 Sep 2023 07:27) (16 - 19)  SpO2: 98% (14 Sep 2023 07:27) (97% - 98%)    Parameters below as of 14 Sep 2023 07:27  Patient On (Oxygen Delivery Method): room air      GENERAL: NAD  HEENT:  NC/AT, EOMI, PERRLA, No scleral icterus, Moist mucous membranes  NECK: Supple, No JVD  CNS:  Alert & Oriented X3, Motor Strength 5/5 B/L upper and lower extremities; DTRs 2+ intact   LUNG: Normal Breath sounds, Clear to auscultation bilaterally, +basilar rales, No rhonchi, No wheezing  HEART: irreg,  No murmurs, No rubs  ABDOMEN: +BS, ST/ND/NT  GENITOURINARY: Voiding, Bladder not distended  EXTREMITIES:  2+ Peripheral Pulses, No clubbing, No cyanosis, No tibial edema  MUSCULOSKELTAL: Joints normal ROM, No TTP, No effusion  VAGINAL: deferred  SKIN: no rashes  RECTAL: deferred, not indicated  BREAST: deferred                          11.6   5.60  )-----------( 405      ( 13 Sep 2023 06:16 )             34.9     09-13    141  |  112<H>  |  20  ----------------------------<  111<H>  4.0   |  25  |  0.73    Ca    8.7      13 Sep 2023 06:16            Urinalysis Basic - ( 13 Sep 2023 06:16 )    Color: x / Appearance: x / SG: x / pH: x  Gluc: 111 mg/dL / Ketone: x  / Bili: x / Urobili: x   Blood: x / Protein: x / Nitrite: x   Leuk Esterase: x / RBC: x / WBC x   Sq Epi: x / Non Sq Epi: x / Bacteria: x        CAPILLARY BLOOD GLUCOSE          MEDICATIONS  (STANDING):  acetaminophen   IVPB .. 1000 milliGRAM(s) IV Intermittent once  atorvastatin 20 milliGRAM(s) Oral at bedtime  cefTRIAXone Injectable. 1000 milliGRAM(s) IV Push every 24 hours  chlorhexidine 4% Liquid 1 Application(s) Topical <User Schedule>  clopidogrel Tablet 75 milliGRAM(s) Oral daily  dextrose 50% Injectable 25 Gram(s) IV Push once  dextrose 50% Injectable 25 Gram(s) IV Push once  dextrose 50% Injectable 50 milliLiter(s) IV Push once  dextrose 50% Injectable 12.5 Gram(s) IV Push once  digoxin     Tablet 125 MICROGram(s) Oral daily  dorzolamide 2%/timolol 0.5% Ophthalmic Solution 1 Drop(s) Both EYES two times a day  furosemide    Tablet 20 milliGRAM(s) Oral daily  glucagon  Injectable 1 milliGRAM(s) IntraMuscular once  hydroxyurea 500 milliGRAM(s) Oral <User Schedule>  loratadine 10 milliGRAM(s) Oral daily  metoprolol tartrate 50 milliGRAM(s) Oral two times a day  rivaroxaban 15 milliGRAM(s) Oral with dinner    MEDICATIONS  (PRN):  acetaminophen     Tablet .. 650 milliGRAM(s) Oral every 6 hours PRN Temp greater or equal to 38C (100.4F), Mild Pain (1 - 3)  ALPRAZolam 0.25 milliGRAM(s) Oral three times a day PRN Anxiety  aluminum hydroxide/magnesium hydroxide/simethicone Suspension 30 milliLiter(s) Oral every 4 hours PRN Dyspepsia  dextrose Oral Gel 15 Gram(s) Oral once PRN Blood Glucose LESS THAN 70 milliGRAM(s)/deciliter  guaiFENesin Oral Liquid (Sugar-Free) 100 milliGRAM(s) Oral every 6 hours PRN Cough  melatonin 5 milliGRAM(s) Oral at bedtime PRN Insomnia  ondansetron Injectable 4 milliGRAM(s) IV Push every 8 hours PRN Nausea and/or Vomiting      I&O's Summary

## 2023-09-14 NOTE — PROGRESS NOTE ADULT - SUBJECTIVE AND OBJECTIVE BOX
REASON FOR VISIT: AF    HPI:  81 y/o woman with a history of CAD, PCI (LAD, 8/2023), PAF, polycythemia admitted on 9/2/23 with fever and recurrence of AF with RVR.    9/3/23 Events noted , patient developed shortness of breath with hypoxia  with high febrile episode ,  was having nausea ,  some back pain , temp 104  symptoms improved over some time , no recurrence of SOB while in CCU   CT chest did not show CHF or pneumonia    9/4/23 patient had mild degree of sob with febrile episode last night , no chest pain or shortness of breath now   9/5/23 Patient is feeling nauseous , mild abdominal discomfort , in afib with rapid rate since this morning ,   no sob   9/6/23 Feels well complains of palpitations  9/7/23:  Fatigue, resolving cough; no pain / dyspnea  9/8/23:  No new complaints; slept well; no palpitations or dyspnea; remains afebrile.  9/9/23 Pt denies cp, breathing improved.  Feels fatigued and weak.  Tele: afib 90's-110 with bursts up to 140  9/10/23:  Fatigue but no palpitations.  9/11/23: Awake alert feels weak but no CP/SOB, tele AF 80-90   9/12/23: Pt with no new complaints.  Tele: Afib 90's-110 with short burst up to 130  9/13/23: Seen in bed, feels better, Tele: Afib   9/14/23: no complaints, Tele: Afib/flutter 90s with bursts up to 110 when ambulating     Home Medications:  atorvastatin 20 mg oral tablet: 1 tab(s) orally once a day (03 Sep 2023 13:03)  dorzolamide-timolol 2.23%-0.68% (2%-0.5% base) ophthalmic solution: 1 drop(s) in each eye 2 times a day (03 Sep 2023 13:03)  hydroxyurea 500 mg oral capsule: 500 milligram(s) orally once a day take on M-W-F (02 Sep 2023 00:12)  loratadine 10 mg oral tablet: 1 tab(s) orally once a day (at bedtime) (02 Sep 2023 00:12)  metoprolol tartrate 25 mg oral tablet: 1 tab(s) orally 2 times a day (03 Sep 2023 13:03)  Xarelto 20 mg oral tablet: 1 tab(s) orally once a day (at bedtime) (02 Sep 2023 00:12)    MEDICATIONS  (STANDING):  acetaminophen   IVPB .. 1000 milliGRAM(s) IV Intermittent once  atorvastatin 20 milliGRAM(s) Oral at bedtime  cefTRIAXone Injectable. 1000 milliGRAM(s) IV Push every 24 hours  chlorhexidine 4% Liquid 1 Application(s) Topical <User Schedule>  clopidogrel Tablet 75 milliGRAM(s) Oral daily  dextrose 50% Injectable 50 milliLiter(s) IV Push once  dextrose 50% Injectable 12.5 Gram(s) IV Push once  dextrose 50% Injectable 25 Gram(s) IV Push once  dextrose 50% Injectable 25 Gram(s) IV Push once  digoxin     Tablet 125 MICROGram(s) Oral daily  dorzolamide 2%/timolol 0.5% Ophthalmic Solution 1 Drop(s) Both EYES two times a day  furosemide    Tablet 20 milliGRAM(s) Oral daily  glucagon  Injectable 1 milliGRAM(s) IntraMuscular once  hydroxyurea 500 milliGRAM(s) Oral <User Schedule>  loratadine 10 milliGRAM(s) Oral daily  metoprolol tartrate 50 milliGRAM(s) Oral two times a day  rivaroxaban 15 milliGRAM(s) Oral with dinner    MEDICATIONS  (PRN):  acetaminophen     Tablet .. 650 milliGRAM(s) Oral every 6 hours PRN Temp greater or equal to 38C (100.4F), Mild Pain (1 - 3)  aluminum hydroxide/magnesium hydroxide/simethicone Suspension 30 milliLiter(s) Oral every 4 hours PRN Dyspepsia  guaiFENesin Oral Liquid (Sugar-Free) 100 milliGRAM(s) Oral every 6 hours PRN Cough  melatonin 5 milliGRAM(s) Oral at bedtime PRN Insomnia  ondansetron Injectable 4 milliGRAM(s) IV Push every 8 hours PRN Nausea and/or Vomiting    Vital Signs Last 24 Hrs  T(C): 36.5 (14 Sep 2023 07:27), Max: 36.6 (13 Sep 2023 20:20)  T(F): 97.7 (14 Sep 2023 07:27), Max: 97.8 (13 Sep 2023 20:20)  HR: 108 (14 Sep 2023 07:27) (97 - 108)  BP: 94/59 (14 Sep 2023 07:27) (94/59 - 110/64)  BP(mean): --  RR: 19 (14 Sep 2023 07:27) (16 - 19)  SpO2: 98% (14 Sep 2023 07:27) (97% - 98%)    Parameters below as of 14 Sep 2023 07:27  Patient On (Oxygen Delivery Method): room air      PHYSICAL EXAM:  Constitutional: NAD, awake and alert  Respiratory: Breath sounds are clear bilaterally  Cardiovascular: S1 and S2, irregular, normal rate  Gastrointestinal: Bowel Sounds present, soft, nontender.   Extremities: No edema.     LABS:  reviewed                               11.6   5.60  )-----------( 405      ( 13 Sep 2023 06:16 )             34.9     09-13    141  |  112<H>  |  20  ----------------------------<  111<H>  4.0   |  25  |  0.73    Ca    8.7      13 Sep 2023 06:16      - TroponinI hsT: <-105.32                       TroponinI hsT: 105.32    12 Lead ECG (09.05.23 @ 07:42):   Atrial fibrillation with rapid ventricular response  Low voltage QRS  Nonspecific ST abnormality  Abnormal ECG  When compared with ECG of 02-SEP-2023 14:19,  Atrial fibrillation has replaced Sinus rhythm  ST now depressed in Inferior leads  ST now depressed in Anterolateral leads  Nonspecific T wave abnormality now evident in Inferior leads    TTE Echo Complete w/o Contrast w/ Doppler (09.02.23 @ 08:38) >   Mild to Moderate mitral regurgitation.   EA reversal of the mitral inflow consistent with reduced compliance of the left ventricle.   The aortic valve is well visualized, appears mildly calcified. Valve opening seems to be normal.   Normal appearing tricuspid valve structure. Moderate (2+) tricuspid valve regurgitation.   Moderate pulmonary hypertension.   Normal appearing pulmonic valve structure. Mild pulmonic valvular regurgitation.   The left atrium is mildly dilated.   Estimated left ventricular ejection fraction is 60-65 %.   The left ventricle is normal in size, wall motion and contractility as seen in limited views.   Mild concentric left ventricular hypertrophy.   The right atrium appears borderline mildly dilated.   Normal appearing right ventricle structure and function.   IVC is dilated and not collapsing with inspiration.    Tele: AF with      REASON FOR VISIT: AF    HPI:  83 y/o woman with a history of CAD, PCI (LAD, 8/2023), PAF, polycythemia admitted on 9/2/23 with fever and recurrence of AF with RVR.    9/3/23 Events noted , patient developed shortness of breath with hypoxia  with high febrile episode ,  was having nausea ,  some back pain , temp 104  symptoms improved over some time , no recurrence of SOB while in CCU   CT chest did not show CHF or pneumonia    9/4/23 patient had mild degree of sob with febrile episode last night , no chest pain or shortness of breath now   9/5/23 Patient is feeling nauseous , mild abdominal discomfort , in afib with rapid rate since this morning ,   no sob   9/6/23 Feels well complains of palpitations  9/7/23:  Fatigue, resolving cough; no pain / dyspnea  9/8/23:  No new complaints; slept well; no palpitations or dyspnea; remains afebrile.  9/9/23 Pt denies cp, breathing improved.  Feels fatigued and weak.  Tele: afib 90's-110 with bursts up to 140  9/10/23:  Fatigue but no palpitations.  9/11/23: Awake alert feels weak but no CP/SOB, tele AF 80-90   9/12/23: Pt with no new complaints.  Tele: Afib 90's-110 with short burst up to 130  9/13/23: Seen in bed, feels better, Tele: Afib   9/14/23: no complaints, Tele: Afib/flutter 90s with bursts up to 110 when ambulating     Home Medications:  atorvastatin 20 mg oral tablet: 1 tab(s) orally once a day (03 Sep 2023 13:03)  dorzolamide-timolol 2.23%-0.68% (2%-0.5% base) ophthalmic solution: 1 drop(s) in each eye 2 times a day (03 Sep 2023 13:03)  hydroxyurea 500 mg oral capsule: 500 milligram(s) orally once a day take on M-W-F (02 Sep 2023 00:12)  loratadine 10 mg oral tablet: 1 tab(s) orally once a day (at bedtime) (02 Sep 2023 00:12)  metoprolol tartrate 25 mg oral tablet: 1 tab(s) orally 2 times a day (03 Sep 2023 13:03)  Xarelto 20 mg oral tablet: 1 tab(s) orally once a day (at bedtime) (02 Sep 2023 00:12)    MEDICATIONS  (STANDING):  acetaminophen   IVPB .. 1000 milliGRAM(s) IV Intermittent once  atorvastatin 20 milliGRAM(s) Oral at bedtime  cefTRIAXone Injectable. 1000 milliGRAM(s) IV Push every 24 hours  chlorhexidine 4% Liquid 1 Application(s) Topical <User Schedule>  clopidogrel Tablet 75 milliGRAM(s) Oral daily  dextrose 50% Injectable 50 milliLiter(s) IV Push once  dextrose 50% Injectable 12.5 Gram(s) IV Push once  dextrose 50% Injectable 25 Gram(s) IV Push once  dextrose 50% Injectable 25 Gram(s) IV Push once  digoxin     Tablet 125 MICROGram(s) Oral daily  dorzolamide 2%/timolol 0.5% Ophthalmic Solution 1 Drop(s) Both EYES two times a day  furosemide    Tablet 20 milliGRAM(s) Oral daily  glucagon  Injectable 1 milliGRAM(s) IntraMuscular once  hydroxyurea 500 milliGRAM(s) Oral <User Schedule>  loratadine 10 milliGRAM(s) Oral daily  metoprolol tartrate 50 milliGRAM(s) Oral two times a day  rivaroxaban 15 milliGRAM(s) Oral with dinner    MEDICATIONS  (PRN):  acetaminophen     Tablet .. 650 milliGRAM(s) Oral every 6 hours PRN Temp greater or equal to 38C (100.4F), Mild Pain (1 - 3)  aluminum hydroxide/magnesium hydroxide/simethicone Suspension 30 milliLiter(s) Oral every 4 hours PRN Dyspepsia  guaiFENesin Oral Liquid (Sugar-Free) 100 milliGRAM(s) Oral every 6 hours PRN Cough  melatonin 5 milliGRAM(s) Oral at bedtime PRN Insomnia  ondansetron Injectable 4 milliGRAM(s) IV Push every 8 hours PRN Nausea and/or Vomiting    Vital Signs Last 24 Hrs  T(C): 36.5 (14 Sep 2023 07:27), Max: 36.6 (13 Sep 2023 20:20)  T(F): 97.7 (14 Sep 2023 07:27), Max: 97.8 (13 Sep 2023 20:20)  HR: 108 (14 Sep 2023 07:27) (97 - 108)  BP: 94/59 (14 Sep 2023 07:27) (94/59 - 110/64)  RR: 19 (14 Sep 2023 07:27) (16 - 19)  SpO2: 98% (14 Sep 2023 07:27) (97% - 98%)  Patient On (Oxygen Delivery Method): room air    PHYSICAL EXAM:  Constitutional: NAD, awake and alert  Respiratory: Breath sounds are clear bilaterally  Cardiovascular: S1 and S2, irregular, normal rate  Gastrointestinal: Bowel Sounds present, soft, nontender.   Extremities: No edema.     LABS:                   11.6   5.60  )-----------( 405      ( 13 Sep 2023 06:16 )             34.9     141  |  112<H>  |  20  ----------------------------<  111<H>  4.0   |  25  |  0.73    Ca    8.7      13 Sep 2023 06:16    12 Lead ECG (09.05.23 @ 07:42):  Atrial fibrillation with rapid ventricular response  Low voltage QRS  Nonspecific ST abnormality  Abnormal ECG  When compared with ECG of 02-SEP-2023 14:19,  Atrial fibrillation has replaced Sinus rhythm  ST now depressed in Inferior leads  ST now depressed in Anterolateral leads  Nonspecific T wave abnormality now evident in Inferior leads    TTE Echo Complete w/o Contrast w/ Doppler (09.02.23 @ 08:38) >   Mild to Moderate mitral regurgitation.   EA reversal of the mitral inflow consistent with reduced compliance of the left ventricle.   The aortic valve is well visualized, appears mildly calcified. Valve opening seems to be normal.   Normal appearing tricuspid valve structure. Moderate (2+) tricuspid valve regurgitation.   Moderate pulmonary hypertension.   Normal appearing pulmonic valve structure. Mild pulmonic valvular regurgitation.   The left atrium is mildly dilated.   Estimated left ventricular ejection fraction is 60-65 %.   The left ventricle is normal in size, wall motion and contractility as seen in limited views.   Mild concentric left ventricular hypertrophy.   The right atrium appears borderline mildly dilated.   Normal appearing right ventricle structure and function.   IVC is dilated and not collapsing with inspiration.    Tele: AF with

## 2023-09-14 NOTE — PROGRESS NOTE ADULT - PROBLEM SELECTOR PROBLEM 1
Paroxysmal atrial fibrillation
Shortness of breath
Paroxysmal atrial fibrillation
Shortness of breath
Paroxysmal atrial fibrillation
Shortness of breath
Paroxysmal atrial fibrillation
Shortness of breath
Paroxysmal atrial fibrillation
Paroxysmal atrial fibrillation

## 2023-09-14 NOTE — PROGRESS NOTE ADULT - ASSESSMENT
81 y/o F with hx of CAD , recent LAD PCI about 1 wek ago, afib, , thrombocythemia on hydroxyurea, present with palpitation , found to be in afib rvr, with fever.  Started diltiazem drip and antibiotic.  initial infectious workup unremarkable, CT showed gallbladder distention, HIDA negative.  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         1. Afib RVR  not controlled, HR 100s at rest  Lopressor increased, SBP soft  c/w Digoxin , Xarelto   cardiology evaluation underway   EP evaluation     Hypotension: resolved after bolusing 500cc  ? hypovelemia/dehydration     #. Pneumonia:  will check CT Chest : noted  Ceftriaxone IV day#4    On Low dose Lasix for small effusion     #CAD s/p recent PCI   Trops leveled , demand ischemia   c/w Plavix/Statins  on Xarelto for Afib    #EBV acute infection   EBV IgM +  outpatient hematology as EBV is known to be associated with ymphoproliferative disorders    #Mod pulm HTN     #Thrombocytosis  on Hydrea   outpatient Hem f/u    #Anxiety: Xanax prn        Mobility: PT consulted    GOC: full code        81 y/o F with hx of CAD , recent LAD PCI about 1 wek ago, afib, , thrombocythemia on hydroxyurea, present with palpitation , found to be in afib rvr, with fever.  Started diltiazem drip and antibiotic.  initial infectious workup unremarkable, CT showed gallbladder distention, HIDA negative.  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         1. Afib RVR  not controlled, HR 100s at rest  Lopressor increased, SBP soft  c/w Digoxin , Xarelto   cardiology evaluation underway   EP evaluation   Patient on Metoprolol l, will increase to 75,mg bid if BP allos     #Hypotension: resolved after bolusing 500cc  ? hypovolemia/dehydration     # Acute Diastolic heart failure   Bilateral Pleural Effusion  Patient on LAsix  PAtient on BB       #. Pneumonia:   CT Chest : noted  Ceftriaxone IV day#4    On Low dose Lasix for small effusion     #CAD s/p recent PCI   Trops leveled , demand ischemia   c/w Plavix/Statins  on Xarelto for Afib    #EBV acute infection   EBV IgM +  outpatient hematology as EBV is known to be associated with lymphoproliferative disorders    #Mod pulm HTN     #Thrombocytosis  on Hydrea   outpatient Hem f/u    #Anxiety: Xanax prn        Mobility: PT consulted    GOC: full code

## 2023-09-14 NOTE — PROGRESS NOTE ADULT - PROBLEM SELECTOR PLAN 3
as above , with rapid rate  She is on Cardizem 0.5 mg with rates >110. Will add digoxin 0.25 mg IVP.
as above , with rapid rate with symptoms.
Natali RAE in am
as above , with rapid rate  IV and oral lopressor was given , if continue to be tachycardic consider starting her on cardizem drip  ,   consider holding xarelto , start on heparin drip if any invasive procedure anticipated for gall bladder inflammation  after HIDA scan
Resolved; attributed to acute infectious mononucleosis.
as above , with rapid rate with symptoms.
Resolved; attributed to acute infectious mononucleosis.
Unclear etiology - blood cultures without growth; management per ID
w/u in progress  etiology not clear.
Resolved; attributed to acute infectious mononucleosis.
Unclear etiology - blood cultures without growth; ID assisting w/ work-up.
Resolved; attributed to acute infectious mononucleosis.
Unclear etiology;  fevers resolving; Abx as per critical care.

## 2023-09-14 NOTE — PROGRESS NOTE ADULT - PROBLEM/PLAN-1
DISPLAY PLAN FREE TEXT
Advancement Flap (Single) Text: The defect edges were debeveled with a #15 scalpel blade.  Given the location of the defect and the proximity to free margins a single advancement flap was deemed most appropriate.  Using a sterile surgical marker, an appropriate advancement flap was drawn incorporating the defect and placing the expected incisions within the relaxed skin tension lines where possible.    The area thus outlined was incised deep to adipose tissue with a #15 scalpel blade.  The skin margins were undermined to an appropriate distance in all directions utilizing iris scissors.

## 2023-09-14 NOTE — PROGRESS NOTE ADULT - PROBLEM SELECTOR PROBLEM 3
High fever
High fever
Paroxysmal atrial fibrillation
High fever
Paroxysmal atrial fibrillation
High fever
Paroxysmal atrial fibrillation
High fever
High fever
Paroxysmal atrial fibrillation
High fever

## 2023-09-14 NOTE — PROGRESS NOTE ADULT - SUBJECTIVE AND OBJECTIVE BOX
HPI:  83 y/o Female with a PMHx of left shoulder pain, stress incontinence, eczema, low back pain, arthritis, glaucoma, chronic rhinitis presents to the ED with complain of palpitations and dyspnea on exertion. The patient was diagnosed with afib 3 weeks ago and placed on xarelto. Had stents placed 1 week ago with Dr. Toney and placed on plavix and metroprolol with improvement of exertional chest pressure. Patient started on have sudden onset of palpitations while having dinner last evening. She was feeling out of breath yesterday evening going up the stairs, also felt chest pressure and tremors. On last Wednesday night, had diarrhea, fever of 101 F, chills, and diaphoresis that subsided yesterday. No other complain now. Patient was found to be in A fib with RVR when she arrived in ED yesterday. She converted to NSR spontaneously.  (02 Sep 2023 00:42)      23: EP asked to see pt who has been here since 23, events of hospitalization reviewed, acute infectious mononucleosis with fevers and LLL infiltrate, on IV antibiotics, Afib with RVRs, likely from illness.  On AV patrick agents for rate control, yesterday on  she became hypotensive with HR bursts to 130's.  Started on IV digoxin, cardizem discontinued.  Remains on Metoprolol  TELE: Atrial fibrillation 's, bursts of RVRs with activity  EK23: AF with  bpm, QRS 78ms, QTc 462ms    23: pt in bed resting, her biggest complaint is fatigue.  Says she is really asymptomatic of her HR, denies CP, palpitations.  Lopressor dose increased today to 75mg BID.  TELE: currently Afib 80-105bpm      MEDICATIONS  (STANDING):  acetaminophen   IVPB .. 1000 milliGRAM(s) IV Intermittent once  atorvastatin 20 milliGRAM(s) Oral at bedtime  cefTRIAXone Injectable. 1000 milliGRAM(s) IV Push every 24 hours  chlorhexidine 4% Liquid 1 Application(s) Topical <User Schedule>  clopidogrel Tablet 75 milliGRAM(s) Oral daily  dextrose 50% Injectable 25 Gram(s) IV Push once  dextrose 50% Injectable 25 Gram(s) IV Push once  dextrose 50% Injectable 50 milliLiter(s) IV Push once  dextrose 50% Injectable 12.5 Gram(s) IV Push once  digoxin     Tablet 125 MICROGram(s) Oral daily  dorzolamide 2%/timolol 0.5% Ophthalmic Solution 1 Drop(s) Both EYES two times a day  furosemide    Tablet 20 milliGRAM(s) Oral daily  glucagon  Injectable 1 milliGRAM(s) IntraMuscular once  hydroxyurea 500 milliGRAM(s) Oral <User Schedule>  loratadine 10 milliGRAM(s) Oral daily  metoprolol tartrate 75 milliGRAM(s) Oral two times a day  rivaroxaban 15 milliGRAM(s) Oral with dinner    MEDICATIONS  (PRN):  acetaminophen     Tablet .. 650 milliGRAM(s) Oral every 6 hours PRN Temp greater or equal to 38C (100.4F), Mild Pain (1 - 3)  ALPRAZolam 0.25 milliGRAM(s) Oral three times a day PRN Anxiety  aluminum hydroxide/magnesium hydroxide/simethicone Suspension 30 milliLiter(s) Oral every 4 hours PRN Dyspepsia  dextrose Oral Gel 15 Gram(s) Oral once PRN Blood Glucose LESS THAN 70 milliGRAM(s)/deciliter  guaiFENesin Oral Liquid (Sugar-Free) 100 milliGRAM(s) Oral every 6 hours PRN Cough  melatonin 5 milliGRAM(s) Oral at bedtime PRN Insomnia  ondansetron Injectable 4 milliGRAM(s) IV Push every 8 hours PRN Nausea and/or Vomiting    Allergies    No Known Allergies      REVIEW OF SYSTEMS:    CONSTITUTIONAL:  fatigue  HEENT:  Eyes:  No diplopia or blurred vision. ENT:  No earache, sore throat or runny nose.  CARDIOVASCULAR:  No pressure, squeezing, strangling, tightness, heaviness or aching about the chest, neck, axilla or epigastrium.  RESPIRATORY:  +occasional cough  GASTROINTESTINAL:  No nausea, vomiting or diarrhea.  GENITOURINARY:  No dysuria, frequency or urgency.  MUSCULOSKELETAL:  As per HPI.  SKIN:  No change in skin, hair or nails.  NEUROLOGIC:  No paresthesias, fasciculations, seizures or weakness.  PSYCHIATRIC:  No disorder of thought or mood.  ENDOCRINE:  No heat or cold intolerance, polyuria or polydipsia.  HEMATOLOGICAL:  No easy bruising or bleedings:  .   Vital Signs Last 24 Hrs  T(C): 36.8 (14 Sep 2023 13:58), Max: 36.8 (14 Sep 2023 13:58)  T(F): 98.2 (14 Sep 2023 13:58), Max: 98.2 (14 Sep 2023 13:58)  HR: 92 (14 Sep 2023 13:58) (92 - 108)  BP: 106/74 (14 Sep 2023 13:58) (94/59 - 110/64)  BP(mean): --  RR: 19 (14 Sep 2023 13:58) (16 - 19)  SpO2: 98% (14 Sep 2023 13:58) (97% - 98%)    Parameters below as of 14 Sep 2023 13:58  Patient On (Oxygen Delivery Method): room air                          11.6   5.60  )-----------( 405      ( 13 Sep 2023 06:16 )             34.9         141  |  112<H>  |  20  ----------------------------<  111<H>  4.0   |  25  |  0.73    Ca    8.7      13 Sep 2023 06:16    PHYSICAL EXAMINATION:    GENERAL APPEARANCE:  Pt. is not currently dyspneic, in no distress. Pt. is alert, oriented, and pleasant.  HEENT:  Pupils are normal and react normally. No icterus. Mucous membranes well colored.  NECK:  Supple. No lymphadenopathy. Jugular venous pressure not elevated. Carotids equal.   HEART:   The cardiac impulse has a normal quality. There are no murmurs, rubs or gallops noted  CHEST:  Chest is clear to auscultation. Normal respiratory effort.  ABDOMEN:  Soft and nontender.   EXTREMITIES:  There is no edema.   SKIN:  No rash or significant lesions are noted.      CARDIAC TESTS:      < from: TTE Echo Complete w/o Contrast w/ Doppler (23 @ 08:38) >   Impression     Summary     There is calcification of anterior mitral valve leaflet. The leaflet   opening is normal.   Mild mitral annular calcification is present.   Mild to Moderate mitral regurgitation is present.   EA reversal of the mitral inflow consistent with reduced compliance of   the   left ventricle.   The aortic valve is well visualized, appears mildly calcified. Valve   opening seems to be normal.   Normal appearing tricuspid valve structure.   Moderate (2+) tricuspid valve regurgitation is present.   Moderate pulmonary hypertension.   Normal appearing pulmonic valve structure.   Mild pulmonic valvular regurgitation (1+) is present.   The left atriumis mildly dilated.   Estimated left ventricular ejection fraction is 60-65 %.   The left ventricle is normal in size, wall motion and contractility as   seen in limited views.   Mild concentric left ventricular hypertrophy is present.   The right atrium appears borderline mildly dilated.   Normal appearing right ventricle structure and function.   IVC is dilated and not collapsing with inspiration.      RADIOLOGY & ADDITIONAL STUDIES:  < from: CT Chest No Cont (09.10.23 @ 19:57) >  IMPRESSION:  Interval increase in small bilateral pleural effusions.  Tree-in-bud opacities bilaterally may represent infectious small airway   disease.

## 2023-09-15 ENCOUNTER — TRANSCRIPTION ENCOUNTER (OUTPATIENT)
Age: 83
End: 2023-09-15

## 2023-09-15 VITALS
OXYGEN SATURATION: 96 % | TEMPERATURE: 97 F | DIASTOLIC BLOOD PRESSURE: 70 MMHG | SYSTOLIC BLOOD PRESSURE: 111 MMHG | HEART RATE: 94 BPM | RESPIRATION RATE: 18 BRPM

## 2023-09-15 LAB
A1C WITH ESTIMATED AVERAGE GLUCOSE RESULT: 6.6 % — HIGH (ref 4–5.6)
ANION GAP SERPL CALC-SCNC: 3 MMOL/L — LOW (ref 5–17)
BASOPHILS # BLD AUTO: 0.05 K/UL — SIGNIFICANT CHANGE UP (ref 0–0.2)
BASOPHILS NFR BLD AUTO: 1 % — SIGNIFICANT CHANGE UP (ref 0–2)
BUN SERPL-MCNC: 12 MG/DL — SIGNIFICANT CHANGE UP (ref 7–23)
CALCIUM SERPL-MCNC: 9.1 MG/DL — SIGNIFICANT CHANGE UP (ref 8.5–10.1)
CHLORIDE SERPL-SCNC: 112 MMOL/L — HIGH (ref 96–108)
CO2 SERPL-SCNC: 26 MMOL/L — SIGNIFICANT CHANGE UP (ref 22–31)
CREAT SERPL-MCNC: 0.71 MG/DL — SIGNIFICANT CHANGE UP (ref 0.5–1.3)
EGFR: 85 ML/MIN/1.73M2 — SIGNIFICANT CHANGE UP
EOSINOPHIL # BLD AUTO: 0.11 K/UL — SIGNIFICANT CHANGE UP (ref 0–0.5)
EOSINOPHIL NFR BLD AUTO: 2.2 % — SIGNIFICANT CHANGE UP (ref 0–6)
ESTIMATED AVERAGE GLUCOSE: 143 MG/DL — HIGH (ref 68–114)
GLUCOSE SERPL-MCNC: 116 MG/DL — HIGH (ref 70–99)
HCT VFR BLD CALC: 33.8 % — LOW (ref 34.5–45)
HGB BLD-MCNC: 11.1 G/DL — LOW (ref 11.5–15.5)
IMM GRANULOCYTES NFR BLD AUTO: 0.6 % — SIGNIFICANT CHANGE UP (ref 0–0.9)
LYMPHOCYTES # BLD AUTO: 0.8 K/UL — LOW (ref 1–3.3)
LYMPHOCYTES # BLD AUTO: 15.7 % — SIGNIFICANT CHANGE UP (ref 13–44)
MAGNESIUM SERPL-MCNC: 2.1 MG/DL — SIGNIFICANT CHANGE UP (ref 1.6–2.6)
MCHC RBC-ENTMCNC: 29.7 PG — SIGNIFICANT CHANGE UP (ref 27–34)
MCHC RBC-ENTMCNC: 32.8 GM/DL — SIGNIFICANT CHANGE UP (ref 32–36)
MCV RBC AUTO: 90.4 FL — SIGNIFICANT CHANGE UP (ref 80–100)
MONOCYTES # BLD AUTO: 0.59 K/UL — SIGNIFICANT CHANGE UP (ref 0–0.9)
MONOCYTES NFR BLD AUTO: 11.6 % — SIGNIFICANT CHANGE UP (ref 2–14)
NEUTROPHILS # BLD AUTO: 3.5 K/UL — SIGNIFICANT CHANGE UP (ref 1.8–7.4)
NEUTROPHILS NFR BLD AUTO: 68.9 % — SIGNIFICANT CHANGE UP (ref 43–77)
PHOSPHATE SERPL-MCNC: 3.4 MG/DL — SIGNIFICANT CHANGE UP (ref 2.5–4.5)
PLATELET # BLD AUTO: 421 K/UL — HIGH (ref 150–400)
POTASSIUM SERPL-MCNC: 4.1 MMOL/L — SIGNIFICANT CHANGE UP (ref 3.5–5.3)
POTASSIUM SERPL-SCNC: 4.1 MMOL/L — SIGNIFICANT CHANGE UP (ref 3.5–5.3)
RBC # BLD: 3.74 M/UL — LOW (ref 3.8–5.2)
RBC # FLD: 15.1 % — HIGH (ref 10.3–14.5)
SODIUM SERPL-SCNC: 141 MMOL/L — SIGNIFICANT CHANGE UP (ref 135–145)
WBC # BLD: 5.08 K/UL — SIGNIFICANT CHANGE UP (ref 3.8–10.5)
WBC # FLD AUTO: 5.08 K/UL — SIGNIFICANT CHANGE UP (ref 3.8–10.5)

## 2023-09-15 PROCEDURE — 99232 SBSQ HOSP IP/OBS MODERATE 35: CPT

## 2023-09-15 PROCEDURE — 99239 HOSP IP/OBS DSCHRG MGMT >30: CPT

## 2023-09-15 RX ORDER — FUROSEMIDE 40 MG
1 TABLET ORAL
Qty: 30 | Refills: 0
Start: 2023-09-15 | End: 2023-10-14

## 2023-09-15 RX ORDER — DIGOXIN 250 MCG
1 TABLET ORAL
Qty: 30 | Refills: 0
Start: 2023-09-15 | End: 2023-10-14

## 2023-09-15 RX ORDER — CEFUROXIME AXETIL 250 MG
1 TABLET ORAL
Qty: 10 | Refills: 0
Start: 2023-09-15 | End: 2023-09-19

## 2023-09-15 RX ORDER — METOPROLOL TARTRATE 50 MG
3 TABLET ORAL
Qty: 0 | Refills: 0 | DISCHARGE

## 2023-09-15 RX ORDER — FUROSEMIDE 40 MG
1 TABLET ORAL
Qty: 0 | Refills: 0 | DISCHARGE
Start: 2023-09-15

## 2023-09-15 RX ORDER — ACETAMINOPHEN 500 MG
2 TABLET ORAL
Qty: 0 | Refills: 0 | DISCHARGE
Start: 2023-09-15

## 2023-09-15 RX ADMIN — CEFTRIAXONE 1000 MILLIGRAM(S): 500 INJECTION, POWDER, FOR SOLUTION INTRAMUSCULAR; INTRAVENOUS at 12:26

## 2023-09-15 RX ADMIN — CHLORHEXIDINE GLUCONATE 1 APPLICATION(S): 213 SOLUTION TOPICAL at 06:26

## 2023-09-15 RX ADMIN — LORATADINE 10 MILLIGRAM(S): 10 TABLET ORAL at 11:20

## 2023-09-15 RX ADMIN — Medication 20 MILLIGRAM(S): at 11:20

## 2023-09-15 RX ADMIN — DORZOLAMIDE HYDROCHLORIDE TIMOLOL MALEATE 1 DROP(S): 20; 5 SOLUTION/ DROPS OPHTHALMIC at 11:22

## 2023-09-15 RX ADMIN — Medication 5 MILLIGRAM(S): at 00:58

## 2023-09-15 RX ADMIN — Medication 125 MICROGRAM(S): at 11:21

## 2023-09-15 RX ADMIN — CLOPIDOGREL BISULFATE 75 MILLIGRAM(S): 75 TABLET, FILM COATED ORAL at 11:21

## 2023-09-15 NOTE — PROGRESS NOTE ADULT - SUBJECTIVE AND OBJECTIVE BOX
HPI:  81 y/o Female with a PMHx of left shoulder pain, stress incontinence, eczema, low back pain, arthritis, glaucoma, chronic rhinitis presents to the ED with complain of palpitations and dyspnea on exertion. The patient was diagnosed with afib 3 weeks ago and placed on xarelto. Had stents placed 1 week ago with Dr. Toney and placed on plavix and metroprolol with improvement of exertional chest pressure. Patient started on have sudden onset of palpitations while having dinner last evening. She was feeling out of breath yesterday evening going up the stairs, also felt chest pressure and tremors. On last Wednesday night, had diarrhea, fever of 101 F, chills, and diaphoresis that subsided yesterday. No other complain now. Patient was found to be in A fib with RVR when she arrived in ED yesterday. She converted to NSR spontaneously.  (02 Sep 2023 00:42)      23: EP asked to see pt who has been here since 23, events of hospitalization reviewed, acute infectious mononucleosis with fevers and LLL infiltrate, on IV antibiotics, Afib with RVRs, likely from illness.  On AV patrick agents for rate control, yesterday on  she became hypotensive with HR bursts to 130's.  Started on IV digoxin, cardizem discontinued.  Remains on Metoprolol  TELE: Atrial fibrillation 's, bursts of RVRs with activity  EK23: AF with  bpm, QRS 78ms, QTc 462ms    23: pt in bed resting, her biggest complaint is fatigue.  Says she is really asymptomatic of her HR, denies CP, palpitations.  Lopressor dose increased today to 75mg BID.  TELE: currently Afib 80-105bpm    9/15/23 Patient seen at bedside, feeling better. Tolerating higher dose of Metoprolol. No events noted overnight.  Denies any CP, palpitations, SOB, dizziness, lightheadedness  TELE Afib 80-100bpm      MEDICATIONS  (STANDING):  acetaminophen   IVPB .. 1000 milliGRAM(s) IV Intermittent once  atorvastatin 20 milliGRAM(s) Oral at bedtime  cefTRIAXone Injectable. 1000 milliGRAM(s) IV Push every 24 hours  chlorhexidine 4% Liquid 1 Application(s) Topical <User Schedule>  clopidogrel Tablet 75 milliGRAM(s) Oral daily  dextrose 50% Injectable 25 Gram(s) IV Push once  dextrose 50% Injectable 25 Gram(s) IV Push once  dextrose 50% Injectable 50 milliLiter(s) IV Push once  dextrose 50% Injectable 12.5 Gram(s) IV Push once  digoxin     Tablet 125 MICROGram(s) Oral daily  dorzolamide 2%/timolol 0.5% Ophthalmic Solution 1 Drop(s) Both EYES two times a day  furosemide    Tablet 20 milliGRAM(s) Oral daily  glucagon  Injectable 1 milliGRAM(s) IntraMuscular once  hydroxyurea 500 milliGRAM(s) Oral <User Schedule>  loratadine 10 milliGRAM(s) Oral daily  metoprolol tartrate 75 milliGRAM(s) Oral two times a day  rivaroxaban 15 milliGRAM(s) Oral with dinner    MEDICATIONS  (PRN):  acetaminophen     Tablet .. 650 milliGRAM(s) Oral every 6 hours PRN Temp greater or equal to 38C (100.4F), Mild Pain (1 - 3)  ALPRAZolam 0.25 milliGRAM(s) Oral three times a day PRN Anxiety  aluminum hydroxide/magnesium hydroxide/simethicone Suspension 30 milliLiter(s) Oral every 4 hours PRN Dyspepsia  dextrose Oral Gel 15 Gram(s) Oral once PRN Blood Glucose LESS THAN 70 milliGRAM(s)/deciliter  guaiFENesin Oral Liquid (Sugar-Free) 100 milliGRAM(s) Oral every 6 hours PRN Cough  melatonin 5 milliGRAM(s) Oral at bedtime PRN Insomnia  ondansetron Injectable 4 milliGRAM(s) IV Push every 8 hours PRN Nausea and/or Vomiting    Vital Signs Last 24 Hrs  T(C): 36.3 (15 Sep 2023 08:44), Max: 36.8 (14 Sep 2023 13:58)  T(F): 97.4 (15 Sep 2023 08:44), Max: 98.2 (14 Sep 2023 13:58)  HR: 94 (15 Sep 2023 08:44) (92 - 98)  BP: 111/70 (15 Sep 2023 08:44) (106/58 - 111/70)  RR: 18 (15 Sep 2023 08:44) (18 - 19)  SpO2: 96% (15 Sep 2023 08:44) (96% - 98%)    Parameters below as of 15 Sep 2023 08:44  Patient On (Oxygen Delivery Method): room air    T(C): 36.3 (09-15-23 @ 08:44), Max: 36.8 (23 @ 13:58)  HR: 94 (09-15-23 @ 08:44) (92 - 98)  BP: 111/70 (09-15-23 @ 08:44) (106/58 - 111/70)  RR: 18 (09-15-23 @ 08:44) (18 - 19)  SpO2: 96% (09-15-23 @ 08:44) (96% - 98%)    General: NAD  Respiratory: CTAB, normal respiratory effort, no wheezes, crackles, rales  CV: Irregular, S1/S2, no murmurs, rubs or gallops  Abdominal: Soft, bowel sounds present, NT, ND +BS  Extremities: No edema, 2+ DP pulses  Neurological: A&Ox4, MAEx4, non-focal  Skin: No rashes    Labs:  09-15    141  |  112<H>  |  12  ----------------------------<  116<H>  4.1   |  26  |  0.71    Ca    9.1      15 Sep 2023 06:23  Phos  3.4     09-15  Mg     2.1     09-15                         11.1   5.08  )-----------( 421      ( 15 Sep 2023 06:23 )             33.8     Cardiology:   Impression     Summary     There is calcification of anterior mitral valve leaflet. The leaflet   opening is normal.   Mild mitral annular calcification is present.   Mild to Moderate mitral regurgitation is present.   EA reversal of the mitral inflow consistent with reduced compliance of   the   left ventricle.   The aortic valve is well visualized, appears mildly calcified. Valve   opening seems to be normal.   Normal appearing tricuspid valve structure.   Moderate (2+) tricuspid valve regurgitation is present.   Moderate pulmonary hypertension.   Normal appearing pulmonic valve structure.   Mild pulmonic valvular regurgitation (1+) is present.   The left atriumis mildly dilated.   Estimated left ventricular ejection fraction is 60-65 %.   The left ventricle is normal in size, wall motion and contractility as   seen in limited views.   Mild concentric left ventricular hypertrophy is present.   The right atrium appears borderline mildly dilated.   Normal appearing right ventricle structure and function.   IVC is dilated and not collapsing with inspiration.

## 2023-09-15 NOTE — DISCHARGE NOTE PROVIDER - NSDCFUSCHEDAPPT_GEN_ALL_CORE_FT
Toribio Case Physician [facility]  CARDIOLOGY [address]  Scheduled Appointment: 09/20/2023     Toribio Case  [facility]  CARDIOLOGY [address]  Scheduled Appointment: 09/20/2023    Celso Mendoza  [facility]  ELECTROPH [address]  Scheduled Appointment: 10/26/2023

## 2023-09-15 NOTE — DISCHARGE NOTE PROVIDER - NSDCCPCAREPLAN_GEN_ALL_CORE_FT
PRINCIPAL DISCHARGE DIAGNOSIS  Diagnosis: Atrial fibrillation with RVR  Assessment and Plan of Treatment:       SECONDARY DISCHARGE DIAGNOSES  Diagnosis: Pneumonia  Assessment and Plan of Treatment:     Diagnosis: Acute on chronic diastolic congestive heart failure  Assessment and Plan of Treatment:     Diagnosis: Palpitations  Assessment and Plan of Treatment:     Diagnosis: EBV infection  Assessment and Plan of Treatment:     Diagnosis: Bilateral pleural effusion  Assessment and Plan of Treatment:     Diagnosis: Hypotension  Assessment and Plan of Treatment:     Diagnosis: Status post insertion of drug-eluting stent into left anterior descending artery for coronary artery disease  Assessment and Plan of Treatment:     Diagnosis: CAD (coronary artery disease)  Assessment and Plan of Treatment:     Diagnosis: Atrial fibrillation with RVR  Assessment and Plan of Treatment:

## 2023-09-15 NOTE — PROGRESS NOTE ADULT - REASON FOR ADMISSION
Palpitation, Elevated troponin, A fib

## 2023-09-15 NOTE — DISCHARGE NOTE PROVIDER - NSDCMRMEDTOKEN_GEN_ALL_CORE_FT
acetaminophen 325 mg oral tablet: 2 tab(s) orally every 6 hours As needed Temp greater or equal to 38C (100.4F), Mild Pain (1 - 3)  atorvastatin 20 mg oral tablet: 1 tab(s) orally once a day  cefuroxime 500 mg oral tablet: 1 tab(s) orally 2 times a day  clopidogrel 75 mg oral tablet: 1 tab(s) orally once a day  digoxin 125 mcg (0.125 mg) oral tablet: 1 tab(s) orally once a day  dorzolamide-timolol 2.23%-0.68% (2%-0.5% base) ophthalmic solution: 1 drop(s) in each eye 2 times a day  furosemide 20 mg oral tablet: 1 tab(s) orally once a day  guaiFENesin 100 mg/5 mL oral liquid: 5 milliliter(s) orally every 6 hours As needed Cough  hydroxyurea 500 mg oral capsule: 500 milligram(s) orally once a day take on M-W-F  loratadine 10 mg oral tablet: 1 tab(s) orally once a day (at bedtime)  metoprolol tartrate 25 mg oral tablet: 3 tab(s) orally 2 times a day  Xarelto 20 mg oral tablet: 1 tab(s) orally once a day (at bedtime)

## 2023-09-15 NOTE — PROGRESS NOTE ADULT - PROVIDER SPECIALTY LIST ADULT
Critical Care
Hospitalist
Critical Care
Critical Care
Infectious Disease
Cardiology
Critical Care
Electrophysiology
Cardiology
Electrophysiology
Hospitalist
Hospitalist
Cardiology
Cardiology
Heme/Onc
Critical Care
Hospitalist
Critical Care
Critical Care
Hospitalist
Critical Care
Hospitalist
Cardiology
Cardiology
Hospitalist
Cardiology
Critical Care

## 2023-09-15 NOTE — DISCHARGE NOTE PROVIDER - CARE PROVIDER_API CALL
Toribio Case)  Cardiology  [address] [place] [address]  Phone: [phone]  Fax: [phone]  Follow Up Time: 1 week

## 2023-09-15 NOTE — DISCHARGE NOTE PROVIDER - HOSPITAL COURSE
83 y/o F with hx of CAD , recent LAD PCI about 1 wek ago, afib, , thrombocythemia on hydroxyurea, present with palpitation , found to be in afib RVR , with fever.  Started diltiazem drip and antibiotic.  initial infectious workup unremarkable, CT showed gallbladder distention, HIDA negative.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     #Afib RVR in a setting of EBV acute infection . Patient patient treated with  Lopressor and increase up to 75mh bid , added  Digoxin , Xarelto , Cardiology consulted appreciated  EP Consulted  LVEF is 60-65% .MCOT on discharge with outpatient EP follow up. Patient had Hypotension: resolved after bolusing 500cc ? hypovelemia/dehydration   Patient was treated  for  Pneumonia: Interval increase in small bilateral pleural effusions. Tree-in-bud opacities bilaterally may represent infectious small airway   disease. Patient was treated with Ceftriaxone IV . On Low dose Lasix for small effusion   #CAD s/p recent PCI  Trops leveled , demand ischemia  c/w Plavix/Statins. on Xarelto for Afib  #EBV acute infection EBV IgM +. outpatient hematology as EBV is known to be associated with ym phoproliferative disorders  #Mod pulm HTN c/w Lasix . Acute on chronic diastolic heart failure . #Thrombocytosis on Hydrea  outpatient Hem f/u  Age related/ illness related deconditioning , patient will be discharge to a [place] with PT .        There is calcification of anterior mitral valve leaflet. The leaflet   opening is normal.   Mild mitral annular calcification is present.   Mild to Moderate mitral regurgitation is present.   EA reversal of the mitral inflow consistent with reduced compliance of   the   left ventricle.   The aortic valve is well visualized, appears mildly calcified. Valve   opening seems to be normal.   Normal appearing tricuspid valve structure.   Moderate (2+) tricuspid valve regurgitation is present.   Moderate pulmonary hypertension.   Normal appearing pulmonic valve structure.   Mild pulmonic valvular regurgitation (1+) is present.   The left atriumis mildly dilated.   Estimated left ventricular ejection fraction is 60-65 %.   The left ventricle is normal in size, wall motion and contractility as   seen in limited views.   Mild concentric left ventricular hypertrophy is present.   The right atrium appears borderline mildly dilated.   Normal appearing right ventricle structure and function.   IVC is dilated and not collapsing with inspiration.                          11.1   5.08  )-----------( 421      ( 15 Sep 2023 06:23 )             33.8     09-15    141  |  112<H>  |  12  ----------------------------<  116<H>  4.1   |  26  |  0.71    Ca    9.1      15 Sep 2023 06:23  Phos  3.4     09-15  Mg     2.1     09-15            Urinalysis Basic - ( 15 Sep 2023 06:23 )    Color: x / Appearance: x / SG: x / pH: x  Gluc: 116 mg/dL / Ketone: x  / Bili: x / Urobili: x   Blood: x / Protein: x / Nitrite: x   Leuk Esterase: x / RBC: x / WBC x   Sq Epi: x / Non Sq Epi: x / Bacteria: x        CAPILLARY BLOOD GLUCOSE      POCT Blood Glucose.: 110 mg/dL (14 Sep 2023 22:55)       83 y/o F with hx of CAD , recent LAD PCI about 1 wek ago, afib, , thrombocythemia on hydroxyurea, present with palpitation , found to be in afib RVR , with fever.  Started diltiazem drip and antibiotic.  initial infectious workup unremarkable, CT showed gallbladder distention, HIDA negative.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     #Afib RVR in a setting of EBV acute infection . Patient patient treated with  Lopressor and increase up to 75mh bid , added  Digoxin , Xarelto , Cardiology consulted appreciated  EP Consulted  LVEF is 60-65% .MCOT on discharge with outpatient EP follow up. Patient had Hypotension: resolved after bolusing 500cc ? hypovelemia/dehydration   Patient was treated  for  Pneumonia: Interval increase in small bilateral pleural effusions. Tree-in-bud opacities bilaterally may represent infectious small airway   disease. Patient was treated with Ceftriaxone IV . On Low dose Lasix for small effusion   #CAD s/p recent PCI  Trops leveled , demand ischemia  c/w Plavix/Statins. on Xarelto for Afib  #EBV acute infection EBV IgM +. outpatient hematology as EBV is known to be associated with ym phoproliferative disorders  #Mod pulm HTN c/w Lasix . Acute on chronic diastolic heart failure .c/w BB, Diuretics  #Thrombocytosis on Hydrea  outpatient Hem f/u  Age related/ illness related deconditioning , patient will be discharge to a [facility] with PT .        There is calcification of anterior mitral valve leaflet. The leaflet   opening is normal.   Mild mitral annular calcification is present.   Mild to Moderate mitral regurgitation is present.   EA reversal of the mitral inflow consistent with reduced compliance of   the   left ventricle.   The aortic valve is well visualized, appears mildly calcified. Valve   opening seems to be normal.   Normal appearing tricuspid valve structure.   Moderate (2+) tricuspid valve regurgitation is present.   Moderate pulmonary hypertension.   Normal appearing pulmonic valve structure.   Mild pulmonic valvular regurgitation (1+) is present.   The left atriumis mildly dilated.   Estimated left ventricular ejection fraction is 60-65 %.   The left ventricle is normal in size, wall motion and contractility as   seen in limited views.   Mild concentric left ventricular hypertrophy is present.   The right atrium appears borderline mildly dilated.   Normal appearing right ventricle structure and function.   IVC is dilated and not collapsing with inspiration.                          11.1   5.08  )-----------( 421      ( 15 Sep 2023 06:23 )             33.8     09-15    141  |  112<H>  |  12  ----------------------------<  116<H>  4.1   |  26  |  0.71    Ca    9.1      15 Sep 2023 06:23  Phos  3.4     09-15  Mg     2.1     09-15            Urinalysis Basic - ( 15 Sep 2023 06:23 )    Color: x / Appearance: x / SG: x / pH: x  Gluc: 116 mg/dL / Ketone: x  / Bili: x / Urobili: x   Blood: x / Protein: x / Nitrite: x   Leuk Esterase: x / RBC: x / WBC x   Sq Epi: x / Non Sq Epi: x / Bacteria: x        CAPILLARY BLOOD GLUCOSE      POCT Blood Glucose.: 110 mg/dL (14 Sep 2023 22:55)

## 2023-09-15 NOTE — PROGRESS NOTE ADULT - ASSESSMENT
82 yr old female with above PMHx admitted with AF with RVRs, fevers and rigors, found to have acute infectious mononucleosis and possible PNA. Her atrial fib was diagnosed recently as outpatient, she is on Xarelto. LVEF 60-65%.  Still having some RVRs and yesterday she became hypotensive with SBPs 70-80s.  IV fluid bolus was given, Cardizem was discontinued, IV Digoxin given, now on PO Dig.  She is on Lopressor 50mg BID.  Pt has RVRs with minimal activity.    A/P: AF with RVR in setting of infectious process and viral syndrome (+EBV)  Continue tele monitoring, rates are better today  bpm  Continue Lopressor 75 mg PO BID  Continue Dig PO  LVEF is 60-65%   Patient for likely discharge today  MCOT on discharge with outpatient EP follow up  Dispo per medicine team

## 2023-09-15 NOTE — DISCHARGE NOTE PROVIDER - ATTENDING DISCHARGE PHYSICAL EXAMINATION:
Vital Signs Last 24 Hrs  T(C): 36.3 (15 Sep 2023 08:44), Max: 36.8 (14 Sep 2023 13:58)  T(F): 97.4 (15 Sep 2023 08:44), Max: 98.2 (14 Sep 2023 13:58)  HR: 94 (15 Sep 2023 08:44) (92 - 98)  BP: 111/70 (15 Sep 2023 08:44) (106/58 - 111/70)  BP(mean): --  RR: 18 (15 Sep 2023 08:44) (18 - 19)  SpO2: 96% (15 Sep 2023 08:44) (96% - 98%)  Parameters below as of 15 Sep 2023 08:44  Patient On (Oxygen Delivery Method): room air  GENERAL: NAD  HEENT:  NC/AT, EOMI, PERRLA, No scleral icterus, Moist mucous membranes  NECK: Supple, No JVD  CNS:  Alert & Oriented X3, Motor Strength 5/5 B/L upper and lower extremities; DTRs 2+ intact   LUNG: Normal Breath sounds, Clear to auscultation bilaterally, +basilar rales, No rhonchi, No wheezing  HEART: irreg,  No murmurs, No rubs  ABDOMEN: +BS, ST/ND/NT  GENITOURINARY: Voiding, Bladder not distended  EXTREMITIES:  2+ Peripheral Pulses, No clubbing, No cyanosis, No tibial edema  MUSCULOSKELTAL: Joints normal ROM, No TTP, No effusion  VAGINAL: deferred

## 2023-09-16 ENCOUNTER — TRANSCRIPTION ENCOUNTER (OUTPATIENT)
Age: 83
End: 2023-09-16

## 2023-09-18 ENCOUNTER — TRANSCRIPTION ENCOUNTER (OUTPATIENT)
Age: 83
End: 2023-09-18

## 2023-09-19 ENCOUNTER — TRANSCRIPTION ENCOUNTER (OUTPATIENT)
Age: 83
End: 2023-09-19

## 2023-09-20 ENCOUNTER — APPOINTMENT (OUTPATIENT)
Dept: CARDIOLOGY | Facility: CLINIC | Age: 83
End: 2023-09-20
Payer: MEDICARE

## 2023-09-20 ENCOUNTER — NON-APPOINTMENT (OUTPATIENT)
Age: 83
End: 2023-09-20

## 2023-09-20 VITALS
HEIGHT: 68 IN | RESPIRATION RATE: 16 BRPM | OXYGEN SATURATION: 98 % | SYSTOLIC BLOOD PRESSURE: 98 MMHG | BODY MASS INDEX: 2.27 KG/M2 | HEART RATE: 76 BPM | DIASTOLIC BLOOD PRESSURE: 61 MMHG | WEIGHT: 15 LBS

## 2023-09-20 DIAGNOSIS — J90 PLEURAL EFFUSION, NOT ELSEWHERE CLASSIFIED: ICD-10-CM

## 2023-09-20 DIAGNOSIS — M19.90 UNSPECIFIED OSTEOARTHRITIS, UNSPECIFIED SITE: ICD-10-CM

## 2023-09-20 DIAGNOSIS — J96.01 ACUTE RESPIRATORY FAILURE WITH HYPOXIA: ICD-10-CM

## 2023-09-20 DIAGNOSIS — Z95.5 PRESENCE OF CORONARY ANGIOPLASTY IMPLANT AND GRAFT: ICD-10-CM

## 2023-09-20 DIAGNOSIS — L30.9 DERMATITIS, UNSPECIFIED: ICD-10-CM

## 2023-09-20 DIAGNOSIS — I27.20 PULMONARY HYPERTENSION, UNSPECIFIED: ICD-10-CM

## 2023-09-20 DIAGNOSIS — I48.0 PAROXYSMAL ATRIAL FIBRILLATION: ICD-10-CM

## 2023-09-20 DIAGNOSIS — I24.8 OTHER FORMS OF ACUTE ISCHEMIC HEART DISEASE: ICD-10-CM

## 2023-09-20 DIAGNOSIS — J18.9 PNEUMONIA, UNSPECIFIED ORGANISM: ICD-10-CM

## 2023-09-20 DIAGNOSIS — D75.839 THROMBOCYTOSIS, UNSPECIFIED: ICD-10-CM

## 2023-09-20 DIAGNOSIS — H40.9 UNSPECIFIED GLAUCOMA: ICD-10-CM

## 2023-09-20 DIAGNOSIS — N39.3 STRESS INCONTINENCE (FEMALE) (MALE): ICD-10-CM

## 2023-09-20 DIAGNOSIS — I25.10 ATHEROSCLEROTIC HEART DISEASE OF NATIVE CORONARY ARTERY WITHOUT ANGINA PECTORIS: ICD-10-CM

## 2023-09-20 DIAGNOSIS — B27.00 GAMMAHERPESVIRAL MONONUCLEOSIS WITHOUT COMPLICATION: ICD-10-CM

## 2023-09-20 DIAGNOSIS — J98.11 ATELECTASIS: ICD-10-CM

## 2023-09-20 DIAGNOSIS — I50.21 ACUTE SYSTOLIC (CONGESTIVE) HEART FAILURE: ICD-10-CM

## 2023-09-20 DIAGNOSIS — I21.4 NON-ST ELEVATION (NSTEMI) MYOCARDIAL INFARCTION: ICD-10-CM

## 2023-09-20 PROCEDURE — 93000 ELECTROCARDIOGRAM COMPLETE: CPT

## 2023-09-20 PROCEDURE — 99214 OFFICE O/P EST MOD 30 MIN: CPT

## 2023-09-25 ENCOUNTER — TRANSCRIPTION ENCOUNTER (OUTPATIENT)
Age: 83
End: 2023-09-25

## 2023-09-25 ENCOUNTER — RX CHANGE (OUTPATIENT)
Age: 83
End: 2023-09-25

## 2023-09-30 LAB
ANION GAP SERPL CALC-SCNC: 9 MMOL/L
BUN SERPL-MCNC: 17 MG/DL
CALCIUM SERPL-MCNC: 9.7 MG/DL
CHLORIDE SERPL-SCNC: 106 MMOL/L
CO2 SERPL-SCNC: 27 MMOL/L
CREAT SERPL-MCNC: 0.97 MG/DL
EGFR: 58 ML/MIN/1.73M2
GLUCOSE SERPL-MCNC: 99 MG/DL
POTASSIUM SERPL-SCNC: 4.8 MMOL/L
SODIUM SERPL-SCNC: 143 MMOL/L

## 2023-10-02 ENCOUNTER — TRANSCRIPTION ENCOUNTER (OUTPATIENT)
Age: 83
End: 2023-10-02

## 2023-10-04 ENCOUNTER — TRANSCRIPTION ENCOUNTER (OUTPATIENT)
Age: 83
End: 2023-10-04

## 2023-10-05 ENCOUNTER — APPOINTMENT (OUTPATIENT)
Dept: CARDIOLOGY | Facility: CLINIC | Age: 83
End: 2023-10-05
Payer: MEDICARE

## 2023-10-05 VITALS
BODY MASS INDEX: 23.49 KG/M2 | HEIGHT: 68 IN | WEIGHT: 155 LBS | DIASTOLIC BLOOD PRESSURE: 54 MMHG | OXYGEN SATURATION: 98 % | SYSTOLIC BLOOD PRESSURE: 129 MMHG | RESPIRATION RATE: 18 BRPM | HEART RATE: 82 BPM

## 2023-10-05 DIAGNOSIS — I51.89 OTHER ILL-DEFINED HEART DISEASES: ICD-10-CM

## 2023-10-05 PROCEDURE — 99214 OFFICE O/P EST MOD 30 MIN: CPT

## 2023-10-05 RX ORDER — METOPROLOL TARTRATE 25 MG/1
25 TABLET, FILM COATED ORAL TWICE DAILY
Qty: 62 | Refills: 3 | Status: DISCONTINUED | COMMUNITY
Start: 2023-08-01 | End: 2023-10-05

## 2023-10-05 RX ORDER — CLOPIDOGREL 75 MG/1
75 TABLET, FILM COATED ORAL DAILY
Refills: 0 | Status: ACTIVE | COMMUNITY

## 2023-10-12 ENCOUNTER — TRANSCRIPTION ENCOUNTER (OUTPATIENT)
Age: 83
End: 2023-10-12

## 2023-10-13 ENCOUNTER — RX CHANGE (OUTPATIENT)
Age: 83
End: 2023-10-13

## 2023-10-26 ENCOUNTER — APPOINTMENT (OUTPATIENT)
Dept: CARDIOLOGY | Facility: CLINIC | Age: 83
End: 2023-10-26
Payer: MEDICARE

## 2023-10-26 ENCOUNTER — NON-APPOINTMENT (OUTPATIENT)
Age: 83
End: 2023-10-26

## 2023-10-26 ENCOUNTER — APPOINTMENT (OUTPATIENT)
Dept: ELECTROPHYSIOLOGY | Facility: CLINIC | Age: 83
End: 2023-10-26
Payer: MEDICARE

## 2023-10-26 VITALS
HEIGHT: 68 IN | WEIGHT: 155 LBS | HEART RATE: 94 BPM | OXYGEN SATURATION: 97 % | DIASTOLIC BLOOD PRESSURE: 68 MMHG | SYSTOLIC BLOOD PRESSURE: 112 MMHG | RESPIRATION RATE: 18 BRPM | BODY MASS INDEX: 23.49 KG/M2

## 2023-10-26 PROCEDURE — 99214 OFFICE O/P EST MOD 30 MIN: CPT

## 2023-10-26 PROCEDURE — 93000 ELECTROCARDIOGRAM COMPLETE: CPT

## 2023-10-26 RX ORDER — FUROSEMIDE 20 MG/1
20 TABLET ORAL
Refills: 0 | Status: COMPLETED | COMMUNITY

## 2023-10-26 RX ORDER — CEFUROXIME AXETIL 500 MG/1
500 TABLET ORAL TWICE DAILY
Refills: 0 | Status: COMPLETED | COMMUNITY

## 2023-11-02 ENCOUNTER — OFFICE (OUTPATIENT)
Dept: URBAN - METROPOLITAN AREA CLINIC 102 | Facility: CLINIC | Age: 83
Setting detail: OPHTHALMOLOGY
End: 2023-11-02
Payer: MEDICARE

## 2023-11-02 VITALS — HEIGHT: 55 IN

## 2023-11-02 DIAGNOSIS — H35.3121: ICD-10-CM

## 2023-11-02 DIAGNOSIS — H35.3112: ICD-10-CM

## 2023-11-02 DIAGNOSIS — H40.1121: ICD-10-CM

## 2023-11-02 DIAGNOSIS — H35.363: ICD-10-CM

## 2023-11-02 DIAGNOSIS — H16.223: ICD-10-CM

## 2023-11-02 DIAGNOSIS — H40.1112: ICD-10-CM

## 2023-11-02 DIAGNOSIS — H47.323: ICD-10-CM

## 2023-11-02 DIAGNOSIS — H25.13: ICD-10-CM

## 2023-11-02 DIAGNOSIS — H40.033: ICD-10-CM

## 2023-11-02 PROCEDURE — 92014 COMPRE OPH EXAM EST PT 1/>: CPT | Performed by: OPHTHALMOLOGY

## 2023-11-02 PROCEDURE — 92133 CPTRZD OPH DX IMG PST SGM ON: CPT | Performed by: OPHTHALMOLOGY

## 2023-11-02 ASSESSMENT — SPHEQUIV_DERIVED
OS_SPHEQUIV: 2.375
OD_SPHEQUIV: 2.625
OS_SPHEQUIV: 2.75
OD_SPHEQUIV: 2.625
OD_SPHEQUIV: 2.625
OS_SPHEQUIV: 2.375

## 2023-11-02 ASSESSMENT — REFRACTION_CURRENTRX
OS_VPRISM_DIRECTION: BF
OD_AXIS: 088
OD_AXIS: 85
OS_SPHERE: +3.00
OS_OVR_VA: 20/
OD_SPHERE: +3.25
OS_SPHERE: +3.00
OD_ADD: +2.25
OD_CYLINDER: -1.25
OS_CYLINDER: -1.25
OS_OVR_VA: 20/
OS_CYLINDER: -1.25
OS_AXIS: 102
OD_OVR_VA: 20/
OD_VPRISM_DIRECTION: BF
OD_CYLINDER: -1.00
OS_AXIS: 90
OD_VPRISM_DIRECTION: BF
OD_ADD: +3.00
OD_SPHERE: +3.00
OS_VPRISM_DIRECTION: BF
OS_ADD: +3.00
OS_ADD: +2.25
OD_OVR_VA: 20/

## 2023-11-02 ASSESSMENT — REFRACTION_AUTOREFRACTION
OS_SPHERE: +3.75
OD_SPHERE: +3.25
OD_CYLINDER: -1.25
OS_CYLINDER: -2.00
OD_AXIS: 092
OS_AXIS: 090

## 2023-11-02 ASSESSMENT — REFRACTION_MANIFEST
OS_ADD: +2.75
OS_CYLINDER: -1.25
OS_ADD: +2.75
OD_ADD: +2.75
OS_SPHERE: +3.00
OD_AXIS: 088
OS_CYLINDER: -1.25
OD_CYLINDER: -1.25
OD_AXIS: 088
OD_VA1: 20/50
OS_VA1: 20/25
OD_CYLINDER: -1.25
OS_AXIS: 102
OS_AXIS: 102
OD_ADD: +2.75
OS_VA1: 20/40
OD_SPHERE: +3.25
OS_SPHERE: +3.00
OD_SPHERE: +3.25
OD_VA1: 20/30

## 2023-11-02 ASSESSMENT — DECREASING TEAR LAKE - SEVERITY SCORE
OS_DEC_TEARLAKE: T
OD_DEC_TEARLAKE: T

## 2023-11-02 ASSESSMENT — CONFRONTATIONAL VISUAL FIELD TEST (CVF)
OS_FINDINGS: FULL
OD_FINDINGS: FULL

## 2023-11-03 ENCOUNTER — NON-APPOINTMENT (OUTPATIENT)
Age: 83
End: 2023-11-03

## 2023-11-04 ENCOUNTER — NON-APPOINTMENT (OUTPATIENT)
Age: 83
End: 2023-11-04

## 2023-11-16 ENCOUNTER — OUTPATIENT (OUTPATIENT)
Dept: OUTPATIENT SERVICES | Facility: HOSPITAL | Age: 83
LOS: 1 days | Discharge: HOME | End: 2023-11-16

## 2023-11-16 DIAGNOSIS — D47.3 ESSENTIAL (HEMORRHAGIC) THROMBOCYTHEMIA: ICD-10-CM

## 2023-11-16 DIAGNOSIS — Z41.9 ENCOUNTER FOR PROCEDURE FOR PURPOSES OTHER THAN REMEDYING HEALTH STATE, UNSPECIFIED: Chronic | ICD-10-CM

## 2023-11-16 DIAGNOSIS — Z98.890 OTHER SPECIFIED POSTPROCEDURAL STATES: Chronic | ICD-10-CM

## 2023-11-16 DIAGNOSIS — D75.839 THROMBOCYTOSIS, UNSPECIFIED: ICD-10-CM

## 2023-11-16 DIAGNOSIS — Z51.81 ENCOUNTER FOR THERAPEUTIC DRUG LEVEL MONITORING: ICD-10-CM

## 2023-11-16 DIAGNOSIS — Z90.89 ACQUIRED ABSENCE OF OTHER ORGANS: Chronic | ICD-10-CM

## 2023-11-21 ENCOUNTER — RESULT REVIEW (OUTPATIENT)
Age: 83
End: 2023-11-21

## 2023-11-21 ENCOUNTER — APPOINTMENT (OUTPATIENT)
Dept: HEMATOLOGY ONCOLOGY | Facility: CLINIC | Age: 83
End: 2023-11-21
Payer: MEDICARE

## 2023-11-21 VITALS
WEIGHT: 143.5 LBS | OXYGEN SATURATION: 98 % | HEIGHT: 68 IN | DIASTOLIC BLOOD PRESSURE: 75 MMHG | BODY MASS INDEX: 21.75 KG/M2 | HEART RATE: 58 BPM | TEMPERATURE: 98 F | RESPIRATION RATE: 16 BRPM | SYSTOLIC BLOOD PRESSURE: 113 MMHG

## 2023-11-21 LAB
BASOPHILS # BLD AUTO: 0.03 K/UL — SIGNIFICANT CHANGE UP (ref 0–0.2)
BASOPHILS # BLD AUTO: 0.03 K/UL — SIGNIFICANT CHANGE UP (ref 0–0.2)
BASOPHILS NFR BLD AUTO: 0.5 % — SIGNIFICANT CHANGE UP (ref 0–2)
BASOPHILS NFR BLD AUTO: 0.5 % — SIGNIFICANT CHANGE UP (ref 0–2)
EOSINOPHIL # BLD AUTO: 0.08 K/UL — SIGNIFICANT CHANGE UP (ref 0–0.5)
EOSINOPHIL # BLD AUTO: 0.08 K/UL — SIGNIFICANT CHANGE UP (ref 0–0.5)
EOSINOPHIL NFR BLD AUTO: 1.3 % — SIGNIFICANT CHANGE UP (ref 0–6)
EOSINOPHIL NFR BLD AUTO: 1.3 % — SIGNIFICANT CHANGE UP (ref 0–6)
HCT VFR BLD CALC: 35.2 % — SIGNIFICANT CHANGE UP (ref 34.5–45)
HCT VFR BLD CALC: 35.2 % — SIGNIFICANT CHANGE UP (ref 34.5–45)
HGB BLD-MCNC: 11.4 G/DL — LOW (ref 11.5–15.5)
HGB BLD-MCNC: 11.4 G/DL — LOW (ref 11.5–15.5)
IMM GRANULOCYTES NFR BLD AUTO: 0.2 % — SIGNIFICANT CHANGE UP (ref 0–0.9)
IMM GRANULOCYTES NFR BLD AUTO: 0.2 % — SIGNIFICANT CHANGE UP (ref 0–0.9)
LYMPHOCYTES # BLD AUTO: 0.76 K/UL — LOW (ref 1–3.3)
LYMPHOCYTES # BLD AUTO: 0.76 K/UL — LOW (ref 1–3.3)
LYMPHOCYTES # BLD AUTO: 12.5 % — LOW (ref 13–44)
LYMPHOCYTES # BLD AUTO: 12.5 % — LOW (ref 13–44)
MCHC RBC-ENTMCNC: 31.5 PG — SIGNIFICANT CHANGE UP (ref 27–34)
MCHC RBC-ENTMCNC: 31.5 PG — SIGNIFICANT CHANGE UP (ref 27–34)
MCHC RBC-ENTMCNC: 32.4 GM/DL — SIGNIFICANT CHANGE UP (ref 32–36)
MCHC RBC-ENTMCNC: 32.4 GM/DL — SIGNIFICANT CHANGE UP (ref 32–36)
MCV RBC AUTO: 97.2 FL — SIGNIFICANT CHANGE UP (ref 80–100)
MCV RBC AUTO: 97.2 FL — SIGNIFICANT CHANGE UP (ref 80–100)
MONOCYTES # BLD AUTO: 0.38 K/UL — SIGNIFICANT CHANGE UP (ref 0–0.9)
MONOCYTES # BLD AUTO: 0.38 K/UL — SIGNIFICANT CHANGE UP (ref 0–0.9)
MONOCYTES NFR BLD AUTO: 6.3 % — SIGNIFICANT CHANGE UP (ref 2–14)
MONOCYTES NFR BLD AUTO: 6.3 % — SIGNIFICANT CHANGE UP (ref 2–14)
NEUTROPHILS # BLD AUTO: 4.8 K/UL — SIGNIFICANT CHANGE UP (ref 1.8–7.4)
NEUTROPHILS # BLD AUTO: 4.8 K/UL — SIGNIFICANT CHANGE UP (ref 1.8–7.4)
NEUTROPHILS NFR BLD AUTO: 79.2 % — HIGH (ref 43–77)
NEUTROPHILS NFR BLD AUTO: 79.2 % — HIGH (ref 43–77)
NRBC # BLD: 0 /100 WBCS — SIGNIFICANT CHANGE UP (ref 0–0)
NRBC # BLD: 0 /100 WBCS — SIGNIFICANT CHANGE UP (ref 0–0)
PLATELET # BLD AUTO: 318 K/UL — SIGNIFICANT CHANGE UP (ref 150–400)
PLATELET # BLD AUTO: 318 K/UL — SIGNIFICANT CHANGE UP (ref 150–400)
RBC # BLD: 3.62 M/UL — LOW (ref 3.8–5.2)
RBC # BLD: 3.62 M/UL — LOW (ref 3.8–5.2)
RBC # FLD: 17.2 % — HIGH (ref 10.3–14.5)
RBC # FLD: 17.2 % — HIGH (ref 10.3–14.5)
WBC # BLD: 6.06 K/UL — SIGNIFICANT CHANGE UP (ref 3.8–10.5)
WBC # BLD: 6.06 K/UL — SIGNIFICANT CHANGE UP (ref 3.8–10.5)
WBC # FLD AUTO: 6.06 K/UL — SIGNIFICANT CHANGE UP (ref 3.8–10.5)
WBC # FLD AUTO: 6.06 K/UL — SIGNIFICANT CHANGE UP (ref 3.8–10.5)

## 2023-11-21 PROCEDURE — 99213 OFFICE O/P EST LOW 20 MIN: CPT

## 2023-11-21 RX ORDER — HYDROXYUREA 500 MG/1
500 CAPSULE ORAL
Qty: 36 | Refills: 3 | Status: ACTIVE | COMMUNITY
Start: 2023-11-21 | End: 1900-01-01

## 2023-12-07 ENCOUNTER — OUTPATIENT (OUTPATIENT)
Dept: OUTPATIENT SERVICES | Facility: HOSPITAL | Age: 83
LOS: 1 days | End: 2023-12-07
Payer: MEDICARE

## 2023-12-07 ENCOUNTER — APPOINTMENT (OUTPATIENT)
Dept: RADIOLOGY | Facility: CLINIC | Age: 83
End: 2023-12-07
Payer: MEDICARE

## 2023-12-07 ENCOUNTER — APPOINTMENT (OUTPATIENT)
Dept: INTERNAL MEDICINE | Facility: CLINIC | Age: 83
End: 2023-12-07
Payer: MEDICARE

## 2023-12-07 VITALS
WEIGHT: 143 LBS | BODY MASS INDEX: 21.67 KG/M2 | HEIGHT: 68 IN | SYSTOLIC BLOOD PRESSURE: 110 MMHG | TEMPERATURE: 97.6 F | DIASTOLIC BLOOD PRESSURE: 82 MMHG

## 2023-12-07 VITALS — OXYGEN SATURATION: 99 % | HEART RATE: 68 BPM

## 2023-12-07 DIAGNOSIS — Z98.890 OTHER SPECIFIED POSTPROCEDURAL STATES: Chronic | ICD-10-CM

## 2023-12-07 DIAGNOSIS — Z90.89 ACQUIRED ABSENCE OF OTHER ORGANS: Chronic | ICD-10-CM

## 2023-12-07 DIAGNOSIS — U07.1 COVID-19: ICD-10-CM

## 2023-12-07 DIAGNOSIS — R03.0 ELEVATED BLOOD-PRESSURE READING, W/OUT DIAGNOSIS OF HYPERTENSION: ICD-10-CM

## 2023-12-07 DIAGNOSIS — R73.03 PREDIABETES.: ICD-10-CM

## 2023-12-07 PROCEDURE — 71046 X-RAY EXAM CHEST 2 VIEWS: CPT | Mod: 26

## 2023-12-07 PROCEDURE — 71046 X-RAY EXAM CHEST 2 VIEWS: CPT

## 2023-12-07 PROCEDURE — 99214 OFFICE O/P EST MOD 30 MIN: CPT

## 2023-12-11 ENCOUNTER — NON-APPOINTMENT (OUTPATIENT)
Age: 83
End: 2023-12-11

## 2023-12-12 ENCOUNTER — NON-APPOINTMENT (OUTPATIENT)
Age: 83
End: 2023-12-12

## 2023-12-13 ENCOUNTER — OUTPATIENT (OUTPATIENT)
Dept: OUTPATIENT SERVICES | Facility: HOSPITAL | Age: 83
LOS: 1 days | Discharge: HOME | End: 2023-12-13
Payer: MEDICARE

## 2023-12-13 VITALS
DIASTOLIC BLOOD PRESSURE: 86 MMHG | OXYGEN SATURATION: 100 % | WEIGHT: 145.06 LBS | TEMPERATURE: 97 F | HEART RATE: 78 BPM | SYSTOLIC BLOOD PRESSURE: 144 MMHG | RESPIRATION RATE: 16 BRPM | HEIGHT: 68 IN

## 2023-12-13 VITALS
SYSTOLIC BLOOD PRESSURE: 100 MMHG | HEART RATE: 46 BPM | DIASTOLIC BLOOD PRESSURE: 69 MMHG | RESPIRATION RATE: 18 BRPM | OXYGEN SATURATION: 100 %

## 2023-12-13 DIAGNOSIS — Z98.890 OTHER SPECIFIED POSTPROCEDURAL STATES: Chronic | ICD-10-CM

## 2023-12-13 DIAGNOSIS — R94.4 ABNORMAL RESULTS OF KIDNEY FUNCTION STUDIES: ICD-10-CM

## 2023-12-13 DIAGNOSIS — Z41.9 ENCOUNTER FOR PROCEDURE FOR PURPOSES OTHER THAN REMEDYING HEALTH STATE, UNSPECIFIED: Chronic | ICD-10-CM

## 2023-12-13 DIAGNOSIS — I48.0 PAROXYSMAL ATRIAL FIBRILLATION: ICD-10-CM

## 2023-12-13 DIAGNOSIS — Z90.89 ACQUIRED ABSENCE OF OTHER ORGANS: Chronic | ICD-10-CM

## 2023-12-13 LAB
ANION GAP SERPL CALC-SCNC: 9 MMOL/L
BUN SERPL-MCNC: 24 MG/DL
CALCIUM SERPL-MCNC: 9.6 MG/DL
CHLORIDE SERPL-SCNC: 106 MMOL/L
CO2 SERPL-SCNC: 26 MMOL/L
CREAT SERPL-MCNC: 1.07 MG/DL
EGFR: 52 ML/MIN/1.73M2
ESTIMATED AVERAGE GLUCOSE: 123 MG/DL
GLUCOSE SERPL-MCNC: 93 MG/DL
HBA1C MFR BLD HPLC: 5.9 %
POTASSIUM SERPL-SCNC: 5 MMOL/L
SODIUM SERPL-SCNC: 141 MMOL/L

## 2023-12-13 PROCEDURE — 93005 ELECTROCARDIOGRAM TRACING: CPT | Mod: XU

## 2023-12-13 PROCEDURE — 93010 ELECTROCARDIOGRAM REPORT: CPT

## 2023-12-13 PROCEDURE — 92960 CARDIOVERSION ELECTRIC EXT: CPT

## 2023-12-13 NOTE — ASU PATIENT PROFILE, ADULT - FALL HARM RISK - UNIVERSAL INTERVENTIONS
Bed in lowest position, wheels locked, appropriate side rails in place/Call bell, personal items and telephone in reach/Instruct patient to call for assistance before getting out of bed or chair/Non-slip footwear when patient is out of bed/[place] to call system/Physically safe environment - no spills, clutter or unnecessary equipment/Purposeful Proactive Rounding/Room/bathroom lighting operational, light cord in reach Bed in lowest position, wheels locked, appropriate side rails in place/Call bell, personal items and telephone in reach/Instruct patient to call for assistance before getting out of bed or chair/Non-slip footwear when patient is out of bed/[place] to call system/Physically safe environment - no spills, clutter or unnecessary equipment/Purposeful Proactive Rounding/Room/bathroom lighting operational, light cord in reach

## 2023-12-13 NOTE — ASU PATIENT PROFILE, ADULT - NS PRO AD ANY ON CHART
EXAM: 

CHEST RADIOGRAPHY

 

EXAM DATE: 4/10/2018 12:50 PM.

 

CLINICAL HISTORY: Short of breath wheezing.

 

COMPARISON: 08/18/2017

 

TECHNIQUE: 1 view.

 

FINDINGS:

Lungs/Pleura: No focal opacities evident. No pleural effusion. No pneumothorax.

 

Mediastinum: Within exam limitations, the cardiomediastinal contour is normal.

 

Other: None.

 

IMPRESSION: Negative portable chest.

 

[facility]

Referring Provider Line: [phone]

 

SITE ID: [id number]

## 2023-12-13 NOTE — ASU PREOP CHECKLIST - INTERNAL PROSTHESES
Daughter Prabha called to check status. Writer advised daughter that it was done on 10/13/22 and sent over to [facility] by Mail   no

## 2023-12-13 NOTE — PACU DISCHARGE NOTE - COMMENTS
Discharge instructions given and all questions answered.  Pt tolerated procedure well, currently in NSR.  Pt amubaling without difficulty, VSS.  Safety maintained, awaiting transport

## 2023-12-15 DIAGNOSIS — I48.19 OTHER PERSISTENT ATRIAL FIBRILLATION: ICD-10-CM

## 2023-12-15 LAB
ALBUMIN SERPL ELPH-MCNC: 4.2 G/DL
ALP BLD-CCNC: 86 U/L
ALT SERPL-CCNC: 29 U/L
ANION GAP SERPL CALC-SCNC: 11 MMOL/L
AST SERPL-CCNC: 18 U/L
BILIRUB SERPL-MCNC: 0.4 MG/DL
BUN SERPL-MCNC: 20 MG/DL
CALCIUM SERPL-MCNC: 9.8 MG/DL
CHLORIDE SERPL-SCNC: 106 MMOL/L
CO2 SERPL-SCNC: 27 MMOL/L
CREAT SERPL-MCNC: 1.04 MG/DL
DIGOXIN SERPL-MCNC: 1 NG/ML
EGFR: 53 ML/MIN/1.73M2
GLUCOSE SERPL-MCNC: 104 MG/DL
POTASSIUM SERPL-SCNC: 4.9 MMOL/L
PROT SERPL-MCNC: 6.6 G/DL
SODIUM SERPL-SCNC: 144 MMOL/L

## 2023-12-18 LAB
ALBUMIN SERPL ELPH-MCNC: 4.5 G/DL
ALP BLD-CCNC: 82 U/L
ALT SERPL-CCNC: 28 U/L
AST SERPL-CCNC: 17 U/L
BILIRUB DIRECT SERPL-MCNC: 0.2 MG/DL
BILIRUB INDIRECT SERPL-MCNC: 0.3 MG/DL
BILIRUB SERPL-MCNC: 0.4 MG/DL
CHOLEST SERPL-MCNC: 122 MG/DL
HDLC SERPL-MCNC: 56 MG/DL
LDLC SERPL CALC-MCNC: 54 MG/DL
NONHDLC SERPL-MCNC: 66 MG/DL
PROT SERPL-MCNC: 6.8 G/DL
TRIGL SERPL-MCNC: 55 MG/DL

## 2023-12-19 NOTE — HISTORY OF PRESENT ILLNESS
[FreeTextEntry1] : Mrs Ochoa is an 82 year-old woman withCAD , recent LAD PCI atrial fibrillation, thrombocythemia on hydroxyurea, admitted September 2, 2023, with atrial fibrillation with RVR. She had fever.   She was found to have an acute EBV infection.  She was discharged with a monitor

## 2023-12-19 NOTE — PHYSICAL EXAM

## 2023-12-19 NOTE — ASSESSMENT
[FreeTextEntry1] : This is a 83 year old woman with atrial fibrillation after an acute EBV infection.   During this visit, NAA DURAN  and KEEGAN had a comprehensive discussion of arrhythmia management using principles of shared decision-making. This included a discussion of anti-arrhythmic medications including class I agents (e.g. flecainide), class III agents (e.g. amiodarone, dofetilide, sotalol, etc.), and both class II and IV agents (beta-blockers and calcium channel blockers). We reviewed the potentially life-threatening side effects of these medications, including (but not limited to) fatal tachyarrhythmias, pulmonary toxicity, liver toxicity, thyroid disorders. We also reviewed the requisite monitoring required of some of these medications. In addition, we reviewed ablative therapy, including the potential benefits and risks of catheter ablation. These risks include death, myocardial infarction, stroke, cardiac perforation, vascular injury, and other less severe complications. Ms. DURAN  demonstrated a clear understanding of these issues during our discussion. Will attempt to restore rhythm with a cardioversion.

## 2023-12-19 NOTE — CARDIOLOGY SUMMARY
[de-identified] : 10/26/23 : Atrial fibrillation  bpm. old anterior infarct. NS ST-T changes.  [de-identified] : 10/20/23 : MCOT 100% AF  rates to 147 bpm.

## 2023-12-27 NOTE — POST DISCHARGE NOTE - ADDITIONAL DOCUMENTATION:
Post procedure phone call completed; patient understood all discharge paperwork. No questions regarding medications or pain management. MD follow up appointment made. Patient was able to rest when they were discharged. Patient will recommend [facility], no complaints of hospital stay, satisfied with care. Instructed patient to contact provider with any further questions or concerns. Post procedure phone call completed; patient understood all discharge paperwork. No questions regarding medications or pain management. MD follow up appointment made. Patient was able to rest when they were discharged. Patient will recommend [facility], no complaints of hospital stay, satisfied with care. Instructed patient to contact provider with any further questions or concerns.

## 2024-01-12 ENCOUNTER — NON-APPOINTMENT (OUTPATIENT)
Age: 84
End: 2024-01-12

## 2024-01-12 ENCOUNTER — APPOINTMENT (OUTPATIENT)
Dept: ELECTROPHYSIOLOGY | Facility: CLINIC | Age: 84
End: 2024-01-12
Payer: MEDICARE

## 2024-01-12 VITALS
HEIGHT: 68 IN | RESPIRATION RATE: 16 BRPM | WEIGHT: 141 LBS | BODY MASS INDEX: 21.37 KG/M2 | DIASTOLIC BLOOD PRESSURE: 72 MMHG | OXYGEN SATURATION: 99 % | SYSTOLIC BLOOD PRESSURE: 120 MMHG | HEART RATE: 64 BPM

## 2024-01-12 DIAGNOSIS — I48.0 PAROXYSMAL ATRIAL FIBRILLATION: ICD-10-CM

## 2024-01-12 PROCEDURE — 93000 ELECTROCARDIOGRAM COMPLETE: CPT

## 2024-01-12 PROCEDURE — G2211 COMPLEX E/M VISIT ADD ON: CPT

## 2024-01-12 PROCEDURE — 99214 OFFICE O/P EST MOD 30 MIN: CPT

## 2024-01-12 RX ORDER — GUAIFENESIN 100 MG/5ML
100 LIQUID ORAL
Refills: 0 | Status: DISCONTINUED | COMMUNITY
End: 2024-01-12

## 2024-01-12 RX ORDER — METHYLPREDNISOLONE 4 MG/1
4 TABLET ORAL
Qty: 1 | Refills: 0 | Status: DISCONTINUED | COMMUNITY
Start: 2023-12-07 | End: 2024-01-12

## 2024-01-12 RX ORDER — FUROSEMIDE 20 MG/1
20 TABLET ORAL DAILY
Qty: 31 | Refills: 3 | Status: DISCONTINUED | COMMUNITY
End: 2024-01-12

## 2024-01-12 RX ORDER — METOPROLOL SUCCINATE 25 MG/1
25 TABLET, EXTENDED RELEASE ORAL
Qty: 90 | Refills: 3 | Status: ACTIVE | COMMUNITY
Start: 2024-01-12 | End: 1900-01-01

## 2024-01-12 RX ORDER — DIGOXIN 125 UG/1
125 TABLET ORAL DAILY
Refills: 0 | Status: DISCONTINUED | COMMUNITY
End: 2024-01-12

## 2024-01-29 PROBLEM — I48.0 PAROXYSMAL ATRIAL FIBRILLATION: Status: ACTIVE | Noted: 2023-08-01

## 2024-01-29 NOTE — CARDIOLOGY SUMMARY
[de-identified] : 01/12/24 Sinus heri 59 old ASMI.   10/26/23 : Atrial fibrillation  bpm. old anterior infarct. NS ST-T changes.  [de-identified] : 10/20/23 : MCOT 100% AF  rates to 147 bpm.  [de-identified] : 7/26/23 :  EST 1. Probably normal, but leads I, II and aVF were obscured by artifact and difficult to interpret. 2. The ECG is nondiagnostic for Ischemia due to failure to achieve 85% maximum predicted heart rate. 3. Patient achieved 7 METS, which is consistent with average exercise capacity. 4. Arrhythmias:. Rare APC noted in recovery. Multifocal PVCs noted at 2:11 in recovery, at which time pt c/o sensation in chest that was difficult to describe ("pressure", not pain, more like discomfort or feeling like she needed to catch her breath). 5. Blood pressure response: normal. 6. Did not achieve target heart rate. [de-identified] : 7/11/23 : TTE 1. The left ventricular systolic function is normal with an ejection fraction visually estimated at 60 to 65 %.2.The left atrium is mildly dilated.3.Mild mitral regurgitation.4.Trace aortic regurgitation.5.Severe tricuspid regurgitation.6.Moderate pulmonary hypertension.7.Mild pulmonic regurgitation [de-identified] : 8/23/23 :  1. Severe proximal LAD disease with IVUS revealing 360 degree concentric ring of calcium. Mild diffuse atherosclerosis in otherepicardial vessels. 2. Successul NELSON to pLAD after cutting balloon, shockwave lithotripsy. IVUS post revealing excellent stent expansion,apposition. 3. THOMAS 3 flow and no chest pain at the end of the case.

## 2024-01-29 NOTE — ASSESSMENT
[FreeTextEntry1] : This is a 83 year old woman with atrial fibrillation after an acute EBV infection.   During this visit, ANA ROGER  and KEEGAN had a comprehensive discussion of arrhythmia management using principles of shared decision-making. This included a discussion of anti-arrhythmic medications including class I agents (e.g. flecainide), class III agents (e.g. amiodarone, dofetilide, sotalol, etc.), and both class II and IV agents (beta-blockers and calcium channel blockers). We reviewed the potentially life-threatening side effects of these medications, including (but not limited to) fatal tachyarrhythmias, pulmonary toxicity, liver toxicity, thyroid disorders. We also reviewed the requisite monitoring required of some of these medications. In addition, we reviewed ablative therapy, including the potential benefits and risks of catheter ablation. These risks include death, myocardial infarction, stroke, cardiac perforation, vascular injury, and other less severe complications. Ms. DURAN  demonstrated a clear understanding of these issues during our discussion.   Remains in sinus with clinical improvement.  Will continue metoprolol.   Stress test, Echo,  Cath above reviewed.

## 2024-01-29 NOTE — HISTORY OF PRESENT ILLNESS
[FreeTextEntry1] : Mrs Duran is an 83 year-old woman with CAD , recent LAD PCI atrial fibrillation, thrombocythemia on hydroxyurea, admitted September 2, 2023, with atrial fibrillation with RVR. She had fever.   She was found to have an acute EBV infection.  She was discharged with a monitor that demonstrated persistent atrial fibrillation. She underwent cardioversion 12/13/23 . ANA DURAN  denies chest pain, chest pressure, shortness of breath, lightheadedness, dizziness, palpitations, syncope, presyncope, orthopnea, PND, or edema.

## 2024-01-29 NOTE — PHYSICAL EXAM

## 2024-01-30 ENCOUNTER — LABORATORY RESULT (OUTPATIENT)
Age: 84
End: 2024-01-30

## 2024-02-02 ENCOUNTER — APPOINTMENT (OUTPATIENT)
Dept: INTERNAL MEDICINE | Facility: CLINIC | Age: 84
End: 2024-02-02
Payer: MEDICARE

## 2024-02-02 VITALS
BODY MASS INDEX: 21.98 KG/M2 | SYSTOLIC BLOOD PRESSURE: 136 MMHG | WEIGHT: 145 LBS | DIASTOLIC BLOOD PRESSURE: 72 MMHG | HEART RATE: 54 BPM | HEIGHT: 68 IN | OXYGEN SATURATION: 98 %

## 2024-02-02 DIAGNOSIS — I25.10 ATHEROSCLEROTIC HEART DISEASE OF NATIVE CORONARY ARTERY W/OUT ANGINA PECTORIS: ICD-10-CM

## 2024-02-02 DIAGNOSIS — E11.9 TYPE 2 DIABETES MELLITUS W/OUT COMPLICATIONS: ICD-10-CM

## 2024-02-02 DIAGNOSIS — I10 ESSENTIAL (PRIMARY) HYPERTENSION: ICD-10-CM

## 2024-02-02 DIAGNOSIS — Z23 ENCOUNTER FOR IMMUNIZATION: ICD-10-CM

## 2024-02-02 DIAGNOSIS — I48.92 UNSPECIFIED ATRIAL FLUTTER: ICD-10-CM

## 2024-02-02 PROCEDURE — 90662 IIV NO PRSV INCREASED AG IM: CPT

## 2024-02-02 PROCEDURE — G0008: CPT

## 2024-02-02 PROCEDURE — 99214 OFFICE O/P EST MOD 30 MIN: CPT

## 2024-02-02 RX ORDER — METOPROLOL SUCCINATE 25 MG/1
25 TABLET, EXTENDED RELEASE ORAL
Qty: 90 | Refills: 2 | Status: DISCONTINUED | COMMUNITY
Start: 2023-10-05 | End: 2024-02-02

## 2024-02-02 NOTE — ASSESSMENT
[FreeTextEntry1] : 1.health maintenance: Influenza vaccine administered today. Went over side effects of vaccine.  Recent labs reviewed.  2.atrial flutter: Status post cardioversion, now in normal sinus rhythm, off digoxin will continue on metoprolol 25 mg daily and Xarelto 10 mg daily and follow-up with the EP and cardiology.  3.CAD: Status post LAD stent, continue on Plavix 75 mg daily and follow-up with cardiology.  4. prediabetes: Recent hemoglobin A1c doing well. Pt advised to keep diabetic diet, decrease carbs and increase dietary protein intake. Exercise as tolerated 3-4 times a week.

## 2024-02-02 NOTE — HISTORY OF PRESENT ILLNESS
[FreeTextEntry1] :  Follow Up Visit [de-identified] : ANA DURAN is a 83 year F who comes in for a follow up visit. Patient with CAD status post LAD stent, hypertension, prediabetes, atrial flutter.  Patient is status post cardioversion on December 13 and now in normal sinus rhythm.  She is now on digoxin per EP and continues on beta-blocker and Xarelto. PT also seen by hematology an dx with essential thrombocythemia and started on hydrea in 8/23 and repeat platelets are stable. Blood work done this week showed BMP within normal range now. Patient denies any cp, sob, abdominal pain, nausea, vomiting, palpitations, fever, chills, constipation, diarrhea.

## 2024-03-04 ENCOUNTER — RX RENEWAL (OUTPATIENT)
Age: 84
End: 2024-03-04

## 2024-03-05 RX ORDER — ATORVASTATIN CALCIUM 20 MG/1
20 TABLET, FILM COATED ORAL
Qty: 90 | Refills: 2 | Status: ACTIVE | COMMUNITY
Start: 2023-09-01 | End: 1900-01-01

## 2024-03-07 ENCOUNTER — RX RENEWAL (OUTPATIENT)
Age: 84
End: 2024-03-07

## 2024-03-07 RX ORDER — RIVAROXABAN 20 MG/1
20 TABLET, FILM COATED ORAL DAILY
Qty: 90 | Refills: 3 | Status: ACTIVE | COMMUNITY
Start: 2023-08-01 | End: 1900-01-01

## 2024-03-15 ENCOUNTER — OUTPATIENT (OUTPATIENT)
Dept: OUTPATIENT SERVICES | Facility: HOSPITAL | Age: 84
LOS: 1 days | Discharge: HOME | End: 2024-03-15

## 2024-03-15 DIAGNOSIS — Z41.9 ENCOUNTER FOR PROCEDURE FOR PURPOSES OTHER THAN REMEDYING HEALTH STATE, UNSPECIFIED: Chronic | ICD-10-CM

## 2024-03-15 DIAGNOSIS — D75.839 THROMBOCYTOSIS, UNSPECIFIED: ICD-10-CM

## 2024-03-15 DIAGNOSIS — Z90.89 ACQUIRED ABSENCE OF OTHER ORGANS: Chronic | ICD-10-CM

## 2024-03-15 DIAGNOSIS — Z98.890 OTHER SPECIFIED POSTPROCEDURAL STATES: Chronic | ICD-10-CM

## 2024-03-18 ENCOUNTER — RESULT REVIEW (OUTPATIENT)
Age: 84
End: 2024-03-18

## 2024-03-18 ENCOUNTER — APPOINTMENT (OUTPATIENT)
Dept: HEMATOLOGY ONCOLOGY | Facility: CLINIC | Age: 84
End: 2024-03-18
Payer: MEDICARE

## 2024-03-18 VITALS
WEIGHT: 143.06 LBS | RESPIRATION RATE: 16 BRPM | DIASTOLIC BLOOD PRESSURE: 74 MMHG | HEIGHT: 68 IN | TEMPERATURE: 98.1 F | SYSTOLIC BLOOD PRESSURE: 112 MMHG | HEART RATE: 52 BPM | BODY MASS INDEX: 21.68 KG/M2 | OXYGEN SATURATION: 98 %

## 2024-03-18 PROBLEM — D75.839 THROMBOCYTOSIS: Status: ACTIVE | Noted: 2023-06-16

## 2024-03-18 LAB
BASOPHILS # BLD AUTO: 0.05 K/UL — SIGNIFICANT CHANGE UP (ref 0–0.2)
BASOPHILS NFR BLD AUTO: 0.7 % — SIGNIFICANT CHANGE UP (ref 0–2)
EOSINOPHIL # BLD AUTO: 0.11 K/UL — SIGNIFICANT CHANGE UP (ref 0–0.5)
EOSINOPHIL NFR BLD AUTO: 1.5 % — SIGNIFICANT CHANGE UP (ref 0–6)
HCT VFR BLD CALC: 38.9 % — SIGNIFICANT CHANGE UP (ref 34.5–45)
HGB BLD-MCNC: 12.7 G/DL — SIGNIFICANT CHANGE UP (ref 11.5–15.5)
IMM GRANULOCYTES NFR BLD AUTO: 0.3 % — SIGNIFICANT CHANGE UP (ref 0–0.9)
LYMPHOCYTES # BLD AUTO: 0.88 K/UL — LOW (ref 1–3.3)
LYMPHOCYTES # BLD AUTO: 11.9 % — LOW (ref 13–44)
MCHC RBC-ENTMCNC: 31.6 PG — SIGNIFICANT CHANGE UP (ref 27–34)
MCHC RBC-ENTMCNC: 32.6 GM/DL — SIGNIFICANT CHANGE UP (ref 32–36)
MCV RBC AUTO: 96.8 FL — SIGNIFICANT CHANGE UP (ref 80–100)
MONOCYTES # BLD AUTO: 0.4 K/UL — SIGNIFICANT CHANGE UP (ref 0–0.9)
MONOCYTES NFR BLD AUTO: 5.4 % — SIGNIFICANT CHANGE UP (ref 2–14)
NEUTROPHILS # BLD AUTO: 5.92 K/UL — SIGNIFICANT CHANGE UP (ref 1.8–7.4)
NEUTROPHILS NFR BLD AUTO: 80.2 % — HIGH (ref 43–77)
NRBC # BLD: 0 /100 WBCS — SIGNIFICANT CHANGE UP (ref 0–0)
PLATELET # BLD AUTO: 330 K/UL — SIGNIFICANT CHANGE UP (ref 150–400)
RBC # BLD: 4.02 M/UL — SIGNIFICANT CHANGE UP (ref 3.8–5.2)
RBC # FLD: 14.8 % — HIGH (ref 10.3–14.5)
WBC # BLD: 7.38 K/UL — SIGNIFICANT CHANGE UP (ref 3.8–10.5)
WBC # FLD AUTO: 7.38 K/UL — SIGNIFICANT CHANGE UP (ref 3.8–10.5)

## 2024-03-18 PROCEDURE — 99213 OFFICE O/P EST LOW 20 MIN: CPT

## 2024-03-18 NOTE — REVIEW OF SYSTEMS
[Patient Intake Form Reviewed] : Patient intake form was reviewed [Shortness Of Breath] : shortness of breath [Cough] : cough [Diarrhea: Grade 0] : Diarrhea: Grade 0 [Negative] : Heme/Lymph [FreeTextEntry6] : recent Covid infection- improving

## 2024-03-18 NOTE — ASSESSMENT
[FreeTextEntry1] : 84 y/o female with ET diagnosed mid 2023. baseline very mild plts elevation. Positive for JAK2 V617F mutation. Because of age and cardiovascualr disease started on Hydrea in Mid August to maintain plts in normal range. Tolerating Hydrea well.  Continue current HYdrea dose 500 mg on Mondays, Wednesdays and Fridays.  return visit in 3-4 months

## 2024-03-18 NOTE — HISTORY OF PRESENT ILLNESS
[de-identified] : Presented in early 2023 with persistent mildly elevated plts. Asymptomatic. Initially monitored   [de-identified] : Recent onset chest pains - extensive cardiac evaluation. Diagnosed with A fib- on Xarelto and metoprolol. To get CT coronary angiogram.

## 2024-03-18 NOTE — REVIEW OF SYSTEMS
[Diarrhea: Grade 0] : Diarrhea: Grade 0 [FreeTextEntry5] : per HPI  [Negative] : Allergic/Immunologic

## 2024-03-18 NOTE — REVIEW OF SYSTEMS
[Diarrhea: Grade 0] : Diarrhea: Grade 0 [Patient Intake Form Reviewed] : Patient intake form was reviewed [Negative] : Allergic/Immunologic

## 2024-03-18 NOTE — HISTORY OF PRESENT ILLNESS
[de-identified] : 82 y/ female noted to have borderline elevated plts in 2022 425 K. recent labs showed plts  509 K Normal WBC and H/H  Currently being treated  by dermatology for infected sebaceous cyst on scalp, s/p excision, recently had pus drained , s/p antibiotics. Healing slowly.  Overall feels well. Active. 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

## 2024-03-18 NOTE — HISTORY OF PRESENT ILLNESS
[de-identified] : Presented in Feb/ March 2023 with persistent mild thrombocythemia  plts ~ 550 K.  NO 2 V617F positive.  Started Hydrea in August 2023  500 mg Mon-Wed - Fri. Tolerating well.   [de-identified] : Had stent placement in LAD last fall. Also A fib - s/p cardioversion in Dec 2023- now in sinus rhythm. Continues on metoprolol, placix and Xarelto.  Feels well.

## 2024-03-18 NOTE — PHYSICAL EXAM
[Normal] : well developed, well nourished, in no acute distress [de-identified] : no leg edema [de-identified] : looks well  [de-identified] : no splenomegaly

## 2024-03-18 NOTE — PHYSICAL EXAM
[Normal] : well developed, well nourished, in no acute distress [de-identified] : no splenomegaly

## 2024-03-18 NOTE — HISTORY OF PRESENT ILLNESS
[de-identified] : 84 y/o female presented with mildly elevated plts ~ 550 K in Feb March 2023.  No  evidence of reactive thrombocythemia.  Positive for JAK2 V617F mutation.  Because of age and cardiac comorbidities - started cytoreductive tx with Hydrea  to maintain plts in normal range  Started Hydrea in August 2023  500 mg Mon-Wed - Fri  [de-identified] : Tolerating Hydrea without any issues.   Found to have significant stenosis of LAD, s/p stent

## 2024-03-18 NOTE — PHYSICAL EXAM
[Normal] : no peripheral adenopathy appreciated [de-identified] : no splenomegaly  [de-identified] : looks well

## 2024-03-18 NOTE — ASSESSMENT
[FreeTextEntry1] : 84 y/o femal with ET diagnosed in 2023 ( baseline plts mildly elevated, JAK2 V617F positive. Started on Hydrea 500 mg on Mon- Wed/ Fri ( 3 tabs per week) with very good counts control. Continue current dose of Hydrea. Continues Plavix and Xarelto per cardiology return visit in 4 months

## 2024-03-18 NOTE — CONSULT LETTER
[Dear  ___] : Dear  [unfilled], [( Thank you for referring [unfilled] for consultation for _____ )] : Thank you for referring [unfilled] for consultation for [unfilled] [Please see my note below.] : Please see my note below. [Consult Closing:] : Thank you very much for allowing me to participate in the care of this patient.  If you have any questions, please do not hesitate to contact me. [Sincerely,] : Sincerely, [FreeTextEntry2] : Dr hSani Jade [FreeTextEntry3] : Willow Rodriguez

## 2024-03-18 NOTE — ASSESSMENT
[FreeTextEntry1] : 84 y/o female with cardiac hx ( CAD, Afib ) found to ahve persistetn mild plts  elevation ( ~ 500- 550K) , positive for JAK2 V617F mutation. No splenomegaly. Normal H/H. Clinical presentation suggests ET. Discussed indications for treatment and goals. Because of age and  cardiovascular disease- will start cytoreductive tx with Hydrea to maintain plts in normal range Discussed side effects of Hydrea and monitoring.  Rx Hydrea 500 mg on Mondays Wednesdays and Fridays.  Return visit in 2 weeks for CBC/ next monitor ~ monthly

## 2024-03-18 NOTE — ASSESSMENT
[FreeTextEntry1] : 84 y/o female with mildly elevated plts : reactive secondary to scalp infection versus developing primary myeloproliferative disorder.  Sent ESR and NO 2.  She  is already on ASA 81 mg daily.   return visit in 4-6 weeks.  Discussed diagnosis of management  of essential thrombocythemia and indications for treatment.

## 2024-03-19 DIAGNOSIS — D47.3 ESSENTIAL (HEMORRHAGIC) THROMBOCYTHEMIA: ICD-10-CM

## 2024-04-29 NOTE — PHYSICAL EXAM
[Normal] : well developed, well nourished, in no acute distress [de-identified] : looks well [de-identified] : no splenomegaly How Severe Is Your Skin Lesion?: mild Have Your Skin Lesions Been Treated?: not been treated Is This A New Presentation, Or A Follow-Up?: Skin Lesions

## 2024-04-30 PROBLEM — H25.13 CATARACT; BOTH EYES: Status: ACTIVE | Noted: 2024-04-30

## 2024-05-06 ENCOUNTER — OFFICE (OUTPATIENT)
Dept: URBAN - METROPOLITAN AREA CLINIC 102 | Facility: CLINIC | Age: 84
Setting detail: OPHTHALMOLOGY
End: 2024-05-06
Payer: MEDICARE

## 2024-05-06 DIAGNOSIS — H35.363: ICD-10-CM

## 2024-05-06 DIAGNOSIS — H25.13: ICD-10-CM

## 2024-05-06 DIAGNOSIS — H40.1121: ICD-10-CM

## 2024-05-06 DIAGNOSIS — H35.3112: ICD-10-CM

## 2024-05-06 DIAGNOSIS — H40.033: ICD-10-CM

## 2024-05-06 DIAGNOSIS — H35.3121: ICD-10-CM

## 2024-05-06 DIAGNOSIS — H47.323: ICD-10-CM

## 2024-05-06 DIAGNOSIS — H16.223: ICD-10-CM

## 2024-05-06 DIAGNOSIS — H40.1112: ICD-10-CM

## 2024-05-06 PROBLEM — H52.7 REFRACTIVE ERROR: Status: ACTIVE | Noted: 2024-05-06

## 2024-05-06 PROCEDURE — 92083 EXTENDED VISUAL FIELD XM: CPT | Performed by: OPHTHALMOLOGY

## 2024-05-06 PROCEDURE — 92014 COMPRE OPH EXAM EST PT 1/>: CPT | Performed by: OPHTHALMOLOGY

## 2024-05-06 PROCEDURE — 92134 CPTRZ OPH DX IMG PST SGM RTA: CPT | Performed by: OPHTHALMOLOGY

## 2024-05-06 PROCEDURE — 92020 GONIOSCOPY: CPT | Performed by: OPHTHALMOLOGY

## 2024-05-06 ASSESSMENT — CONFRONTATIONAL VISUAL FIELD TEST (CVF)
OS_FINDINGS: FULL
OD_FINDINGS: FULL

## 2024-06-17 ENCOUNTER — OFFICE (OUTPATIENT)
Dept: URBAN - METROPOLITAN AREA CLINIC 102 | Facility: CLINIC | Age: 84
Setting detail: OPHTHALMOLOGY
End: 2024-06-17
Payer: MEDICARE

## 2024-06-17 DIAGNOSIS — H40.1121: ICD-10-CM

## 2024-06-17 DIAGNOSIS — H40.1112: ICD-10-CM

## 2024-06-17 PROBLEM — H47.323 DRUSEN OF OPTIC DISC; BOTH EYES: Status: ACTIVE | Noted: 2024-06-17

## 2024-06-17 PROCEDURE — 99213 OFFICE O/P EST LOW 20 MIN: CPT | Performed by: OPHTHALMOLOGY

## 2024-06-17 ASSESSMENT — CONFRONTATIONAL VISUAL FIELD TEST (CVF)
OD_FINDINGS: FULL
OS_FINDINGS: FULL

## 2024-06-19 PROBLEM — E11.9 DIABETES TYPE 2 NO RETINOPATHY: Status: ACTIVE | Noted: 2024-06-19

## 2024-06-19 PROBLEM — Z79.891 LONG TERM USE OF OTHER MEDICATIONS ; BOTH EYES: Status: ACTIVE | Noted: 2024-06-19

## 2024-06-23 DIAGNOSIS — R07.89 OTHER CHEST PAIN: ICD-10-CM

## 2024-06-23 DIAGNOSIS — R03.0 ELEVATED BLOOD-PRESSURE READING, W/OUT DIAGNOSIS OF HYPERTENSION: ICD-10-CM

## 2024-06-23 DIAGNOSIS — T14.8XXA OTHER INJURY OF UNSPECIFIED BODY REGION, INITIAL ENCOUNTER: ICD-10-CM

## 2024-06-23 DIAGNOSIS — L03.811 CELLULITIS OF HEAD [ANY PART, EXCEPT FACE]: ICD-10-CM

## 2024-06-23 DIAGNOSIS — U07.1 COVID-19: ICD-10-CM

## 2024-07-01 ENCOUNTER — OUTPATIENT (OUTPATIENT)
Dept: OUTPATIENT SERVICES | Facility: HOSPITAL | Age: 84
LOS: 1 days | Discharge: HOME | End: 2024-07-01

## 2024-07-01 DIAGNOSIS — D75.839 THROMBOCYTOSIS, UNSPECIFIED: ICD-10-CM

## 2024-07-01 DIAGNOSIS — Z41.9 ENCOUNTER FOR PROCEDURE FOR PURPOSES OTHER THAN REMEDYING HEALTH STATE, UNSPECIFIED: Chronic | ICD-10-CM

## 2024-07-01 DIAGNOSIS — Z98.890 OTHER SPECIFIED POSTPROCEDURAL STATES: Chronic | ICD-10-CM

## 2024-07-01 DIAGNOSIS — Z90.89 ACQUIRED ABSENCE OF OTHER ORGANS: Chronic | ICD-10-CM

## 2024-07-02 ENCOUNTER — RESULT REVIEW (OUTPATIENT)
Age: 84
End: 2024-07-02

## 2024-07-02 ENCOUNTER — APPOINTMENT (OUTPATIENT)
Dept: HEMATOLOGY ONCOLOGY | Facility: CLINIC | Age: 84
End: 2024-07-02
Payer: MEDICARE

## 2024-07-02 VITALS
DIASTOLIC BLOOD PRESSURE: 65 MMHG | HEART RATE: 62 BPM | SYSTOLIC BLOOD PRESSURE: 104 MMHG | TEMPERATURE: 97.8 F | BODY MASS INDEX: 22.43 KG/M2 | OXYGEN SATURATION: 97 % | WEIGHT: 148 LBS | HEIGHT: 68 IN

## 2024-07-02 DIAGNOSIS — Z79.64 ENCOUNTER FOR THERAPEUTIC DRUG LVL MONITORING: ICD-10-CM

## 2024-07-02 DIAGNOSIS — Z51.81 ENCOUNTER FOR THERAPEUTIC DRUG LVL MONITORING: ICD-10-CM

## 2024-07-02 DIAGNOSIS — D47.3 ESSENTIAL (HEMORRHAGIC) THROMBOCYTHEMIA: ICD-10-CM

## 2024-07-02 LAB
BASOPHILS # BLD AUTO: 0.05 K/UL — SIGNIFICANT CHANGE UP (ref 0–0.2)
BASOPHILS NFR BLD AUTO: 0.8 % — SIGNIFICANT CHANGE UP (ref 0–2)
EOSINOPHIL # BLD AUTO: 0.14 K/UL — SIGNIFICANT CHANGE UP (ref 0–0.5)
EOSINOPHIL NFR BLD AUTO: 2.3 % — SIGNIFICANT CHANGE UP (ref 0–6)
HCT VFR BLD CALC: 36.4 % — SIGNIFICANT CHANGE UP (ref 34.5–45)
HGB BLD-MCNC: 12.1 G/DL — SIGNIFICANT CHANGE UP (ref 11.5–15.5)
IMM GRANULOCYTES NFR BLD AUTO: 0.2 % — SIGNIFICANT CHANGE UP (ref 0–0.9)
LYMPHOCYTES # BLD AUTO: 0.97 K/UL — LOW (ref 1–3.3)
LYMPHOCYTES # BLD AUTO: 15.9 % — SIGNIFICANT CHANGE UP (ref 13–44)
MCHC RBC-ENTMCNC: 32.4 PG — SIGNIFICANT CHANGE UP (ref 27–34)
MCHC RBC-ENTMCNC: 33.2 GM/DL — SIGNIFICANT CHANGE UP (ref 32–36)
MCV RBC AUTO: 97.3 FL — SIGNIFICANT CHANGE UP (ref 80–100)
MONOCYTES # BLD AUTO: 0.4 K/UL — SIGNIFICANT CHANGE UP (ref 0–0.9)
MONOCYTES NFR BLD AUTO: 6.6 % — SIGNIFICANT CHANGE UP (ref 2–14)
NEUTROPHILS # BLD AUTO: 4.52 K/UL — SIGNIFICANT CHANGE UP (ref 1.8–7.4)
NEUTROPHILS NFR BLD AUTO: 74.2 % — SIGNIFICANT CHANGE UP (ref 43–77)
NRBC # BLD: 0 /100 WBCS — SIGNIFICANT CHANGE UP (ref 0–0)
NRBC BLD-RTO: 0 /100 WBCS — SIGNIFICANT CHANGE UP (ref 0–0)
PLATELET # BLD AUTO: 279 K/UL — SIGNIFICANT CHANGE UP (ref 150–400)
RBC # BLD: 3.74 M/UL — LOW (ref 3.8–5.2)
RBC # FLD: 14.3 % — SIGNIFICANT CHANGE UP (ref 10.3–14.5)
WBC # BLD: 6.09 K/UL — SIGNIFICANT CHANGE UP (ref 3.8–10.5)
WBC # FLD AUTO: 6.09 K/UL — SIGNIFICANT CHANGE UP (ref 3.8–10.5)

## 2024-07-02 PROCEDURE — 99213 OFFICE O/P EST LOW 20 MIN: CPT

## 2024-07-03 ENCOUNTER — APPOINTMENT (OUTPATIENT)
Dept: OTOLARYNGOLOGY | Facility: CLINIC | Age: 84
End: 2024-07-03
Payer: MEDICARE

## 2024-07-03 VITALS — WEIGHT: 148 LBS | BODY MASS INDEX: 22.43 KG/M2 | HEIGHT: 68 IN

## 2024-07-03 DIAGNOSIS — H90.3 SENSORINEURAL HEARING LOSS, BILATERAL: ICD-10-CM

## 2024-07-03 DIAGNOSIS — H61.23 IMPACTED CERUMEN, BILATERAL: ICD-10-CM

## 2024-07-03 PROCEDURE — 99213 OFFICE O/P EST LOW 20 MIN: CPT

## 2024-07-19 ENCOUNTER — APPOINTMENT (OUTPATIENT)
Dept: ELECTROPHYSIOLOGY | Facility: CLINIC | Age: 84
End: 2024-07-19

## 2024-07-19 VITALS
HEART RATE: 51 BPM | RESPIRATION RATE: 16 BRPM | WEIGHT: 149 LBS | SYSTOLIC BLOOD PRESSURE: 140 MMHG | HEIGHT: 68 IN | BODY MASS INDEX: 22.58 KG/M2 | DIASTOLIC BLOOD PRESSURE: 70 MMHG | OXYGEN SATURATION: 100 %

## 2024-07-19 PROCEDURE — 93000 ELECTROCARDIOGRAM COMPLETE: CPT

## 2024-07-19 PROCEDURE — 99214 OFFICE O/P EST MOD 30 MIN: CPT

## 2024-07-19 PROCEDURE — G2211 COMPLEX E/M VISIT ADD ON: CPT

## 2024-08-02 ENCOUNTER — APPOINTMENT (OUTPATIENT)
Dept: INTERNAL MEDICINE | Facility: CLINIC | Age: 84
End: 2024-08-02
Payer: MEDICARE

## 2024-08-02 VITALS
BODY MASS INDEX: 22.13 KG/M2 | SYSTOLIC BLOOD PRESSURE: 132 MMHG | HEIGHT: 68 IN | HEART RATE: 51 BPM | TEMPERATURE: 97.8 F | WEIGHT: 146 LBS | DIASTOLIC BLOOD PRESSURE: 72 MMHG | OXYGEN SATURATION: 98 %

## 2024-08-02 DIAGNOSIS — R03.0 ELEVATED BLOOD-PRESSURE READING, W/OUT DIAGNOSIS OF HYPERTENSION: ICD-10-CM

## 2024-08-02 DIAGNOSIS — D47.3 ESSENTIAL (HEMORRHAGIC) THROMBOCYTHEMIA: ICD-10-CM

## 2024-08-02 PROCEDURE — G2211 COMPLEX E/M VISIT ADD ON: CPT

## 2024-08-02 PROCEDURE — 99214 OFFICE O/P EST MOD 30 MIN: CPT

## 2024-08-02 NOTE — HISTORY OF PRESENT ILLNESS
[FreeTextEntry1] :  Follow Up Visit [de-identified] : ANA DURAN is a 83 year F who comes in for a follow up visit. Patient with CAD status post LAD stent, hypertension, prediabetes, atrial flutter s/p cadioversion 12/23 now in NSR.   Pt recently with PASCAL when climibing steps and saw EP and advised no further testing.  PT also seen by hematology an dx with essential thrombocythemia and started on hydrea in 8/23 and repeat platelets are stable. Blood work done this week showed BMP within normal range now. Patient denies any cp, sob, abdominal pain, nausea, vomiting, palpitations, fever, chills, constipation, diarrhea.

## 2024-08-02 NOTE — ASSESSMENT
[FreeTextEntry1] : 1.dyspnea on exertion: With history of CAD status post LAD stent and atrial flutter.  Advised could be due to deconditioning as she has had recent weight gain.  However due to recent cardiac history advised following up with cardiology to see if any further testing is needed.  2.atrial flutter: Status post cardioversion, now in normal sinus rhythm, off digoxin will continue on metoprolol 25 mg daily and Xarelto 10 mg daily and follow-up with the EP and cardiology.  3.CAD: Status post LAD stent, continue on Plavix 75 mg daily and follow-up with cardiology, has almost been 1 year since her stent.  4. prediabetes: Recent hemoglobin A1c mildly elevated, discussed regarding dietary changes to make.. Pt advised to keep diabetic diet, decrease carbs and increase dietary protein intake.Exercise as tolerated 3-4 times a week.  5.hyperlipidemia: Mild elevation in total cholesterol and LDL, discussed dietary changes to make.  Continue on Lipitor 20 mg daily. Patient advised on low cholesterol diet-decrease in white carbs and exercise 150 minutes per week.

## 2024-08-05 ENCOUNTER — APPOINTMENT (OUTPATIENT)
Dept: CARDIOLOGY | Facility: CLINIC | Age: 84
End: 2024-08-05

## 2024-08-05 ENCOUNTER — NON-APPOINTMENT (OUTPATIENT)
Age: 84
End: 2024-08-05

## 2024-08-05 PROCEDURE — G2211 COMPLEX E/M VISIT ADD ON: CPT

## 2024-08-05 PROCEDURE — 99214 OFFICE O/P EST MOD 30 MIN: CPT

## 2024-08-05 PROCEDURE — 93000 ELECTROCARDIOGRAM COMPLETE: CPT

## 2024-08-05 PROCEDURE — 93306 TTE W/DOPPLER COMPLETE: CPT

## 2024-08-05 NOTE — DISCUSSION/SUMMARY
[EKG obtained to assist in diagnosis and management of assessed problem(s)] : EKG obtained to assist in diagnosis and management of assessed problem(s) [FreeTextEntry1] : Here today with CC of PASCAL only when walking up a flight of stairs alleviated upon rest w/o associated cardiac symptomology, not reminiscent to prior stent, states she walks around the school yard everyday ( flat surface ) with no symptoms, her PASCAL is only with an incline and stairs, of note she has also had a recent 10 weight gain, she also feels the heat and humidity may be a contributing factor CBC is stable no evidence of Anemia h/o AF with no c/o palpitations, EKG today sinus bradycardia would obtain telemetry to r/o Tachy/Simeon arrythmia with activity, Severe tricuspid regurgitation Moderate pHTN PASP 42mmHG will repeat Echo/recommend Pulmonary consult evaluate SOB/Needs sleep study/Eval pHTN denies recent travel no evidence for a PE, O2 sat RA stable and is maintained on oral AC  Bradycardia/HTN: Will lower beta Blocker and take HS, begin low dose Norvasc 2.5 MG QD, advised sleep study as above, TSH NL, weight reduction, low sodium diet  proceed with Echo and Nuclear stress test  OV 1 week Plan DW patient

## 2024-08-05 NOTE — HISTORY OF PRESENT ILLNESS
[FreeTextEntry1] : 82 yo female PMH: CAD S/P LAD stent, HTN, Pre- DM, essential thrombocythemia followed with hematology (recent, CBC reviewed H&H/Platelet stable), atrial flutter s/p cardioversion 12/23 followed with EP here today with CC of occasional fatigue/PASCAL primarily when climbing stairs alleviated wit rest without associated Chest pain/pressure/heaviness/palpitations, states is not reminiscent to prior intervention  Denies recent travel/Immobility   recent labs reviewed

## 2024-08-05 NOTE — CARDIOLOGY SUMMARY
[de-identified] : 6/29/23:  1. The left ventricular systolic function is normal with an ejection fraction visually estimated at 60 to 65 %. 2. The left atrium is mildly dilated. 3. Mild mitral regurgitation. 4. Trace aortic regurgitation. 5. Severe tricuspid regurgitation. 6. Moderate pulmonary hypertension. 7. Mild pulmonic regurgitation. [de-identified] : 8/23/23 (positive stress test) successful NELSON LAD, Proximal CX 30% stenosis, EF 55%

## 2024-08-05 NOTE — HISTORY OF PRESENT ILLNESS
[FreeTextEntry1] : 84 yo female PMH: CAD S/P LAD stent, HTN, Pre- DM, essential thrombocythemia followed with hematology (recent, CBC reviewed H&H/Platelet stable), atrial flutter s/p cardioversion 12/23 followed with EP here today with CC of occasional fatigue/PASCAL primarily when climbing stairs alleviated wit rest without associated Chest pain/pressure/heaviness/palpitations, states is not reminiscent to prior intervention  Denies recent travel/Immobility   recent labs reviewed

## 2024-08-05 NOTE — REASON FOR VISIT
[Arrhythmia/ECG Abnorrmalities] : arrhythmia/ECG abnormalities [Coronary Artery Disease] : coronary artery disease [Other: ____] : [unfilled]

## 2024-08-05 NOTE — CARDIOLOGY SUMMARY
[de-identified] : 6/29/23:  1. The left ventricular systolic function is normal with an ejection fraction visually estimated at 60 to 65 %. 2. The left atrium is mildly dilated. 3. Mild mitral regurgitation. 4. Trace aortic regurgitation. 5. Severe tricuspid regurgitation. 6. Moderate pulmonary hypertension. 7. Mild pulmonic regurgitation. [de-identified] : 8/23/23 (positive stress test) successful NELSON LAD, Proximal CX 30% stenosis, EF 55%

## 2024-08-06 ENCOUNTER — OUTPATIENT (OUTPATIENT)
Dept: OUTPATIENT SERVICES | Facility: HOSPITAL | Age: 84
LOS: 1 days | End: 2024-08-06
Payer: MEDICARE

## 2024-08-06 ENCOUNTER — APPOINTMENT (OUTPATIENT)
Dept: PULMONOLOGY | Facility: CLINIC | Age: 84
End: 2024-08-06

## 2024-08-06 ENCOUNTER — APPOINTMENT (OUTPATIENT)
Dept: RADIOLOGY | Facility: CLINIC | Age: 84
End: 2024-08-06

## 2024-08-06 ENCOUNTER — APPOINTMENT (OUTPATIENT)
Dept: INTERNAL MEDICINE | Facility: CLINIC | Age: 84
End: 2024-08-06

## 2024-08-06 DIAGNOSIS — Z41.9 ENCOUNTER FOR PROCEDURE FOR PURPOSES OTHER THAN REMEDYING HEALTH STATE, UNSPECIFIED: Chronic | ICD-10-CM

## 2024-08-06 DIAGNOSIS — Z98.890 OTHER SPECIFIED POSTPROCEDURAL STATES: Chronic | ICD-10-CM

## 2024-08-06 DIAGNOSIS — I48.92 UNSPECIFIED ATRIAL FLUTTER: ICD-10-CM

## 2024-08-06 DIAGNOSIS — Z90.89 ACQUIRED ABSENCE OF OTHER ORGANS: Chronic | ICD-10-CM

## 2024-08-06 PROBLEM — R91.1 PULMONARY LESION: Status: ACTIVE | Noted: 2024-08-06

## 2024-08-06 PROBLEM — R91.8 PULMONARY INFILTRATE: Status: ACTIVE | Noted: 2024-08-06

## 2024-08-06 PROBLEM — R06.02 SOBOE (SHORTNESS OF BREATH ON EXERTION): Status: ACTIVE | Noted: 2024-08-05

## 2024-08-06 PROBLEM — I27.20 PULMONARY HYPERTENSION: Status: ACTIVE | Noted: 2024-08-06

## 2024-08-06 PROBLEM — J90 PLEURAL EFFUSION: Status: ACTIVE | Noted: 2024-08-06

## 2024-08-06 PROBLEM — R06.02 SHORTNESS OF BREATH: Status: ACTIVE | Noted: 2024-08-06

## 2024-08-06 PROCEDURE — 99214 OFFICE O/P EST MOD 30 MIN: CPT | Mod: 25

## 2024-08-06 PROCEDURE — 94010 BREATHING CAPACITY TEST: CPT

## 2024-08-06 PROCEDURE — 71046 X-RAY EXAM CHEST 2 VIEWS: CPT | Mod: 26

## 2024-08-06 PROCEDURE — 94727 GAS DIL/WSHOT DETER LNG VOL: CPT

## 2024-08-06 PROCEDURE — ZZZZZ: CPT

## 2024-08-06 PROCEDURE — 94729 DIFFUSING CAPACITY: CPT

## 2024-08-06 PROCEDURE — 71046 X-RAY EXAM CHEST 2 VIEWS: CPT

## 2024-08-06 NOTE — REASON FOR VISIT
[Initial] : an initial visit [Shortness of Breath] : shortness of breath [TextBox_44] : Mostly shortness of breath

## 2024-08-06 NOTE — ASSESSMENT
[FreeTextEntry1] : 83 years old female who looks younger than stated age, has history of coronary artery disease with a stent, chronic atrial fibrillation currently on anticoagulation, thrombocytosis with JAK2 V617F positive ( on Hydrea ), hypertension prediabetic came to establish care. Previously seen by Dr. Nolasco Patient denies prior history of lung disease like asthma, COPD interstitial lung disease but was hospitalized for pneumonia in 2023. She reports her exertional dyspnea lives on the third floor. She denies PND or resting shortness of breath. She is not on inhaled medications no recent use of oxygen. I have reviewed her previous records. The previous CT scan of the chest, August and September 2023 shows progression of pleural effusion. A follow-up chest x-ray in December has shown some improvement in pleural effusion. She also had tree-in-bud pattern on previous CT scan. Recent echocardiography shows worsening aortic valve disease and severe pulmonary hypertension.  Pulmonary function test were reviewed with patient. Mild reduction in DLCO normal total lung capacity mild airflow limitation without bronchodilator response.   Shortness of breath is most likely secondary to severe pulmonary hypertension secondary to chronic heart disease. Myeloproliferative disorders are associated with pulmonary hypertension Bilateral pleural effusions Atelectasis Probable nocturnal hypoxemia secondary to above  Plan: Chest x-ray. Follow-up CT scan of the chest in 2 to 3 months to follow tree-in-bud pattern/pneumonitis. Nocturnal oxygen test Continue follow-up with cardiology No need of inhaled medications at this time Return in 2 to 3 months or earlier if symptomatic Continue deep breathing exercise and incentive spirometry to improve atelectasis

## 2024-08-06 NOTE — QUALITY MEASURES
[Spirometry documented and] : spirometr documented and reviewed in record [Patient has reason to be excluded] : patient has reason to be excluded from screening for tobacco use (eg: limited life expectancy, other medical reason)

## 2024-08-06 NOTE — DISCUSSION/SUMMARY
[FreeTextEntry1] : Pulmonary function test results discussed with patient. Mild reduction in DLCO. Normal total lung capacity. Mild obstruction without significant bronchodilator response

## 2024-08-06 NOTE — HISTORY OF PRESENT ILLNESS
[Never] : never [TextBox_4] : 83 years old female who looks younger than stated age, has history of coronary artery disease with a stent, chronic atrial fibrillation currently on anticoagulation, thrombocytosis with JAK2 V617F positive ( on Hydrea ), hypertension prediabetic came to establish care. Previously seen by Dr. Nolasco Patient denies prior history of lung disease like asthma, COPD interstitial lung disease but was hospitalized for pneumonia in 2023. She reports her exertional dyspnea lives on the third floor. She denies PND or resting shortness of breath. She is not on inhaled medications no recent use of oxygen. No associated cough fever chills night sweats or unusual weight loss. Patient also reports backache.  Patient retired few months back. 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 Recently saw cardiology and her beta-blocker dose was decreased because of bradycardia. Echocardiography shows worsening aortic valve disease and worsening pulmonary hypertension. Patient denies history of pulmonary embolism. She denies history of MARCELL. She denies history of rheumatoid disease         82 yo female PMH: CAD S/P LAD stent, HTN, Pre- DM, essential thrombocythemia followed with hematology (recent, CBC reviewed H&H/Platelet stable), atrial flutter s/p cardioversion 12/23 followed with EP here today with CC of occasional fatigue/PASCAL primarily when climbing stairs alleviated wit rest without associated Chest pain/pressure/heaviness/palpitations, states is not reminiscent to prior intervention Denies recent travel/Immobility  [TextBox_9] : Cardiac disease

## 2024-08-06 NOTE — REVIEW OF SYSTEMS
[Fatigue] : fatigue [A.M. Dry Mouth] : a.m. dry mouth [SOB on Exertion] : sob on exertion [Nocturia] : nocturia [Arthralgias] : arthralgias [Back Pain] : back pain [Easy Bruising] : easy bruising [Clotting Disorder/ Frequent bleeding] : clotting disorder/ frequent bleeding [Negative] : Endocrine [Fever] : no fever [Recent Wt Gain (___ Lbs)] : ~T no recent weight gain [Chills] : no chills [Poor Appetite] : no poor appetite [Recent Wt Loss (___ Lbs)] : ~T no recent weight loss [Cough] : no cough [Hemoptysis] : no hemoptysis [Chest Tightness] : no chest tightness [Frequent URIs] : no frequent URIs [Sputum] : no sputum [Dyspnea] : no dyspnea [Pleuritic Pain] : no pleuritic pain [Wheezing] : no wheezing [Chest Discomfort] : no chest discomfort [Claudication] : no claudication [Edema] : no edema [Leg Cramps] : no leg cramps [Orthopnea] : no orthopnea [Palpitations] : no palpitations [PND] : no PND [Syncope] : no syncope [Dysuria] : no dysuria [Frequency] : no frequency [Myalgias] : no myalgias [Fracture] : no fracture [Trauma/ Injury] : no trauma/ injury [Chronic Pain] : no chronic pain [TextBox_113] : Thrombocytosis [TextBox_144] : Prediabetic

## 2024-08-06 NOTE — RESULTS/DATA
[TextEntry] : 1. Left ventricular cavity is normal in size. Left ventricular wall thickness is mildly increased. Left ventricular systolic function is normal with an ejection fraction visually estimated at 60 to 65 %. 2. Mild left ventricular hypertrophy. 3. Left atrium is moderately dilated. 4. The right atrium is mildly dilated. 5. Mild to moderate mitral regurgitation. 6. Moderate to severe tricuspid regurgitation. 7. Estimated pulmonary artery systolic pressure is 59 mmHg, consistent with moderate-to-severe pulmonary hypertension. 8. Mild aortic regurgitation. -------------------------------------------- CT chest sept 2023  LUNGS AND AIRWAYS: Patent central airways.  Minimal biapical scarring. Calcified granulomas. Minimal tree-in-bud opacities bilaterally, left greater than right. PLEURA: Small bilateral pleural effusions, increased. Adjacent atelectasis. MEDIASTINUM AND MIHCAEL: No lymphadenopathy. VESSELS: Coronary and thoracic and aortic calcifications. HEART: Cardiomegaly. No pericardial effusion. CHEST WALL AND LOWER NECK: Within normal limits. VISUALIZED UPPER ABDOMEN: Tiny left hepatic cyst. Cortical scarring in the left upper pole with a simple cyst and 2 mm nonobstructing stone. Small hiatal hernia. BONES: Degenerative changes.  IMPRESSION: Interval increase in small bilateral pleural effusions. Tree-in-bud opacities bilaterally may represent infectious small airway disease.   --- End of Report ---   9. Mild pulmonic regurgitation. 10. The inferior vena cava is normal in size measuring 1.86 cm in diameter, (normal <2.1cm) with normal inspiratory collapse (normal >50%) consistent with normal right atrial pressure ( R 3, range 0-5mmHg). 11. Trileaflet aortic valve with normal systolic excursion. There is calcification of the aortic valve leaflets. There is mild thickening of the aortic valve leaflets. 12. Normal left ventricular diastolic function. 13. Normal right ventricular cavity size and normal right ventricular systolic function. 14. Compared to the transthoracic echocardiogram performed on 6/29/2023, worsened AI , MR , pulmonary hypertension.

## 2024-08-12 ENCOUNTER — APPOINTMENT (OUTPATIENT)
Dept: CARDIOLOGY | Facility: CLINIC | Age: 84
End: 2024-08-12
Payer: MEDICARE

## 2024-08-12 VITALS
HEIGHT: 68 IN | HEART RATE: 60 BPM | BODY MASS INDEX: 22.43 KG/M2 | OXYGEN SATURATION: 97 % | DIASTOLIC BLOOD PRESSURE: 74 MMHG | WEIGHT: 148 LBS | SYSTOLIC BLOOD PRESSURE: 144 MMHG

## 2024-08-12 DIAGNOSIS — I10 ESSENTIAL (PRIMARY) HYPERTENSION: ICD-10-CM

## 2024-08-12 DIAGNOSIS — I25.10 ATHEROSCLEROTIC HEART DISEASE OF NATIVE CORONARY ARTERY W/OUT ANGINA PECTORIS: ICD-10-CM

## 2024-08-12 DIAGNOSIS — I48.92 UNSPECIFIED ATRIAL FLUTTER: ICD-10-CM

## 2024-08-12 DIAGNOSIS — I27.20 PULMONARY HYPERTENSION, UNSPECIFIED: ICD-10-CM

## 2024-08-12 DIAGNOSIS — R06.02 SHORTNESS OF BREATH: ICD-10-CM

## 2024-08-12 PROCEDURE — 99214 OFFICE O/P EST MOD 30 MIN: CPT

## 2024-08-12 PROCEDURE — G2211 COMPLEX E/M VISIT ADD ON: CPT

## 2024-08-12 NOTE — DISCUSSION/SUMMARY
[FreeTextEntry1] : Here today in follow up of SOB/HTN and Bradycardia all of which all symptoms have improved with medication changes of lowering BB and prescribing Norvasc for BP control Pressure and HR have improved  Echo reviewed, Stress test pending   CXR ( from Pulmonary) No acute radiographic findings and no change  PASCAL Improved and as previously documented only occurred when walking up a flight of stairs alleviated upon rest w/o associated cardiac symptomology, not reminiscent to prior stent, states she walks around the school yard everyday ( flat surface ) with no symptoms, her PASCAL is only with an incline and stairs, of note she has also had a recent 10 weight gain ( not an acute weight gain) CBC is stable no evidence of Anemia  AF: Telemetry obtained results pending ( r/o Tachy/Simeon arrythmia )   Echo reviewed: NL LV SYS FX, mild-moderate MR, Moderate to severe TR PASP 59mmHG with moderate- Severe pHTN, will up titrate Norvasc to 5 MG QD maintain close Pulmonary follow up Needs sleep study denies recent travel no evidence for a PE, O2 sat RA stable and is maintained on oral AC   Bradycardia/HTN: Improved as above, advised sleep study as above, TSH NL, weight reduction, low sodium diet   OV 2-3 weeks  Plan DW patient

## 2024-08-12 NOTE — CARDIOLOGY SUMMARY
[de-identified] : 6/29/23:  1. The left ventricular systolic function is normal with an ejection fraction visually estimated at 60 to 65 %. 2. The left atrium is mildly dilated. 3. Mild mitral regurgitation. 4. Trace aortic regurgitation. 5. Severe tricuspid regurgitation. 6. Moderate pulmonary hypertension. 7. Mild pulmonic regurgitation.  8/5/24: 1. Left ventricular cavity is normal in size. Left ventricular wall thickness is mildly increased. Left ventricular systolic function is normal with an ejection fraction visually estimated at 60 to 65 %. 2. Mild left ventricular hypertrophy. 3. Left atrium is moderately dilated. 4. The right atrium is mildly dilated. 5. Mild to moderate mitral regurgitation. 6. Moderate to severe tricuspid regurgitation. 7. Estimated pulmonary artery systolic pressure is 59 mmHg, consistent with moderate-to-severe pulmonary hypertension. 8. Mild aortic regurgitation. 9. Mild pulmonic regurgitation. 10. The inferior vena cava is normal in size measuring 1.86 cm in diameter, (normal <2.1cm) with normal inspiratory collapse (normal >50%) consistent with normal right atrial pressure ( R 3, range 0-5mmHg). 11. Trileaflet aortic valve with normal systolic excursion. There is calcification of the aortic valve leaflets. There is mild thickening of the aortic valve leaflets. 12. Normal left ventricular diastolic function. 13. Normal right ventricular cavity size and normal right ventricular systolic function. 14. Compared to the transthoracic echocardiogram performed on 6/29/2023, worsened AI , MR , pulmonary hypertension. [de-identified] : 8/23/23 (positive stress test) successful NELSON LAD, Proximal CX 30% stenosis, EF 55%

## 2024-08-12 NOTE — HISTORY OF PRESENT ILLNESS
[FreeTextEntry1] : 84 yo female PMH: CAD S/P LAD stent, HTN, Pre- DM, essential thrombocythemia followed with hematology (recent, CBC reviewed H&H/Platelet stable), atrial flutter s/p cardioversion 12/23 followed with EP here today in follow up of Dyspnea which has improved since last visit/medication changes   (She had c/o occasional fatigue/PASCAL primarily when climbing stairs alleviated wit rest without associated Chest pain/pressure/heaviness/palpitations, states is not reminiscent to prior intervention ) Denies recent travel/Immobility,   upon last visit she was bradycardic and Hypertensive I lowered the BB and began as low dose of Norvasc and today states she feels better her symptoms have significantly improved, HR&BP improved  recent labs reviewed

## 2024-08-12 NOTE — CARDIOLOGY SUMMARY
[de-identified] : 6/29/23:  1. The left ventricular systolic function is normal with an ejection fraction visually estimated at 60 to 65 %. 2. The left atrium is mildly dilated. 3. Mild mitral regurgitation. 4. Trace aortic regurgitation. 5. Severe tricuspid regurgitation. 6. Moderate pulmonary hypertension. 7. Mild pulmonic regurgitation.  8/5/24: 1. Left ventricular cavity is normal in size. Left ventricular wall thickness is mildly increased. Left ventricular systolic function is normal with an ejection fraction visually estimated at 60 to 65 %. 2. Mild left ventricular hypertrophy. 3. Left atrium is moderately dilated. 4. The right atrium is mildly dilated. 5. Mild to moderate mitral regurgitation. 6. Moderate to severe tricuspid regurgitation. 7. Estimated pulmonary artery systolic pressure is 59 mmHg, consistent with moderate-to-severe pulmonary hypertension. 8. Mild aortic regurgitation. 9. Mild pulmonic regurgitation. 10. The inferior vena cava is normal in size measuring 1.86 cm in diameter, (normal <2.1cm) with normal inspiratory collapse (normal >50%) consistent with normal right atrial pressure ( R 3, range 0-5mmHg). 11. Trileaflet aortic valve with normal systolic excursion. There is calcification of the aortic valve leaflets. There is mild thickening of the aortic valve leaflets. 12. Normal left ventricular diastolic function. 13. Normal right ventricular cavity size and normal right ventricular systolic function. 14. Compared to the transthoracic echocardiogram performed on 6/29/2023, worsened AI , MR , pulmonary hypertension. [de-identified] : 8/23/23 (positive stress test) successful NELSON LAD, Proximal CX 30% stenosis, EF 55%

## 2024-08-12 NOTE — HISTORY OF PRESENT ILLNESS
[FreeTextEntry1] : 82 yo female PMH: CAD S/P LAD stent, HTN, Pre- DM, essential thrombocythemia followed with hematology (recent, CBC reviewed H&H/Platelet stable), atrial flutter s/p cardioversion 12/23 followed with EP here today in follow up of Dyspnea which has improved since last visit/medication changes   (She had c/o occasional fatigue/PASCAL primarily when climbing stairs alleviated wit rest without associated Chest pain/pressure/heaviness/palpitations, states is not reminiscent to prior intervention ) Denies recent travel/Immobility,   upon last visit she was bradycardic and Hypertensive I lowered the BB and began as low dose of Norvasc and today states she feels better her symptoms have significantly improved, HR&BP improved  recent labs reviewed

## 2024-08-31 ENCOUNTER — NON-APPOINTMENT (OUTPATIENT)
Age: 84
End: 2024-08-31

## 2024-08-31 DIAGNOSIS — I51.89 OTHER ILL-DEFINED HEART DISEASES: ICD-10-CM

## 2024-08-31 DIAGNOSIS — I47.29 OTHER VENTRICULAR TACHYCARDIA: ICD-10-CM

## 2024-09-03 ENCOUNTER — NON-APPOINTMENT (OUTPATIENT)
Age: 84
End: 2024-09-03

## 2024-09-04 ENCOUNTER — RX RENEWAL (OUTPATIENT)
Age: 84
End: 2024-09-04

## 2024-09-05 ENCOUNTER — APPOINTMENT (OUTPATIENT)
Dept: CARDIOLOGY | Facility: CLINIC | Age: 84
End: 2024-09-05
Payer: MEDICARE

## 2024-09-05 PROCEDURE — A9500: CPT

## 2024-09-05 PROCEDURE — 78452 HT MUSCLE IMAGE SPECT MULT: CPT

## 2024-09-05 PROCEDURE — 93015 CV STRESS TEST SUPVJ I&R: CPT

## 2024-09-10 ENCOUNTER — APPOINTMENT (OUTPATIENT)
Dept: CARDIOLOGY | Facility: CLINIC | Age: 84
End: 2024-09-10
Payer: MEDICARE

## 2024-09-10 ENCOUNTER — APPOINTMENT (OUTPATIENT)
Dept: PULMONOLOGY | Facility: CLINIC | Age: 84
End: 2024-09-10
Payer: MEDICARE

## 2024-09-10 ENCOUNTER — NON-APPOINTMENT (OUTPATIENT)
Age: 84
End: 2024-09-10

## 2024-09-10 VITALS
HEART RATE: 66 BPM | DIASTOLIC BLOOD PRESSURE: 80 MMHG | OXYGEN SATURATION: 97 % | TEMPERATURE: 98 F | HEIGHT: 68 IN | SYSTOLIC BLOOD PRESSURE: 130 MMHG | BODY MASS INDEX: 21.98 KG/M2 | WEIGHT: 145 LBS

## 2024-09-10 VITALS
SYSTOLIC BLOOD PRESSURE: 120 MMHG | HEIGHT: 68 IN | DIASTOLIC BLOOD PRESSURE: 74 MMHG | HEART RATE: 73 BPM | OXYGEN SATURATION: 98 % | WEIGHT: 148 LBS | BODY MASS INDEX: 22.43 KG/M2

## 2024-09-10 DIAGNOSIS — R06.02 SHORTNESS OF BREATH: ICD-10-CM

## 2024-09-10 DIAGNOSIS — J90 PLEURAL EFFUSION, NOT ELSEWHERE CLASSIFIED: ICD-10-CM

## 2024-09-10 DIAGNOSIS — K21.9 GASTRO-ESOPHAGEAL REFLUX DISEASE W/OUT ESOPHAGITIS: ICD-10-CM

## 2024-09-10 DIAGNOSIS — R93.89 ABNORMAL FINDINGS ON DIAGNOSTIC IMAGING OF OTHER SPECIFIED BODY STRUCTURES: ICD-10-CM

## 2024-09-10 PROBLEM — R05.9 COUGH: Status: ACTIVE | Noted: 2024-09-10

## 2024-09-10 PROCEDURE — 99214 OFFICE O/P EST MOD 30 MIN: CPT

## 2024-09-10 PROCEDURE — G2211 COMPLEX E/M VISIT ADD ON: CPT

## 2024-09-10 PROCEDURE — 93000 ELECTROCARDIOGRAM COMPLETE: CPT

## 2024-09-10 NOTE — HISTORY OF PRESENT ILLNESS
[FreeTextEntry1] : 82 yo female PMH: CAD S/P LAD stent, HTN, Pre- DM, essential thrombocythemia followed with hematology (recent, CBC reviewed H&H/Platelet stable), atrial flutter s/p cardioversion 12/23 followed with EP here today in follow up of Dyspnea which has improved but still persists   (She had c/o occasional fatigue/PASCAL primarily when climbing stairs alleviated wit rest without associated Chest pain/pressure/heaviness/palpitations, states is not reminiscent to prior intervention ) Denies recent travel/Immobility,   recent labs reviewed

## 2024-09-10 NOTE — ASSESSMENT
[FreeTextEntry1] : 83 years old female , coronary artery disease with a stent, chronic atrial fibrillation currently on anticoagulation, thrombocytosis with JAK2 V617F positive ( on Hydrea ), hypertension prediabetic -returns for follow-up. Previously seen by Dr. Nolasco . Patient denies prior history of lung disease like asthma, COPD interstitial lung disease but was hospitalized for pneumonia in 2023.  Recent pulmonary function test showed mild obstruction. She is not on inhaled bronchodilators.  Today she returns with an acute cough, COVID testing is negative, nocturnal in nature, for more than 2 weeks.  She is not on amiodarone.  No purulent sputum fever and recent chest x-ray is normal I have reviewed her previous records. The previous CT scan of the chest, August and September 2023 shows progression of pleural effusion. She also had tree-in-bud pattern on previous CT scan. Recent echocardiography shows worsening aortic valve disease and severe pulmonary hypertension.  Pulmonary function test were reviewed with patient. Mild reduction in DLCO normal total lung capacity mild airflow limitation without bronchodilator response.    Subacute cough is most likely secondary to gastroesophageal reflux not necessarily chronic bronchitis, .  She has recurrence of atrial fibrillation I would like to avoid use of beta agonist such as bronchodilators till further evaluation. I will treat her with pantoprazole for GERD Shortness of breath is most likely secondary to severe pulmonary hypertension secondary to chronic heart disease. Myeloproliferative disorders are associated with pulmonary hypertension Bilateral pleural effusions Atelectasis Probable nocturnal hypoxemia secondary to above  Plan: Pantoprazole Lifestyle change to improve acid reflux for example head of bed elevation left lateral position nonspicy food Follow-up CT scan of the chest  to follow tree-in-bud pattern/pneumonitis. Nocturnal oxygen test Continue follow-up with cardiology, for management of valvular heart disease that is contributing to her pulmonary hypertension.  No need of inhaled medications at this time Return in 2 months or earlier if symptomatic Continue deep breathing exercise and incentive spirometry to improve atelectasis.

## 2024-09-10 NOTE — HISTORY OF PRESENT ILLNESS
[Never] : never [TextBox_4] : Sister Sugar is having coughing spells usually dry nocturnal in nature for last 2 weeks. COVID testing is negative. No fever purulent sputum or use of antibiotics. CT scan of the chest is pending. No abdominal pain no chest pain no headache. She is not on amiodarone. Remains in atrial fibrillation currently on metoprolol and hydroxyurea

## 2024-09-10 NOTE — DISCUSSION/SUMMARY
[EKG obtained to assist in diagnosis and management of assessed problem(s)] : EKG obtained to assist in diagnosis and management of assessed problem(s) [FreeTextEntry1] : initially seen in follow up of SOB/HTN and Bradycardia, HR/BP stable However, PASCAL persist, upon todays visit she c/o cough, afebrile  As previously documented dyspnea occurs when walking up a flight of stairs alleviated upon rest w/o associated cardiac symptomology, not reminiscent to prior stent, states she walks around the school yard everyday ( flat surface ) with no symptoms CBC is stable no evidence of Anemia stress test Non Ischemic Echo NL LV SYS FX EF 60% Moderate-severe TR, PASP 59 Moderate-severe PHTN likely d/t Chronic heart Disease  AF: Telemetry obtained Telemetry monitor reviewed: Date of service 8/5/24-8/11/24 Predominate underling rhythm was Sinus, brief 5 beat run of NSVT, multiple episodes of SVT, Episode of PAF with lightheadedness/ AF burden of 4% AVG HR 56, Min HR 39 (Nocturnal) advised sleep study, TSH NL, CW low dose Beta Blocker is setting of bradycardia (EKG today SR HR 64 bpm), EP follow up  Valvular heart disease: Mild-moderate MR, Moderate to severe TR PASP 59mmHG with moderate- Severe pHTN, CW  Norvasc to 5 MG QD maintain close Pulmonary follow up  denies recent travel no evidence for a PE, O2 sat RA stable and is maintained on uninterrupted oral AC   Bradycardia/HTN: Improved as above, advised sleep study, TSH NL,  SOB likely multifactorial d/t Tachy arrythmia AF/Pulmonary HTN/Diastolic HF (enlarged LA); she will be seen by Pulmonary today further eval PHTN, will obtain BNP treat with Lasix trail 20 MG QD (baseline CMP reviewed will repeat in 1 week)  advised EP consult for arrythmia management ? Ablation  OV 1 week  Plan DW patient/Dr Case

## 2024-09-10 NOTE — CARDIOLOGY SUMMARY
[de-identified] : Telemetry monitor reviewed: Date of service 8/5/24-8/11/24 Predominate underling rhythm was Sinus, brief 5 beat run of NSVT, multiple episodes of SVT, AF burden of 4% with HR's ranging from  BPM ( BPM) AVG HR 56, Min HR 39 (Nocturnal) advised sleep study, TSH NL Echo 8/5/24 NL LV FX EF 60-65%, Moderate-severe TR, PASP 59 Moderate-severe PHTN likely d/t Chronic heart Disease, she will CW Norvasc with close Pulmonary follow up Cardiac cath 8/23/23 s/p NELSON LAD, proximal CX 30%, would advise Ischemic evaluation with Nuclear stress test in setting of above arrythmias, CW Beta Blocker/oral AC, Labs reviewed will obtain magnesium EP consult for arrythmia Managment (prior ep recs appreciated) [de-identified] : 9/5/24: There is no clear evidence of myocardial ischemia or scar.  The left ventricle is normal in function. [de-identified] : 6/29/23:  1. The left ventricular systolic function is normal with an ejection fraction visually estimated at 60 to 65 %. 2. The left atrium is mildly dilated. 3. Mild mitral regurgitation. 4. Trace aortic regurgitation. 5. Severe tricuspid regurgitation. 6. Moderate pulmonary hypertension. 7. Mild pulmonic regurgitation.  8/5/24: 1. Left ventricular cavity is normal in size. Left ventricular wall thickness is mildly increased. Left ventricular systolic function is normal with an ejection fraction visually estimated at 60 to 65 %. 2. Mild left ventricular hypertrophy. 3. Left atrium is moderately dilated. 4. The right atrium is mildly dilated. 5. Mild to moderate mitral regurgitation. 6. Moderate to severe tricuspid regurgitation. 7. Estimated pulmonary artery systolic pressure is 59 mmHg, consistent with moderate-to-severe pulmonary hypertension. 8. Mild aortic regurgitation. 9. Mild pulmonic regurgitation. 10. The inferior vena cava is normal in size measuring 1.86 cm in diameter, (normal <2.1cm) with normal inspiratory collapse (normal >50%) consistent with normal right atrial pressure ( R 3, range 0-5mmHg). 11. Trileaflet aortic valve with normal systolic excursion. There is calcification of the aortic valve leaflets. There is mild thickening of the aortic valve leaflets. 12. Normal left ventricular diastolic function. 13. Normal right ventricular cavity size and normal right ventricular systolic function. 14. Compared to the transthoracic echocardiogram performed on 6/29/2023, worsened AI , MR , pulmonary hypertension. [de-identified] : 8/23/23 (positive stress test) successful NELSON LAD, Proximal CX 30% stenosis, EF 55%

## 2024-09-10 NOTE — REVIEW OF SYSTEMS
[Fever] : no fever [Fatigue] : fatigue [Recent Wt Gain (___ Lbs)] : ~T no recent weight gain [Chills] : no chills [Poor Appetite] : no poor appetite [Recent Wt Loss (___ Lbs)] : ~T no recent weight loss [Cough] : cough [Hemoptysis] : no hemoptysis [Chest Tightness] : no chest tightness [Frequent URIs] : no frequent URIs [Sputum] : no sputum [Dyspnea] : no dyspnea [Pleuritic Pain] : no pleuritic pain [Wheezing] : no wheezing [A.M. Dry Mouth] : a.m. dry mouth [SOB on Exertion] : sob on exertion [Chest Discomfort] : no chest discomfort [Claudication] : no claudication [Edema] : no edema [Leg Cramps] : no leg cramps [Orthopnea] : no orthopnea [Palpitations] : no palpitations [PND] : no PND [Syncope] : no syncope [Nocturia] : nocturia [Dysuria] : no dysuria [Frequency] : no frequency [Arthralgias] : arthralgias [Myalgias] : no myalgias [Back Pain] : back pain [Fracture] : no fracture [Trauma/ Injury] : no trauma/ injury [Chronic Pain] : no chronic pain [Easy Bruising] : easy bruising [Clotting Disorder/ Frequent bleeding] : clotting disorder/ frequent bleeding [Negative] : Endocrine [TextBox_113] : Thrombocytosis [TextBox_144] : Prediabetic

## 2024-09-10 NOTE — PHYSICAL EXAM
[No Acute Distress] : no acute distress [Well Nourished] : well nourished [Well Developed] : well developed [Normal Oropharynx] : normal oropharynx [II] : Mallampati Class: II [Normal Appearance] : normal appearance [No Neck Mass] : no neck mass [Normal Rate/Rhythm] : normal rate/rhythm [Murmur ___ / 6] : murmur [unfilled] / 6 [No Resp Distress] : no resp distress [Clear to Auscultation Bilaterally] : clear to auscultation bilaterally [No Abnormalities] : no abnormalities [Normal Gait] : normal gait [No Clubbing] : no clubbing [No Cyanosis] : no cyanosis [No Edema] : no edema [FROM] : FROM [Normal Color/ Pigmentation] : normal color/ pigmentation [No Focal Deficits] : no focal deficits [Oriented x3] : oriented x3 [Normal Mood] : normal mood [Normal Affect] : normal affect [Remote Memory Normal] : remote memory normal [TextBox_11] : Dry throat [TextBox_54] : AF

## 2024-09-13 ENCOUNTER — OUTPATIENT (OUTPATIENT)
Dept: OUTPATIENT SERVICES | Facility: HOSPITAL | Age: 84
LOS: 1 days | End: 2024-09-13
Payer: COMMERCIAL

## 2024-09-13 ENCOUNTER — APPOINTMENT (OUTPATIENT)
Dept: CT IMAGING | Facility: CLINIC | Age: 84
End: 2024-09-13

## 2024-09-13 DIAGNOSIS — Z98.890 OTHER SPECIFIED POSTPROCEDURAL STATES: Chronic | ICD-10-CM

## 2024-09-13 DIAGNOSIS — Z41.9 ENCOUNTER FOR PROCEDURE FOR PURPOSES OTHER THAN REMEDYING HEALTH STATE, UNSPECIFIED: Chronic | ICD-10-CM

## 2024-09-13 DIAGNOSIS — R91.8 OTHER NONSPECIFIC ABNORMAL FINDING OF LUNG FIELD: ICD-10-CM

## 2024-09-13 DIAGNOSIS — Z90.89 ACQUIRED ABSENCE OF OTHER ORGANS: Chronic | ICD-10-CM

## 2024-09-13 PROCEDURE — 71250 CT THORAX DX C-: CPT | Mod: 26,MH

## 2024-09-13 PROCEDURE — 71250 CT THORAX DX C-: CPT

## 2024-09-13 RX ORDER — PANTOPRAZOLE 20 MG/1
20 TABLET, DELAYED RELEASE ORAL
Qty: 180 | Refills: 0 | Status: ACTIVE | COMMUNITY
Start: 2024-09-10 | End: 1900-01-01

## 2024-09-17 ENCOUNTER — APPOINTMENT (OUTPATIENT)
Dept: CARDIOLOGY | Facility: CLINIC | Age: 84
End: 2024-09-17
Payer: MEDICARE

## 2024-09-17 ENCOUNTER — NON-APPOINTMENT (OUTPATIENT)
Age: 84
End: 2024-09-17

## 2024-09-17 VITALS
HEIGHT: 68 IN | OXYGEN SATURATION: 99 % | HEART RATE: 58 BPM | BODY MASS INDEX: 22.43 KG/M2 | SYSTOLIC BLOOD PRESSURE: 120 MMHG | WEIGHT: 148 LBS | DIASTOLIC BLOOD PRESSURE: 64 MMHG

## 2024-09-17 DIAGNOSIS — R05.9 COUGH, UNSPECIFIED: ICD-10-CM

## 2024-09-17 DIAGNOSIS — I51.89 OTHER ILL-DEFINED HEART DISEASES: ICD-10-CM

## 2024-09-17 DIAGNOSIS — I48.0 PAROXYSMAL ATRIAL FIBRILLATION: ICD-10-CM

## 2024-09-17 DIAGNOSIS — I47.29 OTHER VENTRICULAR TACHYCARDIA: ICD-10-CM

## 2024-09-17 DIAGNOSIS — I25.10 ATHEROSCLEROTIC HEART DISEASE OF NATIVE CORONARY ARTERY W/OUT ANGINA PECTORIS: ICD-10-CM

## 2024-09-17 PROCEDURE — 93000 ELECTROCARDIOGRAM COMPLETE: CPT

## 2024-09-17 PROCEDURE — 99214 OFFICE O/P EST MOD 30 MIN: CPT

## 2024-09-17 RX ORDER — FUROSEMIDE 40 MG/1
40 TABLET ORAL
Qty: 30 | Refills: 0 | Status: ACTIVE | COMMUNITY
Start: 2024-09-10 | End: 1900-01-01

## 2024-09-17 NOTE — HISTORY OF PRESENT ILLNESS
[FreeTextEntry1] : 84 yo female PMH: CAD S/P LAD stent 8/2023, HTN, Pre- DM, essential thrombocythemia followed with hematology (recent, CBC reviewed H&H/Platelet stable), atrial flutter s/p cardioversion 12/23 followed with EP here today in follow up of Dyspnea and cough, she was seen by Pulmonary and was prescribed pantoprazole which her cough has "slightly improved", from cardiac standpoint she was prescribed course of Lasix which she feels " less congested but still SOB with walking up stairs/incline associated with fatigue without associated Chest pain/pressure/heaviness/palpitations, Denies recent travel/Immobility currently taking oral AC for AF uninterrupted  recent CXR  NL Echo NL LV SYS FX EF 60-65%, EDMUND, Mild-mod MR, Mod-severe TR PASP 59 Mod-severe pHTN, Mild AR Nuclear stress test neg for Ischemia with no clear evidence of myocardial ischemia or scar. Had CT Chest non contrast last week final read pending   recent labs reviewed

## 2024-09-17 NOTE — CARDIOLOGY SUMMARY
[de-identified] : 9/5/24: There is no clear evidence of myocardial ischemia or scar.  The left ventricle is normal in function. [de-identified] : Telemetry monitor reviewed: Date of service 8/5/24-8/11/24 Predominate underling rhythm was Sinus, brief 5 beat run of NSVT, multiple episodes of SVT, AF burden of 4% with HR's ranging from  BPM ( BPM) AVG HR 56, Min HR 39 (Nocturnal) advised sleep study, TSH NL Echo 8/5/24 NL LV FX EF 60-65%, Moderate-severe TR, PASP 59 Moderate-severe PHTN likely d/t Chronic heart Disease, she will CW Norvasc with close Pulmonary follow up Cardiac cath 8/23/23 s/p NELSON LAD, proximal CX 30%, would advise Ischemic evaluation with Nuclear stress test in setting of above arrythmias, CW Beta Blocker/oral AC, Labs reviewed will obtain magnesium EP consult for arrythmia Managment (prior ep recs appreciated) [de-identified] : 6/29/23:  1. The left ventricular systolic function is normal with an ejection fraction visually estimated at 60 to 65 %. 2. The left atrium is mildly dilated. 3. Mild mitral regurgitation. 4. Trace aortic regurgitation. 5. Severe tricuspid regurgitation. 6. Moderate pulmonary hypertension. 7. Mild pulmonic regurgitation.  8/5/24: 1. Left ventricular cavity is normal in size. Left ventricular wall thickness is mildly increased. Left ventricular systolic function is normal with an ejection fraction visually estimated at 60 to 65 %. 2. Mild left ventricular hypertrophy. 3. Left atrium is moderately dilated. 4. The right atrium is mildly dilated. 5. Mild to moderate mitral regurgitation. 6. Moderate to severe tricuspid regurgitation. 7. Estimated pulmonary artery systolic pressure is 59 mmHg, consistent with moderate-to-severe pulmonary hypertension. 8. Mild aortic regurgitation. 9. Mild pulmonic regurgitation. 10. The inferior vena cava is normal in size measuring 1.86 cm in diameter, (normal <2.1cm) with normal inspiratory collapse (normal >50%) consistent with normal right atrial pressure ( R 3, range 0-5mmHg). 11. Trileaflet aortic valve with normal systolic excursion. There is calcification of the aortic valve leaflets. There is mild thickening of the aortic valve leaflets. 12. Normal left ventricular diastolic function. 13. Normal right ventricular cavity size and normal right ventricular systolic function. 14. Compared to the transthoracic echocardiogram performed on 6/29/2023, worsened AI , MR , pulmonary hypertension. [de-identified] : 8/23/23 (positive stress test) successful NELSON LAD, Proximal CX 30% stenosis, EF 55%

## 2024-09-17 NOTE — DISCUSSION/SUMMARY
[EKG obtained to assist in diagnosis and management of assessed problem(s)] : EKG obtained to assist in diagnosis and management of assessed problem(s) [FreeTextEntry1] : Here today in follow up of PASCAL (with incline and stairs) and cough, she was seen by Pulmonary CXR NL, CT Chest final read pending she was prescribed Pantoprazole and states her cough has slightly improved, from a cardiac standpoint SOB likely multifactorial d/t Tachy arrythmia AF/Pulmonary HTN/Diastolic HF (enlarged LA) was given a short course of Lasix 20 MG qd with minimal improvement in congestion but still SOB, stress test non ischemic, Echo NL LV SYS FX EF 60% Moderate-severe TR, PASP 59 Moderate-severe PHTN likely d/t Chronic heart Disease, Dr Case present R&L heart cath advised However, patient reluctant, Labs ordered to include BNP, will up titrate Lasix and follow up in 1 week, will DW EP  Telemetry monitor reviewed: Date of service 8/5/24-8/11/24 Predominate underling rhythm was Sinus, brief 5 beat run of NSVT, multiple episodes of SVT, Episode of PAF with lightheadedness/ AF burden of 4% longest duration 5 hrs 37 min, AVG HR 56, Min HR 39 (Nocturnal) advised sleep study, TSH NL, CW low dose Beta Blocker will not up titrate 2/2 is setting of bradycardia, will DW EP arrythmia management    Valvular heart disease: Mild-moderate MR, Moderate to severe TR PASP 59mmHG with moderate- Severe pHTN, CW  Norvasc to 5 MG QD maintain close Pulmonary follow up  denies recent travel no evidence for a PE, O2 sat RA stable and is maintained on uninterrupted oral AC   Bradycardia/HTN: Improved, advised sleep study, TSH NL,  OV 1 week  Plan DW patient/Dr Case

## 2024-09-17 NOTE — REASON FOR VISIT
[Other: ____] : [unfilled] [Arrhythmia/ECG Abnorrmalities] : arrhythmia/ECG abnormalities [Coronary Artery Disease] : coronary artery disease

## 2024-09-18 LAB
ALBUMIN SERPL ELPH-MCNC: 4.5 G/DL
ALBUMIN SERPL ELPH-MCNC: 4.5 G/DL
ALP BLD-CCNC: 95 U/L
ALP BLD-CCNC: 96 U/L
ALT SERPL-CCNC: 20 U/L
ALT SERPL-CCNC: 21 U/L
ANION GAP SERPL CALC-SCNC: 12 MMOL/L
ANION GAP SERPL CALC-SCNC: 12 MMOL/L
APTT 2H P 1:4 NP PPP: 36.8 SEC
APTT 2H P INC PPP: 37.8 SEC
APTT BLD: 36.7 SEC
APTT IMM NP/PRE NP PPP: 34.9 SEC
APTT INV RATIO PPP: 36.7 SEC
AST SERPL-CCNC: 19 U/L
AST SERPL-CCNC: 20 U/L
BILIRUB SERPL-MCNC: 0.2 MG/DL
BILIRUB SERPL-MCNC: 0.3 MG/DL
BUN SERPL-MCNC: 18 MG/DL
BUN SERPL-MCNC: 19 MG/DL
CALCIUM SERPL-MCNC: 10.1 MG/DL
CALCIUM SERPL-MCNC: 9.8 MG/DL
CHLORIDE SERPL-SCNC: 103 MMOL/L
CHLORIDE SERPL-SCNC: 103 MMOL/L
CO2 SERPL-SCNC: 26 MMOL/L
CO2 SERPL-SCNC: 26 MMOL/L
CREAT SERPL-MCNC: 1.08 MG/DL
CREAT SERPL-MCNC: 1.08 MG/DL
DEPRECATED D DIMER PPP IA-ACNC: <150 NG/ML DDU
EGFR: 51 ML/MIN/1.73M2
EGFR: 51 ML/MIN/1.73M2
GLUCOSE SERPL-MCNC: 76 MG/DL
GLUCOSE SERPL-MCNC: 78 MG/DL
HCT VFR BLD CALC: 38.3 %
HGB BLD-MCNC: 12.3 G/DL
MAGNESIUM SERPL-MCNC: 2.3 MG/DL
MCHC RBC-ENTMCNC: 31.9 PG
MCHC RBC-ENTMCNC: 32.1 GM/DL
MCV RBC AUTO: 99.2 FL
NPP NORMAL POOLED PLASMA: 31.8 SECS
NT-PROBNP SERPL-MCNC: 263 PG/ML
NT-PROBNP SERPL-MCNC: 269 PG/ML
PLATELET # BLD AUTO: 438 K/UL
POTASSIUM SERPL-SCNC: 4.7 MMOL/L
POTASSIUM SERPL-SCNC: 4.8 MMOL/L
PROT SERPL-MCNC: 6.9 G/DL
PROT SERPL-MCNC: 7 G/DL
RBC # BLD: 3.86 M/UL
RBC # FLD: 14.7 %
SODIUM SERPL-SCNC: 140 MMOL/L
SODIUM SERPL-SCNC: 141 MMOL/L
T3 SERPL-MCNC: 87 NG/DL
T4 SERPL-MCNC: 5.6 UG/DL
TSH SERPL-ACNC: 2.21 UIU/ML
WBC # FLD AUTO: 7.57 K/UL

## 2024-09-23 ENCOUNTER — NON-APPOINTMENT (OUTPATIENT)
Age: 84
End: 2024-09-23

## 2024-09-24 ENCOUNTER — APPOINTMENT (OUTPATIENT)
Dept: CARDIOLOGY | Facility: CLINIC | Age: 84
End: 2024-09-24
Payer: MEDICARE

## 2024-09-24 ENCOUNTER — NON-APPOINTMENT (OUTPATIENT)
Age: 84
End: 2024-09-24

## 2024-09-24 VITALS
HEIGHT: 68 IN | SYSTOLIC BLOOD PRESSURE: 124 MMHG | WEIGHT: 151 LBS | BODY MASS INDEX: 22.88 KG/M2 | DIASTOLIC BLOOD PRESSURE: 60 MMHG | HEART RATE: 59 BPM | OXYGEN SATURATION: 97 %

## 2024-09-24 DIAGNOSIS — I48.92 UNSPECIFIED ATRIAL FLUTTER: ICD-10-CM

## 2024-09-24 DIAGNOSIS — I10 ESSENTIAL (PRIMARY) HYPERTENSION: ICD-10-CM

## 2024-09-24 DIAGNOSIS — I27.20 PULMONARY HYPERTENSION, UNSPECIFIED: ICD-10-CM

## 2024-09-24 DIAGNOSIS — R06.02 SHORTNESS OF BREATH: ICD-10-CM

## 2024-09-24 DIAGNOSIS — I65.23 OCCLUSION AND STENOSIS OF BILATERAL CAROTID ARTERIES: ICD-10-CM

## 2024-09-24 DIAGNOSIS — I38 ENDOCARDITIS, VALVE UNSPECIFIED: ICD-10-CM

## 2024-09-24 PROCEDURE — 93000 ELECTROCARDIOGRAM COMPLETE: CPT

## 2024-09-24 PROCEDURE — 99214 OFFICE O/P EST MOD 30 MIN: CPT

## 2024-09-24 PROCEDURE — G2211 COMPLEX E/M VISIT ADD ON: CPT

## 2024-09-24 RX ORDER — AMOXICILLIN AND CLAVULANATE POTASSIUM 500; 125 MG/1; MG/1
500-125 TABLET, FILM COATED ORAL
Qty: 14 | Refills: 0 | Status: ACTIVE | COMMUNITY
Start: 2024-09-24 | End: 1900-01-01

## 2024-09-24 RX ORDER — DOXYCYCLINE HYCLATE 100 MG/1
100 TABLET ORAL
Qty: 12 | Refills: 0 | Status: ACTIVE | COMMUNITY
Start: 2024-09-24 | End: 1900-01-01

## 2024-09-24 NOTE — HISTORY OF PRESENT ILLNESS
[FreeTextEntry1] : 82 yo female PMH: CAD S/P LAD stent 8/2023, HTN, Pre- DM, essential thrombocythemia followed with hematology (recent, CBC reviewed H&H/Platelet stable), atrial flutter s/p cardioversion 12/23 followed with EP here today in follow up of Dyspnea and cough which has been steadily improving reports symptoms have been lessening in severity and since her last visit her CT Scan reveals PNA which is likely the cause of her Dyspnea and cough   Cardiac work up thus far: Echo NL LV SYS FX EF 60-65%, EDMUND, Mild-mod MR, Mod-severe TR PASP 59 Mod-severe pHTN, Mild AR Nuclear stress test neg for Ischemia with no clear evidence of myocardial ischemia or scar. EKG today Sinus rhythm  Denies recent travel/Immobility currently taking oral AC for AF uninterrupted

## 2024-09-24 NOTE — CARDIOLOGY SUMMARY
[de-identified] : Telemetry monitor reviewed: Date of service 8/5/24-8/11/24 Predominate underling rhythm was Sinus, brief 5 beat run of NSVT, multiple episodes of SVT, AF burden of 4% with HR's ranging from  BPM ( BPM) AVG HR 56, Min HR 39 (Nocturnal) advised sleep study, TSH NL Echo 8/5/24 NL LV FX EF 60-65%, Moderate-severe TR, PASP 59 Moderate-severe PHTN likely d/t Chronic heart Disease, she will CW Norvasc with close Pulmonary follow up Cardiac cath 8/23/23 s/p NELSON LAD, proximal CX 30%, would advise Ischemic evaluation with Nuclear stress test in setting of above arrythmias, CW Beta Blocker/oral AC, Labs reviewed will obtain magnesium EP consult for arrythmia Managment (prior ep recs appreciated) [de-identified] : 9/5/24: There is no clear evidence of myocardial ischemia or scar.  The left ventricle is normal in function. [de-identified] : 6/29/23:  1. The left ventricular systolic function is normal with an ejection fraction visually estimated at 60 to 65 %. 2. The left atrium is mildly dilated. 3. Mild mitral regurgitation. 4. Trace aortic regurgitation. 5. Severe tricuspid regurgitation. 6. Moderate pulmonary hypertension. 7. Mild pulmonic regurgitation.  8/5/24: 1. Left ventricular cavity is normal in size. Left ventricular wall thickness is mildly increased. Left ventricular systolic function is normal with an ejection fraction visually estimated at 60 to 65 %. 2. Mild left ventricular hypertrophy. 3. Left atrium is moderately dilated. 4. The right atrium is mildly dilated. 5. Mild to moderate mitral regurgitation. 6. Moderate to severe tricuspid regurgitation. 7. Estimated pulmonary artery systolic pressure is 59 mmHg, consistent with moderate-to-severe pulmonary hypertension. 8. Mild aortic regurgitation. 9. Mild pulmonic regurgitation. 10. The inferior vena cava is normal in size measuring 1.86 cm in diameter, (normal <2.1cm) with normal inspiratory collapse (normal >50%) consistent with normal right atrial pressure ( R 3, range 0-5mmHg). 11. Trileaflet aortic valve with normal systolic excursion. There is calcification of the aortic valve leaflets. There is mild thickening of the aortic valve leaflets. 12. Normal left ventricular diastolic function. 13. Normal right ventricular cavity size and normal right ventricular systolic function. 14. Compared to the transthoracic echocardiogram performed on 6/29/2023, worsened AI , MR , pulmonary hypertension. [de-identified] : 8/23/23 (positive stress test) successful NELSON LAD, Proximal CX 30% stenosis, EF 55%

## 2024-09-24 NOTE — DISCUSSION/SUMMARY
[FreeTextEntry1] : Here today in follow up of PASCAL/Cough which is now most likely 2/2 to the recent diagnosis of PNA. Per patient symptoms are progressively improving, I have spoken to Pulmonary patient will begin ABX therapy as prescribed. Lasix d/c'd (BNP NL appears compensated no clinic evidence of HF) Cardiac testing reviewed (see cardiology summary)   AF: Telemetry monitor reviewed: Date of service 8/5/24-8/11/24 Predominate underling rhythm was Sinus, brief 5 beat run of NSVT, multiple episodes of SVT, Episode of PAF with lightheadedness/ AF burden of 4% longest duration 5 hrs 37 min, AVG HR 56, Min HR 39 (Nocturnal) advised sleep study, TSH NL, CW low dose Beta Blocker will not up titrate 2/2 is setting of bradycardia, will DW EP arrythmia management    Valvular heart disease: Mild-moderate MR, Moderate to severe TR PASP 59mmHG with moderate- Severe pHTN, CW  Norvasc to 5 MG QD maintain close Pulmonary follow up  Bradycardia/HTN: Improved, advised sleep study, TSH NL,  OV 1 month, repeat CT scan 8 weeks per Pulmonary recommendations   plan DW patient   Plan DW patient/Dr Case   [EKG obtained to assist in diagnosis and management of assessed problem(s)] : EKG obtained to assist in diagnosis and management of assessed problem(s)

## 2024-10-03 ENCOUNTER — RX CHANGE (OUTPATIENT)
Age: 84
End: 2024-10-03

## 2024-10-17 ENCOUNTER — RX RENEWAL (OUTPATIENT)
Age: 84
End: 2024-10-17

## 2024-11-12 ENCOUNTER — APPOINTMENT (OUTPATIENT)
Dept: CT IMAGING | Facility: CLINIC | Age: 84
End: 2024-11-12
Payer: MEDICARE

## 2024-11-12 ENCOUNTER — OUTPATIENT (OUTPATIENT)
Dept: OUTPATIENT SERVICES | Facility: HOSPITAL | Age: 84
LOS: 1 days | End: 2024-11-12
Payer: MEDICARE

## 2024-11-12 DIAGNOSIS — Z41.9 ENCOUNTER FOR PROCEDURE FOR PURPOSES OTHER THAN REMEDYING HEALTH STATE, UNSPECIFIED: Chronic | ICD-10-CM

## 2024-11-12 DIAGNOSIS — R05.9 COUGH, UNSPECIFIED: ICD-10-CM

## 2024-11-12 DIAGNOSIS — Z90.89 ACQUIRED ABSENCE OF OTHER ORGANS: Chronic | ICD-10-CM

## 2024-11-12 PROCEDURE — 71250 CT THORAX DX C-: CPT | Mod: 26,MH

## 2024-11-12 PROCEDURE — 71250 CT THORAX DX C-: CPT

## 2024-11-14 NOTE — INPATIENT CERTIFICATION FOR MEDICARE PATIENTS - THE STATUS OF COMORBIDITIES.
2. The status of comorbities. (See ED/admit documents) [FreeTextEntry1] : 1. CPE - routine labs ordered - Vaccinations: per pt UTD Flu, covid19 x2, Tdap 3/24/23 - UTD with papsmear - Depression screen with PHQ2 negative - UTD with routine eye exams. Due for dental Exam  2. Bradycardia - pt states she has noticed a resting HR in the 40's on her apple watch. Improves with activity. 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 Ellis -- TTE showed normal LVEF and mild ND  3. Elevated Lipids - c/w low cholesterol diet and regular exercise - repeat fasting lipid panel ordered  4. 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 Obesity - BMI 31.79 - weight loss counselling provided today - pt on Zepbound compound tirzepatide 5mg -- states was provided by a program with an NP and pharmacy; well tolerated - as pt also has hx of PCOS -- endocrinology referral provided and gyn f/u recommended   6. Acid Reflux - on omeprazole PRN   3-6 month f/u recommended; PRN if sooner.

## 2024-11-18 ENCOUNTER — APPOINTMENT (OUTPATIENT)
Dept: PULMONOLOGY | Facility: CLINIC | Age: 84
End: 2024-11-18
Payer: MEDICARE

## 2024-11-18 VITALS
BODY MASS INDEX: 22.88 KG/M2 | SYSTOLIC BLOOD PRESSURE: 134 MMHG | HEIGHT: 68 IN | DIASTOLIC BLOOD PRESSURE: 64 MMHG | WEIGHT: 151 LBS | OXYGEN SATURATION: 97 % | HEART RATE: 58 BPM | TEMPERATURE: 97.8 F

## 2024-11-18 DIAGNOSIS — R06.02 SHORTNESS OF BREATH: ICD-10-CM

## 2024-11-18 PROCEDURE — 99213 OFFICE O/P EST LOW 20 MIN: CPT

## 2024-11-20 ENCOUNTER — NON-APPOINTMENT (OUTPATIENT)
Age: 84
End: 2024-11-20

## 2024-11-27 ENCOUNTER — OUTPATIENT (OUTPATIENT)
Dept: OUTPATIENT SERVICES | Facility: HOSPITAL | Age: 84
LOS: 1 days | Discharge: HOME | End: 2024-11-27

## 2024-11-27 DIAGNOSIS — L72.11 PILAR CYST: ICD-10-CM

## 2024-11-27 DIAGNOSIS — Z87.448 PERSONAL HISTORY OF OTHER DISEASES OF URINARY SYSTEM: ICD-10-CM

## 2024-11-27 DIAGNOSIS — H69.93 UNSPECIFIED EUSTACHIAN TUBE DISORDER, BILATERAL: ICD-10-CM

## 2024-11-27 DIAGNOSIS — M25.512 PAIN IN LEFT SHOULDER: ICD-10-CM

## 2024-11-27 DIAGNOSIS — Z98.890 OTHER SPECIFIED POSTPROCEDURAL STATES: Chronic | ICD-10-CM

## 2024-11-27 DIAGNOSIS — D75.839 THROMBOCYTOSIS, UNSPECIFIED: ICD-10-CM

## 2024-11-27 DIAGNOSIS — Z86.03 PERSONAL HISTORY OF NEOPLASM OF UNCERTAIN BEHAVIOR: ICD-10-CM

## 2024-11-27 DIAGNOSIS — H61.23 IMPACTED CERUMEN, BILATERAL: ICD-10-CM

## 2024-11-27 DIAGNOSIS — Z90.89 ACQUIRED ABSENCE OF OTHER ORGANS: Chronic | ICD-10-CM

## 2024-11-27 DIAGNOSIS — M75.42 IMPINGEMENT SYNDROME OF LEFT SHOULDER: ICD-10-CM

## 2024-11-27 DIAGNOSIS — Z41.9 ENCOUNTER FOR PROCEDURE FOR PURPOSES OTHER THAN REMEDYING HEALTH STATE, UNSPECIFIED: Chronic | ICD-10-CM

## 2024-11-27 DIAGNOSIS — J06.9 ACUTE UPPER RESPIRATORY INFECTION, UNSPECIFIED: ICD-10-CM

## 2024-11-27 DIAGNOSIS — R06.02 SHORTNESS OF BREATH: ICD-10-CM

## 2024-12-02 ENCOUNTER — RX RENEWAL (OUTPATIENT)
Age: 84
End: 2024-12-02

## 2024-12-02 ENCOUNTER — RESULT REVIEW (OUTPATIENT)
Age: 84
End: 2024-12-02

## 2024-12-02 ENCOUNTER — APPOINTMENT (OUTPATIENT)
Dept: HEMATOLOGY ONCOLOGY | Facility: CLINIC | Age: 84
End: 2024-12-02
Payer: MEDICARE

## 2024-12-02 VITALS
WEIGHT: 150.38 LBS | SYSTOLIC BLOOD PRESSURE: 119 MMHG | HEART RATE: 51 BPM | OXYGEN SATURATION: 95 % | BODY MASS INDEX: 22.79 KG/M2 | HEIGHT: 68 IN | TEMPERATURE: 98.1 F | DIASTOLIC BLOOD PRESSURE: 70 MMHG

## 2024-12-02 DIAGNOSIS — Z51.81 ENCOUNTER FOR THERAPEUTIC DRUG LVL MONITORING: ICD-10-CM

## 2024-12-02 DIAGNOSIS — D47.3 ESSENTIAL (HEMORRHAGIC) THROMBOCYTHEMIA: ICD-10-CM

## 2024-12-02 DIAGNOSIS — Z51.81 ENCOUNTER FOR THERAPEUTIC DRUG LEVEL MONITORING: ICD-10-CM

## 2024-12-02 DIAGNOSIS — Z79.64 ENCOUNTER FOR THERAPEUTIC DRUG LVL MONITORING: ICD-10-CM

## 2024-12-02 LAB
BASOPHILS # BLD AUTO: 0.04 K/UL — SIGNIFICANT CHANGE UP (ref 0–0.2)
BASOPHILS NFR BLD AUTO: 0.6 % — SIGNIFICANT CHANGE UP (ref 0–2)
EOSINOPHIL # BLD AUTO: 0.1 K/UL — SIGNIFICANT CHANGE UP (ref 0–0.5)
EOSINOPHIL NFR BLD AUTO: 1.6 % — SIGNIFICANT CHANGE UP (ref 0–6)
HCT VFR BLD CALC: 36.6 % — SIGNIFICANT CHANGE UP (ref 34.5–45)
HGB BLD-MCNC: 12.3 G/DL — SIGNIFICANT CHANGE UP (ref 11.5–15.5)
IMM GRANULOCYTES NFR BLD AUTO: 0.3 % — SIGNIFICANT CHANGE UP (ref 0–0.9)
LYMPHOCYTES # BLD AUTO: 1.08 K/UL — SIGNIFICANT CHANGE UP (ref 1–3.3)
LYMPHOCYTES # BLD AUTO: 17.3 % — SIGNIFICANT CHANGE UP (ref 13–44)
MCHC RBC-ENTMCNC: 32.8 PG — SIGNIFICANT CHANGE UP (ref 27–34)
MCHC RBC-ENTMCNC: 33.6 G/DL — SIGNIFICANT CHANGE UP (ref 32–36)
MCV RBC AUTO: 97.6 FL — SIGNIFICANT CHANGE UP (ref 80–100)
MONOCYTES # BLD AUTO: 0.54 K/UL — SIGNIFICANT CHANGE UP (ref 0–0.9)
MONOCYTES NFR BLD AUTO: 8.6 % — SIGNIFICANT CHANGE UP (ref 2–14)
NEUTROPHILS # BLD AUTO: 4.48 K/UL — SIGNIFICANT CHANGE UP (ref 1.8–7.4)
NEUTROPHILS NFR BLD AUTO: 71.6 % — SIGNIFICANT CHANGE UP (ref 43–77)
NRBC # BLD: 0 /100 WBCS — SIGNIFICANT CHANGE UP (ref 0–0)
NRBC BLD-RTO: 0 /100 WBCS — SIGNIFICANT CHANGE UP (ref 0–0)
PLATELET # BLD AUTO: 351 K/UL — SIGNIFICANT CHANGE UP (ref 150–400)
RBC # BLD: 3.75 M/UL — LOW (ref 3.8–5.2)
RBC # FLD: 14.6 % — HIGH (ref 10.3–14.5)
WBC # BLD: 6.26 K/UL — SIGNIFICANT CHANGE UP (ref 3.8–10.5)
WBC # FLD AUTO: 6.26 K/UL — SIGNIFICANT CHANGE UP (ref 3.8–10.5)

## 2024-12-02 PROCEDURE — 99213 OFFICE O/P EST LOW 20 MIN: CPT

## 2024-12-03 ENCOUNTER — APPOINTMENT (OUTPATIENT)
Dept: CARDIOLOGY | Facility: CLINIC | Age: 84
End: 2024-12-03
Payer: MEDICARE

## 2024-12-03 ENCOUNTER — NON-APPOINTMENT (OUTPATIENT)
Age: 84
End: 2024-12-03

## 2024-12-03 VITALS
DIASTOLIC BLOOD PRESSURE: 64 MMHG | HEIGHT: 68 IN | BODY MASS INDEX: 22.58 KG/M2 | OXYGEN SATURATION: 98 % | WEIGHT: 149 LBS | HEART RATE: 65 BPM | SYSTOLIC BLOOD PRESSURE: 112 MMHG

## 2024-12-03 DIAGNOSIS — I27.20 PULMONARY HYPERTENSION, UNSPECIFIED: ICD-10-CM

## 2024-12-03 DIAGNOSIS — I25.10 ATHEROSCLEROTIC HEART DISEASE OF NATIVE CORONARY ARTERY W/OUT ANGINA PECTORIS: ICD-10-CM

## 2024-12-03 DIAGNOSIS — R53.83 OTHER FATIGUE: ICD-10-CM

## 2024-12-03 DIAGNOSIS — R06.09 OTHER FORMS OF DYSPNEA: ICD-10-CM

## 2024-12-03 DIAGNOSIS — I48.92 UNSPECIFIED ATRIAL FLUTTER: ICD-10-CM

## 2024-12-03 DIAGNOSIS — I10 ESSENTIAL (PRIMARY) HYPERTENSION: ICD-10-CM

## 2024-12-03 PROCEDURE — 93000 ELECTROCARDIOGRAM COMPLETE: CPT

## 2024-12-03 PROCEDURE — G2211 COMPLEX E/M VISIT ADD ON: CPT

## 2024-12-03 PROCEDURE — 99214 OFFICE O/P EST MOD 30 MIN: CPT

## 2024-12-10 LAB
ALBUMIN SERPL ELPH-MCNC: 4.3 G/DL
ALP BLD-CCNC: 103 U/L
ALT SERPL-CCNC: 24 U/L
ANION GAP SERPL CALC-SCNC: 9 MMOL/L
APTT BLD: 36.7 SEC
AST SERPL-CCNC: 22 U/L
BILIRUB SERPL-MCNC: 0.5 MG/DL
BUN SERPL-MCNC: 20 MG/DL
CALCIUM SERPL-MCNC: 9.7 MG/DL
CHLORIDE SERPL-SCNC: 109 MMOL/L
CO2 SERPL-SCNC: 27 MMOL/L
CREAT SERPL-MCNC: 1.02 MG/DL
EGFR: 54 ML/MIN/1.73M2
GLUCOSE SERPL-MCNC: 95 MG/DL
HCT VFR BLD CALC: 41.5 %
HGB BLD-MCNC: 12.9 G/DL
INR PPP: 1.16 RATIO
MCHC RBC-ENTMCNC: 31.1 G/DL
MCHC RBC-ENTMCNC: 32.3 PG
MCV RBC AUTO: 103.8 FL
PLATELET # BLD AUTO: 365 K/UL
POTASSIUM SERPL-SCNC: 4.7 MMOL/L
PROT SERPL-MCNC: 6.4 G/DL
PT BLD: 13.7 SEC
RBC # BLD: 4 M/UL
RBC # FLD: 14.8 %
SODIUM SERPL-SCNC: 146 MMOL/L
WBC # FLD AUTO: 6.05 K/UL

## 2024-12-16 ENCOUNTER — OUTPATIENT (OUTPATIENT)
Dept: OUTPATIENT SERVICES | Facility: HOSPITAL | Age: 84
LOS: 1 days | Discharge: HOME | End: 2024-12-16
Payer: MEDICARE

## 2024-12-16 ENCOUNTER — TRANSCRIPTION ENCOUNTER (OUTPATIENT)
Age: 84
End: 2024-12-16

## 2024-12-16 VITALS
WEIGHT: 149.91 LBS | SYSTOLIC BLOOD PRESSURE: 150 MMHG | RESPIRATION RATE: 18 BRPM | HEART RATE: 61 BPM | TEMPERATURE: 98 F | HEIGHT: 68 IN | OXYGEN SATURATION: 100 % | DIASTOLIC BLOOD PRESSURE: 70 MMHG

## 2024-12-16 VITALS
DIASTOLIC BLOOD PRESSURE: 68 MMHG | HEART RATE: 58 BPM | OXYGEN SATURATION: 97 % | SYSTOLIC BLOOD PRESSURE: 133 MMHG | RESPIRATION RATE: 18 BRPM

## 2024-12-16 DIAGNOSIS — Z98.890 OTHER SPECIFIED POSTPROCEDURAL STATES: Chronic | ICD-10-CM

## 2024-12-16 DIAGNOSIS — I25.10 ATHEROSCLEROTIC HEART DISEASE OF NATIVE CORONARY ARTERY WITHOUT ANGINA PECTORIS: ICD-10-CM

## 2024-12-16 DIAGNOSIS — Z90.89 ACQUIRED ABSENCE OF OTHER ORGANS: Chronic | ICD-10-CM

## 2024-12-16 DIAGNOSIS — Z41.9 ENCOUNTER FOR PROCEDURE FOR PURPOSES OTHER THAN REMEDYING HEALTH STATE, UNSPECIFIED: Chronic | ICD-10-CM

## 2024-12-16 DIAGNOSIS — R06.02 SHORTNESS OF BREATH: ICD-10-CM

## 2024-12-16 LAB
SAO2 % BLD: 75.5 % — SIGNIFICANT CHANGE UP (ref 60–90)
SAO2 % BLD: 76.1 % — SIGNIFICANT CHANGE UP (ref 60–90)
SAO2 % BLD: 77 % — SIGNIFICANT CHANGE UP (ref 60–90)
SAO2 % BLD: 81.5 % — SIGNIFICANT CHANGE UP (ref 60–90)
SAO2 % BLDA: 95.7 % — SIGNIFICANT CHANGE UP (ref 94–98)
SAO2 % BLDA: 97.4 % — SIGNIFICANT CHANGE UP (ref 94–98)

## 2024-12-16 PROCEDURE — C1889: CPT

## 2024-12-16 PROCEDURE — 93460 R&L HRT ART/VENTRICLE ANGIO: CPT | Mod: 26

## 2024-12-16 PROCEDURE — 82803 BLOOD GASES ANY COMBINATION: CPT | Mod: 91

## 2024-12-16 PROCEDURE — C1887: CPT

## 2024-12-16 PROCEDURE — 93460 R&L HRT ART/VENTRICLE ANGIO: CPT

## 2024-12-16 PROCEDURE — C1769: CPT

## 2024-12-16 PROCEDURE — 99152 MOD SED SAME PHYS/QHP 5/>YRS: CPT

## 2024-12-16 PROCEDURE — C1894: CPT

## 2024-12-16 RX ORDER — SODIUM CHLORIDE 9 MG/ML
250 INJECTION, SOLUTION INTRAMUSCULAR; INTRAVENOUS; SUBCUTANEOUS ONCE
Refills: 0 | Status: COMPLETED | OUTPATIENT
Start: 2024-12-16 | End: 2024-12-16

## 2024-12-16 RX ORDER — SODIUM CHLORIDE 9 MG/ML
1000 INJECTION, SOLUTION INTRAMUSCULAR; INTRAVENOUS; SUBCUTANEOUS
Refills: 0 | Status: DISCONTINUED | OUTPATIENT
Start: 2024-12-16 | End: 2024-12-16

## 2024-12-16 RX ORDER — AMLODIPINE BESYLATE 10 MG/1
1 TABLET ORAL
Refills: 0 | DISCHARGE

## 2024-12-16 RX ADMIN — SODIUM CHLORIDE 138 MILLILITER(S): 9 INJECTION, SOLUTION INTRAMUSCULAR; INTRAVENOUS; SUBCUTANEOUS at 10:53

## 2024-12-16 RX ADMIN — SODIUM CHLORIDE 250 MILLILITER(S): 9 INJECTION, SOLUTION INTRAMUSCULAR; INTRAVENOUS; SUBCUTANEOUS at 08:01

## 2024-12-16 NOTE — H&P ADULT - HISTORY OF PRESENT ILLNESS
84 y.o female with PMHx of HTN, pre-DM, CAD, s/p LAD stent in 8/2023, essential thrombocythemia (followed by hematology) aflutter, s/p DCCV in 12/23presented to cardiology office with c/o dyspnea and fatigue. Pulmonologist cleared for pulmonary etiology. However symptoms persisted. EF 55-60%, moderate to severe pulmonary HTN, moderate to severe TR. Stress test done with unconclusive due to artifact.    Pt referred for LHC with  possible PCI.  84 y.o female with PMHx of HTN, pre-DM, CAD, s/p LAD stent in 8/2023, essential thrombocythemia (followed by hematology) aflutter, s/p DCCV in 12/23 on Xarelto (last dose: 12/13/24) presented to cardiology office with c/o dyspnea and fatigue. Pulmonologist cleared for pulmonary etiology. However symptoms persisted. EF 55-60%, moderate to severe pulmonary HTN, moderate to severe TR. Stress test done with unconclusive due to artifact.    Pt referred for LHC with  possible PCI.

## 2024-12-16 NOTE — ASU DISCHARGE PLAN (ADULT/PEDIATRIC) - ASU DC SPECIAL INSTRUCTIONSFT
* If you have bleeding or sudden swelling at the procedure site, apply direct pressure just above the puncture area for 10-15 min, call [phone] if still bleeding

## 2024-12-16 NOTE — H&P ADULT - NSHPREVIEWOFSYSTEMS_GEN_ALL_CORE
General: Pt denies recent weight loss/fever/chills  Neurological: denies numbness or  sensation loss  Cardiovascular: denies chest pain/palpitations/leg edema  Respiratory and Thorax: denies SOB/cough/wheezing  Gastrointestinal: denies abdominal pain/diarrhea/ nausea/ vomiting/ constipation/bloody stool  Genitourinary: denies urinary frequency/urgency/ dysuria/ hematuria   Musculoskeletal: denies joint pain or swelling/ restricted range of motion  Hematologic: denies abnormal bleeding General: Pt denies recent weight loss/fever/chills  Neurological: denies numbness or  sensation loss  Cardiovascular: denies chest pain/palpitations/leg edema  Respiratory and Thorax: + SOB, denies cough/wheezing  Gastrointestinal: denies abdominal pain/diarrhea/ nausea/ vomiting/ constipation/bloody stool  Genitourinary: denies urinary frequency/urgency/ dysuria/ hematuria   Musculoskeletal: denies joint pain or swelling/ restricted range of motion  Hematologic: denies abnormal bleeding

## 2024-12-16 NOTE — PACU DISCHARGE NOTE - COMMENTS
pt aaox4, pt denies any symptoms, clean dry dressing intact over right wrist, no s/sx of hematoma, swelling, or bleeding noted.  Right wrist soft and mobile. vss, dc instructions given, verbalized understanding, iv removed, no s/sx of infection noted, pt ambulated well prior to dc. Assisted pt to lobby upon dc.

## 2024-12-16 NOTE — ASU PREOP CHECKLIST - STERILIZATION AFFIRMATION
Is This A New Presentation, Or A Follow-Up?: Rash
Additional History: Patient states he went to urgent care a week ago and they told him he Tinea Versicolor but didn’t give him anything.\\n\\nPatient states he was given ketoconzole shampoo and a oral medication when he was in  [facility]
n/a

## 2024-12-16 NOTE — ASU DISCHARGE PLAN (ADULT/PEDIATRIC) - CARE PROVIDER_API CALL
Toribio Case)  Cardiology  [address], [address]  [place] [address]  Phone: [phone]  Fax: [phone]  Follow Up Time:

## 2024-12-16 NOTE — ASU PREOP CHECKLIST - TEMPERATURE IN CELSIUS (DEGREES C)
Oral feeds with readiness to feed cues as tolerated by patient with remainder non-oral means of nutrition/hydration per MD
Oral feeds with readiness to feed cues via Enfamil Slow Flow Soft Nipple (teal) as tolerated by patient with remainder non-oral means of nutrition/hydration per MD.
36.7

## 2024-12-16 NOTE — ASU DISCHARGE PLAN (ADULT/PEDIATRIC) - FINANCIAL ASSISTANCE
[facility] provides services at a reduced cost to those who are determined to be eligible through [facility]’s financial assistance program. Information regarding [facility]’s financial assistance program can be found by going to [email or web address] or by calling [phone].

## 2024-12-16 NOTE — H&P ADULT - NSHPLABSRESULTS_GEN_ALL_CORE
Stress test (9/5/24): small sized, mild defect in apical anterior and mid anterior wall that are fixed, which suggestive of breat attenuation artifact.     Echo (8/5/24): EF 60-65%, mild to mod MR, moderate to severe TR, moderate to severe pulmonary HTN,  PASP 59 mmHg    EKG (12/3/24); NSR at 61 bpm, biphagic T wave in V1-V2 Stress test (9/5/24): small sized, mild defect in apical anterior and mid anterior wall that are fixed, which suggestive of breat attenuation artifact.     Echo (8/5/24): EF 60-65%, mild to mod MR, moderate to severe TR, moderate to severe pulmonary HTN,  PASP 59 mmHg    EKG (12/3/24); NSR at 61 bpm, biphasic T wave in V1-V2

## 2024-12-16 NOTE — PROGRESS NOTE ADULT - SUBJECTIVE AND OBJECTIVE BOX
HPI:  84 y.o female with PMHx of HTN, pre-DM, CAD, s/p LAD stent in 8/2023, essential thrombocythemia (followed by hematology) aflutter, s/p DCCV in 12/23 on Xarelto (last dose: 12/13/24) presented to cardiology office with c/o dyspnea and fatigue. Pulmonologist cleared for pulmonary etiology. However symptoms persisted. EF 55-60%, moderate to severe pulmonary HTN, moderate to severe TR. Stress test done with unconclusive due to artifact.    Pt referred for RHC and LHC with  possible PCI.    Pt underwent RHC and LHC via Rt brachial venous and Rt radial access, revealed mildly elevated Rt heart pressure (RA: 18, RV: 34/-4/6, PA: 35/6/18, PCWP: 7) LVEDP 15 mmHg. and non-obstructive CAD     ROS: denies chest pain/ pressure, SOB or palpitation     Vital Signs;  T(C): 36.7 (12-16-24 @ 07:18), Max: 36.7 (12-16-24 @ 07:18)  HR: 61 (12-16-24 @ 07:18) (61 - 61)  BP: 150/70 (12-16-24 @ 07:18) (150/70 - 150/70)  RR: 18 (12-16-24 @ 07:18) (18 - 18)  SpO2: 100% (12-16-24 @ 07:18) (100% - 100%)    PHYSICAL EXAM:  GENERAL: NAD, well-groomed, well-developed  HEENT - NC/AT, pupils equal and reactive to light,  ; Moist mucous membranes, Good dentition, No lesions  NECK: Supple, No JVD  CHEST/LUNG: Clear to auscultation bilaterally; No rales, rhonchi, wheezing  HEART: Regular rate and rhythm; No murmurs, rubs, or gallops  ABDOMEN: Soft, Nontender, Nondistended; Bowel sounds present  EXTREMITIES:  2+ Peripheral Pulses, No clubbing, cyanosis, or edema  NEURO:  No Focal deficits, sensory and motor intact  SKIN: No rashes or lesions  Access site: Rt radial artery access with radial band and Rt brachial access (s/p sheath pull): no hematoma or bleeding, + 2 radial pulses, capillary refill < 2 sec     Labs:  Cardiac Cath: pending official report     Medications:  sodium chloride 0.9%. 1000 milliLiter(s) IV Continuous <Continuous>  Home Medications:  acetaminophen 325 mg oral tablet: 2 tab(s) orally every 6 hours As needed Temp greater or equal to 38C (100.4F), Mild Pain (1 - 3) (16 Dec 2024 07:51)  atorvastatin 20 mg oral tablet: 1 tab(s) orally once a day (16 Dec 2024 07:51)  dorzolamide-timolol 2.23%-0.68% (2%-0.5% base) ophthalmic solution: 1 drop(s) in each eye 2 times a day (16 Dec 2024 07:51)  guaiFENesin 100 mg/5 mL oral liquid: 5 milliliter(s) orally every 6 hours As needed Cough (16 Dec 2024 07:51)  hydroxyurea 500 mg oral capsule: 500 milligram(s) orally once a day take on M-W-F (16 Dec 2024 07:51)  loratadine 10 mg oral tablet: 1 tab(s) orally once a day (at bedtime) (16 Dec 2024 07:51)  Norvasc 5 mg oral tablet: 1 tab(s) orally once a day (16 Dec 2024 07:51)  Xarelto 20 mg oral tablet: 1 tab(s) orally once a day (at bedtime) (16 Dec 2024 07:51)    Assessment/ Plan  84 y.o female with PMHx of HTN, pre-DM, CAD, s/p LAD stent in 8/2023, essential thrombocythemia (followed by hematology) aflutter, s/p DCCV in 12/23 on Xarelto (last dose: 12/13/24) presented to cardiology office with c/o dyspnea and fatigue. Pulmonologist cleared for pulmonary etiology. However symptoms persisted. EF 55-60%, moderate to severe pulmonary HTN, moderate to severe TR. Stress test done with unconclusive due to artifact.    Pt underwent RHC and LHC via Rt brachial venous and Rt radial access, revealed mildly elevated Rt heart pressure (RA: 18, RV: 34/-4/6, PA: 35/6/18, PCWP: 7) LVEDP 15 mmHg. and non-obstructive CAD   - post IV hydration: NS at 138cc/hr x2 hrs   - resume Xarelto tonight   - continue Norvasc 5 mg daily   - continue statin, lipitor 20 gm daily   - post procedure, outcome and follow up care reviewed with patient and Dr. Toney   - Discussed therapeutic lifestyle modifications to reduce CAD risk factors including cardiac diet, weight control, exercise, smoking cessation, medication compliance and regular outpt follow-up.   -  discharge home today   - follow up with cardiologist in 1-2 weeks as an outpt     Discussed the plan with Dr. Toney, Pt and Cath RN.

## 2024-12-16 NOTE — H&P ADULT - PROBLEM SELECTOR PLAN 1
Caller: Tania Wolfe LESLIE    Relationship: Self    Best call back number: [phone]    Requested Prescriptions:   Requested Prescriptions     Pending Prescriptions Disp Refills   • metoprolol tartrate (LOPRESSOR) 50 MG tablet 180 tablet 1     Sig: Take 1 tablet by mouth Every 12 (Twelve) Hours.        Pharmacy where request should be sent: [facility]     Additional details provided by patient: PT REPORTS DR POWELL STARTED HER ON THE MEDICATION BUT DUE TO NO WORSENING SYMPTOMS ONLY SEES HIM PRN - DR LYNN TAKES CARE OF HER CARDIAC CARE AND PT REQUESTING HE TAKE OVER PRESCRIPTION AS WELL - REQUESTING 90 DAY PRESCRIPTION TO [facility], PT IS COMPLETELY OUT    Does the patient have less than a 3 day supply:  [x] Yes  [] No    Would you like a call back once the refill request has been completed: [x] Yes [] No    If the office needs to give you a call back, can they leave a voicemail: [x] Yes [] No    Montez Hyman Rep   02/03/23 15:10 EST      Plan for [facility] with possibel PCI    - MARY protocol pre hydration: no warranted at this time   - Procedure, its risks, alternatives, benefits and potential complications were discussed in detail. Risks include but not limited to bleeding, infection, allergy, renal failure requiring dialysis, stroke, vascular injury, pericardial tamponade, arrhythmias, MI and even death. Pt is agreeable and has consented for cardiac catheterization with possible stent placement and possible sedation and/ or analgesia. Plan for [facility] with possibel PCI    - MARY protocol :  IV bolus x1   - Procedure, its risks, alternatives, benefits and potential complications were discussed in detail. Risks include but not limited to bleeding, infection, allergy, renal failure requiring dialysis, stroke, vascular injury, pericardial tamponade, arrhythmias, MI and even death. Pt is agreeable and has consented for cardiac catheterization with possible stent placement and possible sedation and/ or analgesia.

## 2024-12-16 NOTE — H&P ADULT - ASSESSMENT
84 y.o female with PMHx of HTN, pre-DM, CAD, s/p LAD stent in 8/2023, essential thrombocythemia (followed by hematology) aflutter, s/p DCCV in 12/23presented to cardiology office with c/o dyspnea and fatigue. Pulmonologist cleared for pulmonary etiology. However symptoms persisted. EF 55-60%, moderate to severe pulmonary HTN, moderate to severe TR. Stress test done with unconclusive due to artifact.    Pt referred for LHC with  possible PCI.     ASA class: III  Cr: 1.02  GFR: 54  Bleeding risk:  Ric score:    84 y.o female with PMHx of HTN, pre-DM, CAD, s/p LAD stent in 8/2023, essential thrombocythemia (followed by hematology) aflutter, s/p DCCV in 12/23presented to cardiology office with c/o dyspnea and fatigue. Pulmonologist cleared for pulmonary etiology. However symptoms persisted. EF 55-60%, moderate to severe pulmonary HTN, moderate to severe TR. Stress test done with unconclusive due to artifact.    Pt referred for LHC with  possible PCI.     ASA class: III  Cr: 1.02  GFR: 54  Bleeding risk:2.7%   Ric score: 7 points

## 2024-12-16 NOTE — ASU DISCHARGE PLAN (ADULT/PEDIATRIC) - NS MD DC FALL RISK RISK
For information on Fall & Injury Prevention, visit: [email or web address] OR  [email or web address] OR  https://www.cdc.gov/steadi/patient.html

## 2024-12-16 NOTE — H&P ADULT - NSHPPHYSICALEXAM_GEN_ALL_CORE
Vital Signs Last 24 Hrs  T(C): 36.6 (16 Dec 2024 07:18), Max: 36.6 (16 Dec 2024 07:18)  T(F): 97.8 (16 Dec 2024 07:18), Max: 97.8 (16 Dec 2024 07:18)  HR: 61 (16 Dec 2024 07:18) (61 - 61)  BP: 150/70 (16 Dec 2024 07:18) (150/70 - 150/70)  RR: 18 (16 Dec 2024 07:18) (18 - 18)  SpO2: 100% (16 Dec 2024 07:18) (100% - 100%)    Parameters below as of 16 Dec 2024 07:18  Patient On (Oxygen Delivery Method): room air        Constitutional: well developed, well nourished, no deformities and no acute distress  Neurological: Alert & Oriented x 3, MARTELL, no focal deficits  HEENT: NC/AT, PERRLA, EOMI,  Neck supple.  Respiratory: CTA B/L, No wheezing/crackles/rhonchi  Cardiovascular: (+) S1 & S2, RRR, No murmur/ rub/ gallop   Gastrointestinal: soft, Nontender, nondistended, (+) BS  Genitourinary: non distended bladder, voiding freely  Extremities: No pedal edema, No clubbing, No cyanosis, 2+  PT/ DP pulses   Skin:  normal skin color and pigmentation, no skin lesions

## 2024-12-17 NOTE — POST DISCHARGE NOTE - DATE/TIME OF NOTIFICATION:
Test was performed.  :1956  AGE:67 year old  Ordering provider: Erinn Holden  Diagnosis: Chest discomfort (R07.89)      17-Dec-2024 11:14 19-Dec-2024 13:21

## 2024-12-23 DIAGNOSIS — I25.10 ATHEROSCLEROTIC HEART DISEASE OF NATIVE CORONARY ARTERY WITHOUT ANGINA PECTORIS: ICD-10-CM

## 2024-12-23 DIAGNOSIS — I10 ESSENTIAL (PRIMARY) HYPERTENSION: ICD-10-CM

## 2024-12-23 DIAGNOSIS — Z95.5 PRESENCE OF CORONARY ANGIOPLASTY IMPLANT AND GRAFT: ICD-10-CM

## 2024-12-23 DIAGNOSIS — E78.5 HYPERLIPIDEMIA, UNSPECIFIED: ICD-10-CM

## 2025-01-03 ENCOUNTER — APPOINTMENT (OUTPATIENT)
Dept: CARDIOLOGY | Facility: CLINIC | Age: 85
End: 2025-01-03

## 2025-01-07 ENCOUNTER — NON-APPOINTMENT (OUTPATIENT)
Age: 85
End: 2025-01-07

## 2025-01-07 ENCOUNTER — APPOINTMENT (OUTPATIENT)
Dept: CARDIOLOGY | Facility: CLINIC | Age: 85
End: 2025-01-07

## 2025-01-07 VITALS
HEIGHT: 68 IN | SYSTOLIC BLOOD PRESSURE: 118 MMHG | HEART RATE: 60 BPM | DIASTOLIC BLOOD PRESSURE: 66 MMHG | OXYGEN SATURATION: 100 % | WEIGHT: 150 LBS | BODY MASS INDEX: 22.73 KG/M2

## 2025-01-07 DIAGNOSIS — R06.09 OTHER FORMS OF DYSPNEA: ICD-10-CM

## 2025-01-07 DIAGNOSIS — I25.10 ATHEROSCLEROTIC HEART DISEASE OF NATIVE CORONARY ARTERY W/OUT ANGINA PECTORIS: ICD-10-CM

## 2025-01-07 DIAGNOSIS — I48.92 UNSPECIFIED ATRIAL FLUTTER: ICD-10-CM

## 2025-01-07 PROCEDURE — G2211 COMPLEX E/M VISIT ADD ON: CPT

## 2025-01-07 PROCEDURE — 93000 ELECTROCARDIOGRAM COMPLETE: CPT

## 2025-01-07 PROCEDURE — 99214 OFFICE O/P EST MOD 30 MIN: CPT

## 2025-01-27 ENCOUNTER — OFFICE (OUTPATIENT)
Dept: URBAN - METROPOLITAN AREA CLINIC 102 | Facility: CLINIC | Age: 85
Setting detail: OPHTHALMOLOGY
End: 2025-01-27
Payer: MEDICARE

## 2025-01-27 DIAGNOSIS — H47.323: ICD-10-CM

## 2025-01-27 DIAGNOSIS — H25.13: ICD-10-CM

## 2025-01-27 DIAGNOSIS — H40.1121: ICD-10-CM

## 2025-01-27 DIAGNOSIS — H40.1112: ICD-10-CM

## 2025-01-27 DIAGNOSIS — H35.3121: ICD-10-CM

## 2025-01-27 DIAGNOSIS — H35.363: ICD-10-CM

## 2025-01-27 DIAGNOSIS — H35.3112: ICD-10-CM

## 2025-01-27 DIAGNOSIS — H16.223: ICD-10-CM

## 2025-01-27 DIAGNOSIS — H40.033: ICD-10-CM

## 2025-01-27 PROCEDURE — 92250 FUNDUS PHOTOGRAPHY W/I&R: CPT | Performed by: OPHTHALMOLOGY

## 2025-01-27 PROCEDURE — 92020 GONIOSCOPY: CPT | Performed by: OPHTHALMOLOGY

## 2025-01-27 PROCEDURE — 92133 CPTRZD OPH DX IMG PST SGM ON: CPT | Performed by: OPHTHALMOLOGY

## 2025-01-27 PROCEDURE — 99214 OFFICE O/P EST MOD 30 MIN: CPT | Performed by: OPHTHALMOLOGY

## 2025-01-27 ASSESSMENT — REFRACTION_CURRENTRX
OD_CYLINDER: -1.25
OS_ADD: +2.75
OD_OVR_VA: 20/
OS_CYLINDER: -1.25
OS_AXIS: 102
OD_VPRISM_DIRECTION: BF
OS_OVR_VA: 20/
OS_VPRISM_DIRECTION: BF
OD_AXIS: 088
OD_SPHERE: +3.25
OD_ADD: +2.75
OS_SPHERE: +3.25

## 2025-01-27 ASSESSMENT — REFRACTION_MANIFEST
OS_AXIS: 085
OS_VA1: 20/40+2
OS_VA1: 20/25
OD_VA1: 20/50+2
OS_SPHERE: +2.25
OD_SPHERE: +3.50
OS_CYLINDER: -3.50
OD_CYLINDER: -2.00
OD_SPHERE: +3.75
OD_VA1: 20/NI
OD_CYLINDER: -2.00
OD_AXIS: 089
OU_VA: 20/25
OS_AXIS: 088
OD_AXIS: 090
OS_CYLINDER: -2.50
OS_SPHERE: +3.75

## 2025-01-27 ASSESSMENT — KERATOMETRY
OD_K2POWER_DIOPTERS: 45.25
OD_AXISANGLE_DEGREES: 179
OD_K1POWER_DIOPTERS: 43.50
OS_AXISANGLE_DEGREES: 171
OS_K2POWER_DIOPTERS: 45.75
METHOD_AUTO_MANUAL: AUTO
OS_K1POWER_DIOPTERS: 44.00

## 2025-01-27 ASSESSMENT — REFRACTION_AUTOREFRACTION
OS_CYLINDER: -3.50
OD_CYLINDER: -2.00
OD_SPHERE: +3.50
OS_SPHERE: +2.25
OS_AXIS: 088
OD_AXIS: 089

## 2025-01-27 ASSESSMENT — VISUAL ACUITY
OD_BCVA: 20/30-2
OS_BCVA: 20/50-1

## 2025-01-27 ASSESSMENT — DECREASING TEAR LAKE - SEVERITY SCORE
OD_DEC_TEARLAKE: T
OS_DEC_TEARLAKE: T

## 2025-01-27 ASSESSMENT — CONFRONTATIONAL VISUAL FIELD TEST (CVF)
OD_FINDINGS: FULL
OS_FINDINGS: FULL

## 2025-01-27 ASSESSMENT — TONOMETRY
OS_IOP_MMHG: 20
OD_IOP_MMHG: 12
OD_IOP_MMHG: 20
OS_IOP_MMHG: 11

## 2025-02-03 ENCOUNTER — APPOINTMENT (OUTPATIENT)
Dept: INTERNAL MEDICINE | Facility: CLINIC | Age: 85
End: 2025-02-03

## 2025-02-05 NOTE — PATIENT PROFILE ADULT - FUNCTIONAL ASSESSMENT - DAILY ACTIVITY 1.
Recorded Pressure: PCW, HR=65, Condition=Condition 1 (Pulmonary Capillary Wedge) PCW 19/25/17* 3 = A little assistance

## 2025-02-06 ENCOUNTER — APPOINTMENT (OUTPATIENT)
Dept: INTERNAL MEDICINE | Facility: CLINIC | Age: 85
End: 2025-02-06
Payer: MEDICARE

## 2025-02-06 VITALS
HEIGHT: 68 IN | HEART RATE: 56 BPM | BODY MASS INDEX: 22.58 KG/M2 | SYSTOLIC BLOOD PRESSURE: 124 MMHG | WEIGHT: 149 LBS | OXYGEN SATURATION: 98 % | DIASTOLIC BLOOD PRESSURE: 74 MMHG | TEMPERATURE: 97.3 F

## 2025-02-06 DIAGNOSIS — D47.3 ESSENTIAL (HEMORRHAGIC) THROMBOCYTHEMIA: ICD-10-CM

## 2025-02-06 DIAGNOSIS — I48.92 UNSPECIFIED ATRIAL FLUTTER: ICD-10-CM

## 2025-02-06 DIAGNOSIS — R73.03 PREDIABETES.: ICD-10-CM

## 2025-02-06 DIAGNOSIS — I47.29 OTHER VENTRICULAR TACHYCARDIA: ICD-10-CM

## 2025-02-06 DIAGNOSIS — Z23 ENCOUNTER FOR IMMUNIZATION: ICD-10-CM

## 2025-02-06 DIAGNOSIS — Z00.00 ENCOUNTER FOR GENERAL ADULT MEDICAL EXAMINATION W/OUT ABNORMAL FINDINGS: ICD-10-CM

## 2025-02-06 DIAGNOSIS — I27.20 PULMONARY HYPERTENSION, UNSPECIFIED: ICD-10-CM

## 2025-02-06 DIAGNOSIS — I48.0 PAROXYSMAL ATRIAL FIBRILLATION: ICD-10-CM

## 2025-02-06 LAB
25(OH)D3 SERPL-MCNC: 33 NG/ML
ALBUMIN SERPL ELPH-MCNC: 4.3 G/DL
ALP BLD-CCNC: 98 U/L
ALT SERPL-CCNC: 14 U/L
ANION GAP SERPL CALC-SCNC: 10 MMOL/L
APPEARANCE: CLEAR
AST SERPL-CCNC: 16 U/L
BACTERIA: NEGATIVE /HPF
BASOPHILS # BLD AUTO: 0.04 K/UL
BASOPHILS NFR BLD AUTO: 0.7 %
BILIRUB SERPL-MCNC: 0.4 MG/DL
BILIRUBIN URINE: NEGATIVE
BLOOD URINE: NEGATIVE
BUN SERPL-MCNC: 18 MG/DL
CALCIUM SERPL-MCNC: 9.4 MG/DL
CAST: 7 /LPF
CHLORIDE SERPL-SCNC: 111 MMOL/L
CHOLEST SERPL-MCNC: 138 MG/DL
CO2 SERPL-SCNC: 25 MMOL/L
COLOR: YELLOW
CREAT SERPL-MCNC: 0.99 MG/DL
EGFR: 56 ML/MIN/1.73M2
EOSINOPHIL # BLD AUTO: 0.1 K/UL
EOSINOPHIL NFR BLD AUTO: 1.6 %
EPITHELIAL CELLS: 2 /HPF
ESTIMATED AVERAGE GLUCOSE: 126 MG/DL
GLUCOSE QUALITATIVE U: NEGATIVE MG/DL
GLUCOSE SERPL-MCNC: 97 MG/DL
HBA1C MFR BLD HPLC: 6 %
HCT VFR BLD CALC: 40.7 %
HDLC SERPL-MCNC: 66 MG/DL
HGB BLD-MCNC: 13 G/DL
HYALINE CASTS: PRESENT
IMM GRANULOCYTES NFR BLD AUTO: 0.3 %
KETONES URINE: NEGATIVE MG/DL
LDLC SERPL CALC-MCNC: 61 MG/DL
LEUKOCYTE ESTERASE URINE: ABNORMAL
LYMPHOCYTES # BLD AUTO: 0.85 K/UL
LYMPHOCYTES NFR BLD AUTO: 13.9 %
MAN DIFF?: NORMAL
MCHC RBC-ENTMCNC: 31.9 G/DL
MCHC RBC-ENTMCNC: 32 PG
MCV RBC AUTO: 100.2 FL
MICROSCOPIC-UA: NORMAL
MONOCYTES # BLD AUTO: 0.42 K/UL
MONOCYTES NFR BLD AUTO: 6.9 %
NEUTROPHILS # BLD AUTO: 4.67 K/UL
NEUTROPHILS NFR BLD AUTO: 76.6 %
NITRITE URINE: NEGATIVE
NONHDLC SERPL-MCNC: 72 MG/DL
PH URINE: 5.5
PLATELET # BLD AUTO: 338 K/UL
POTASSIUM SERPL-SCNC: 4.9 MMOL/L
PROT SERPL-MCNC: 6.4 G/DL
PROTEIN URINE: NEGATIVE MG/DL
RBC # BLD: 4.06 M/UL
RBC # FLD: 14.7 %
RED BLOOD CELLS URINE: 2 /HPF
REVIEW: NORMAL
SODIUM SERPL-SCNC: 146 MMOL/L
SPECIFIC GRAVITY URINE: 1.01
TRIGL SERPL-MCNC: 50 MG/DL
TSH SERPL-ACNC: 2.55 UIU/ML
UROBILINOGEN URINE: 0.2 MG/DL
WBC # FLD AUTO: 6.1 K/UL
WHITE BLOOD CELLS URINE: 17 /HPF

## 2025-02-06 PROCEDURE — G0439: CPT

## 2025-02-06 PROCEDURE — G0009: CPT

## 2025-02-06 PROCEDURE — 90677 PCV20 VACCINE IM: CPT

## 2025-03-10 ENCOUNTER — OFFICE (OUTPATIENT)
Dept: URBAN - METROPOLITAN AREA CLINIC 102 | Facility: CLINIC | Age: 85
Setting detail: OPHTHALMOLOGY
End: 2025-03-10
Payer: MEDICARE

## 2025-03-10 DIAGNOSIS — H16.223: ICD-10-CM

## 2025-03-10 DIAGNOSIS — H40.1121: ICD-10-CM

## 2025-03-10 DIAGNOSIS — H25.13: ICD-10-CM

## 2025-03-10 DIAGNOSIS — H40.033: ICD-10-CM

## 2025-03-10 DIAGNOSIS — H35.363: ICD-10-CM

## 2025-03-10 DIAGNOSIS — H47.323: ICD-10-CM

## 2025-03-10 DIAGNOSIS — H40.1112: ICD-10-CM

## 2025-03-10 DIAGNOSIS — H35.3121: ICD-10-CM

## 2025-03-10 DIAGNOSIS — H35.3112: ICD-10-CM

## 2025-03-10 PROCEDURE — 99213 OFFICE O/P EST LOW 20 MIN: CPT | Performed by: OPHTHALMOLOGY

## 2025-03-10 ASSESSMENT — REFRACTION_AUTOREFRACTION
OS_SPHERE: +2.00
OD_CYLINDER: -2.50
OD_SPHERE: +3.50
OS_AXIS: 087
OS_CYLINDER: -3.50
OD_AXIS: 094

## 2025-03-10 ASSESSMENT — REFRACTION_MANIFEST
OD_SPHERE: +3.50
OD_CYLINDER: -2.50
OS_AXIS: 090
OS_CYLINDER: -3.50
OD_VA1: 20/NI
OU_VA: 20/25
OS_VA1: 20/NI
OS_SPHERE: +1.50
OS_VA1: 20/40+2
OD_SPHERE: +3.00
OS_SPHERE: +2.25
OS_CYLINDER: -3.25
OD_VA1: 20/50-
OD_CYLINDER: -2.00
OD_AXIS: 095
OS_AXIS: 088
OD_AXIS: 089

## 2025-03-10 ASSESSMENT — REFRACTION_CURRENTRX
OS_ADD: +2.75
OD_OVR_VA: 20/
OD_SPHERE: +3.25
OS_VPRISM_DIRECTION: BF
OD_VPRISM_DIRECTION: BF
OS_AXIS: 102
OD_AXIS: 088
OS_OVR_VA: 20/
OS_CYLINDER: -1.25
OD_CYLINDER: -1.25
OS_SPHERE: +3.25
OD_ADD: +2.75

## 2025-03-10 ASSESSMENT — KERATOMETRY
OS_K2POWER_DIOPTERS: 45.75
OS_AXISANGLE_DEGREES: 169
OD_K1POWER_DIOPTERS: 43.00
OD_AXISANGLE_DEGREES: 45.
METHOD_AUTO_MANUAL: AUTO
OS_K1POWER_DIOPTERS: 45.00
OD_K2POWER_DIOPTERS: 45.25

## 2025-03-10 ASSESSMENT — VISUAL ACUITY
OS_BCVA: 20/60-1
OD_BCVA: 20/50-2

## 2025-03-10 ASSESSMENT — TONOMETRY
OS_IOP_MMHG: 10
OD_IOP_MMHG: 15

## 2025-03-10 ASSESSMENT — CONFRONTATIONAL VISUAL FIELD TEST (CVF)
OD_FINDINGS: FULL
OS_FINDINGS: FULL

## 2025-03-10 ASSESSMENT — DECREASING TEAR LAKE - SEVERITY SCORE
OS_DEC_TEARLAKE: T
OD_DEC_TEARLAKE: T

## 2025-03-25 DIAGNOSIS — N18.31 CHRONIC KIDNEY DISEASE, STAGE 3A: ICD-10-CM

## 2025-03-25 PROBLEM — Z87.09 HISTORY OF VASOMOTOR RHINITIS: Status: RESOLVED | Noted: 2022-09-19 | Resolved: 2025-03-25

## 2025-03-25 PROBLEM — R91.8 PULMONARY INFILTRATE: Status: RESOLVED | Noted: 2024-08-06 | Resolved: 2025-03-25

## 2025-03-25 PROBLEM — Z87.898 HISTORY OF SHORTNESS OF BREATH: Status: RESOLVED | Noted: 2024-08-06 | Resolved: 2025-03-25

## 2025-03-25 PROBLEM — Z87.898 HISTORY OF POSTNASAL DRIP: Status: RESOLVED | Noted: 2022-09-19 | Resolved: 2025-03-25

## 2025-03-25 PROBLEM — M54.9 ACUTE BACK PAIN: Status: RESOLVED | Noted: 2022-04-15 | Resolved: 2025-03-25

## 2025-03-25 PROBLEM — J30.2 SEASONAL ALLERGIES: Status: RESOLVED | Noted: 2017-01-27 | Resolved: 2025-03-25

## 2025-03-25 PROBLEM — J40 BRONCHITIS WITH TRACHEITIS: Status: RESOLVED | Noted: 2018-02-01 | Resolved: 2025-03-25

## 2025-03-25 PROBLEM — J30.1 ALLERGIC RHINITIS DUE TO POLLEN: Status: RESOLVED | Noted: 2018-01-12 | Resolved: 2025-03-25

## 2025-03-25 PROBLEM — R93.89 ABNORMAL CXR: Status: RESOLVED | Noted: 2024-09-10 | Resolved: 2025-03-25

## 2025-03-25 PROBLEM — Z86.2 HISTORY OF THROMBOCYTOSIS: Status: RESOLVED | Noted: 2023-06-16 | Resolved: 2025-03-25

## 2025-03-25 PROBLEM — R03.0 BORDERLINE HYPERTENSION: Status: RESOLVED | Noted: 2018-01-12 | Resolved: 2025-03-25

## 2025-03-27 ENCOUNTER — OUTPATIENT (OUTPATIENT)
Dept: OUTPATIENT SERVICES | Facility: HOSPITAL | Age: 85
LOS: 1 days | Discharge: HOME | End: 2025-03-27

## 2025-03-27 DIAGNOSIS — D47.3 ESSENTIAL (HEMORRHAGIC) THROMBOCYTHEMIA: ICD-10-CM

## 2025-03-27 DIAGNOSIS — Z41.9 ENCOUNTER FOR PROCEDURE FOR PURPOSES OTHER THAN REMEDYING HEALTH STATE, UNSPECIFIED: Chronic | ICD-10-CM

## 2025-03-27 DIAGNOSIS — D75.839 THROMBOCYTOSIS, UNSPECIFIED: ICD-10-CM

## 2025-03-27 DIAGNOSIS — Z98.890 OTHER SPECIFIED POSTPROCEDURAL STATES: Chronic | ICD-10-CM

## 2025-03-27 DIAGNOSIS — Z90.89 ACQUIRED ABSENCE OF OTHER ORGANS: Chronic | ICD-10-CM

## 2025-03-27 DIAGNOSIS — Z51.81 ENCOUNTER FOR THERAPEUTIC DRUG LEVEL MONITORING: ICD-10-CM

## 2025-03-31 ENCOUNTER — RESULT REVIEW (OUTPATIENT)
Age: 85
End: 2025-03-31

## 2025-03-31 ENCOUNTER — APPOINTMENT (OUTPATIENT)
Dept: HEMATOLOGY ONCOLOGY | Facility: CLINIC | Age: 85
End: 2025-03-31
Payer: MEDICARE

## 2025-03-31 VITALS
HEIGHT: 68 IN | OXYGEN SATURATION: 98 % | HEART RATE: 59 BPM | DIASTOLIC BLOOD PRESSURE: 72 MMHG | WEIGHT: 151 LBS | BODY MASS INDEX: 22.88 KG/M2 | SYSTOLIC BLOOD PRESSURE: 121 MMHG | TEMPERATURE: 97.3 F

## 2025-03-31 DIAGNOSIS — R91.8 OTHER NONSPECIFIC ABNORMAL FINDING OF LUNG FIELD: ICD-10-CM

## 2025-03-31 DIAGNOSIS — M79.605 PAIN IN LEFT LEG: ICD-10-CM

## 2025-03-31 DIAGNOSIS — J30.1 ALLERGIC RHINITIS DUE TO POLLEN: ICD-10-CM

## 2025-03-31 DIAGNOSIS — R03.0 ELEVATED BLOOD-PRESSURE READING, W/OUT DIAGNOSIS OF HYPERTENSION: ICD-10-CM

## 2025-03-31 DIAGNOSIS — E53.8 DEFICIENCY OF OTHER SPECIFIED B GROUP VITAMINS: ICD-10-CM

## 2025-03-31 DIAGNOSIS — R07.89 OTHER CHEST PAIN: ICD-10-CM

## 2025-03-31 DIAGNOSIS — Z79.64 ENCOUNTER FOR THERAPEUTIC DRUG LVL MONITORING: ICD-10-CM

## 2025-03-31 DIAGNOSIS — Z87.898 PERSONAL HISTORY OF OTHER SPECIFIED CONDITIONS: ICD-10-CM

## 2025-03-31 DIAGNOSIS — J40 BRONCHITIS, NOT SPECIFIED AS ACUTE OR CHRONIC: ICD-10-CM

## 2025-03-31 DIAGNOSIS — Z87.09 PERSONAL HISTORY OF OTHER DISEASES OF THE RESPIRATORY SYSTEM: ICD-10-CM

## 2025-03-31 DIAGNOSIS — D47.3 ESSENTIAL (HEMORRHAGIC) THROMBOCYTHEMIA: ICD-10-CM

## 2025-03-31 DIAGNOSIS — Z86.2 PERSONAL HISTORY OF DISEASES OF THE BLOOD AND BLOOD-FORMING ORGANS AND CERTAIN DISORDERS INVOLVING THE IMMUNE MECHANISM: ICD-10-CM

## 2025-03-31 DIAGNOSIS — R93.89 ABNORMAL FINDINGS ON DIAGNOSTIC IMAGING OF OTHER SPECIFIED BODY STRUCTURES: ICD-10-CM

## 2025-03-31 DIAGNOSIS — J30.2 OTHER SEASONAL ALLERGIC RHINITIS: ICD-10-CM

## 2025-03-31 DIAGNOSIS — M54.9 DORSALGIA, UNSPECIFIED: ICD-10-CM

## 2025-03-31 DIAGNOSIS — Z51.81 ENCOUNTER FOR THERAPEUTIC DRUG LVL MONITORING: ICD-10-CM

## 2025-03-31 DIAGNOSIS — R73.03 PREDIABETES.: ICD-10-CM

## 2025-03-31 DIAGNOSIS — Z79.01 LONG TERM (CURRENT) USE OF ANTICOAGULANTS: ICD-10-CM

## 2025-03-31 LAB
BASOPHILS # BLD AUTO: 0.04 K/UL — SIGNIFICANT CHANGE UP (ref 0–0.2)
BASOPHILS NFR BLD AUTO: 0.7 % — SIGNIFICANT CHANGE UP (ref 0–2)
EOSINOPHIL # BLD AUTO: 0.09 K/UL — SIGNIFICANT CHANGE UP (ref 0–0.5)
EOSINOPHIL NFR BLD AUTO: 1.5 % — SIGNIFICANT CHANGE UP (ref 0–6)
ERYTHROCYTE [SEDIMENTATION RATE] IN BLOOD BY WESTERGREN METHOD: 4 MM/HR
FERRITIN SERPL-MCNC: 98 NG/ML
FOLATE SERPL-MCNC: 18.1 NG/ML
HCT VFR BLD CALC: 39.1 % — SIGNIFICANT CHANGE UP (ref 34.5–45)
HGB BLD-MCNC: 12.7 G/DL — SIGNIFICANT CHANGE UP (ref 11.5–15.5)
IMM GRANULOCYTES NFR BLD AUTO: 0.3 % — SIGNIFICANT CHANGE UP (ref 0–0.9)
IRON SATN MFR SERPL: 25 %
IRON SERPL-MCNC: 75 UG/DL
LYMPHOCYTES # BLD AUTO: 0.92 K/UL — LOW (ref 1–3.3)
LYMPHOCYTES # BLD AUTO: 15.4 % — SIGNIFICANT CHANGE UP (ref 13–44)
MCHC RBC-ENTMCNC: 31.7 PG — SIGNIFICANT CHANGE UP (ref 27–34)
MCHC RBC-ENTMCNC: 32.5 G/DL — SIGNIFICANT CHANGE UP (ref 32–36)
MCV RBC AUTO: 97.5 FL — SIGNIFICANT CHANGE UP (ref 80–100)
MONOCYTES # BLD AUTO: 0.43 K/UL — SIGNIFICANT CHANGE UP (ref 0–0.9)
MONOCYTES NFR BLD AUTO: 7.2 % — SIGNIFICANT CHANGE UP (ref 2–14)
NEUTROPHILS # BLD AUTO: 4.46 K/UL — SIGNIFICANT CHANGE UP (ref 1.8–7.4)
NEUTROPHILS NFR BLD AUTO: 74.9 % — SIGNIFICANT CHANGE UP (ref 43–77)
NRBC BLD AUTO-RTO: 0 /100 WBCS — SIGNIFICANT CHANGE UP (ref 0–0)
PLATELET # BLD AUTO: 337 K/UL — SIGNIFICANT CHANGE UP (ref 150–400)
RBC # BLD: 4.01 M/UL — SIGNIFICANT CHANGE UP (ref 3.8–5.2)
RBC # FLD: 14.5 % — SIGNIFICANT CHANGE UP (ref 10.3–14.5)
TIBC SERPL-MCNC: 298 UG/DL
UIBC SERPL-MCNC: 223 UG/DL
VIT B12 SERPL-MCNC: 885 PG/ML
WBC # BLD: 5.96 K/UL — SIGNIFICANT CHANGE UP (ref 3.8–10.5)
WBC # FLD AUTO: 5.96 K/UL — SIGNIFICANT CHANGE UP (ref 3.8–10.5)

## 2025-03-31 PROCEDURE — G2211 COMPLEX E/M VISIT ADD ON: CPT

## 2025-03-31 PROCEDURE — 99213 OFFICE O/P EST LOW 20 MIN: CPT

## 2025-03-31 RX ORDER — CHROMIUM 200 MCG
TABLET ORAL
Refills: 0 | Status: ACTIVE | COMMUNITY

## 2025-03-31 RX ORDER — CYANOCOBALAMIN (VITAMIN B-12) 1000 MCG
TABLET ORAL
Refills: 0 | Status: ACTIVE | COMMUNITY

## 2025-03-31 RX ORDER — VIT A/VIT C/VIT E/ZINC/COPPER 2148-113
TABLET ORAL
Refills: 0 | Status: ACTIVE | COMMUNITY

## 2025-03-31 RX ORDER — MAGNESIUM OXIDE/MAG AA CHELATE 300 MG
CAPSULE ORAL
Refills: 0 | Status: ACTIVE | COMMUNITY

## 2025-04-11 ENCOUNTER — APPOINTMENT (OUTPATIENT)
Dept: CARDIOLOGY | Facility: CLINIC | Age: 85
End: 2025-04-11

## 2025-04-11 VITALS
HEART RATE: 63 BPM | HEIGHT: 68 IN | SYSTOLIC BLOOD PRESSURE: 126 MMHG | WEIGHT: 150 LBS | BODY MASS INDEX: 22.73 KG/M2 | DIASTOLIC BLOOD PRESSURE: 74 MMHG | OXYGEN SATURATION: 100 %

## 2025-04-11 PROCEDURE — 99214 OFFICE O/P EST MOD 30 MIN: CPT

## 2025-04-11 PROCEDURE — G2211 COMPLEX E/M VISIT ADD ON: CPT

## 2025-04-11 PROCEDURE — 93000 ELECTROCARDIOGRAM COMPLETE: CPT

## 2025-04-26 ENCOUNTER — NON-APPOINTMENT (OUTPATIENT)
Age: 85
End: 2025-04-26

## 2025-05-05 ENCOUNTER — APPOINTMENT (OUTPATIENT)
Dept: INTERNAL MEDICINE | Facility: CLINIC | Age: 85
End: 2025-05-05
Payer: MEDICARE

## 2025-05-05 ENCOUNTER — APPOINTMENT (OUTPATIENT)
Dept: PULMONOLOGY | Facility: CLINIC | Age: 85
End: 2025-05-05
Payer: MEDICARE

## 2025-05-05 VITALS
BODY MASS INDEX: 21.82 KG/M2 | HEART RATE: 62 BPM | OXYGEN SATURATION: 98 % | WEIGHT: 144 LBS | RESPIRATION RATE: 16 BRPM | HEIGHT: 68 IN | SYSTOLIC BLOOD PRESSURE: 137 MMHG | TEMPERATURE: 97.6 F | DIASTOLIC BLOOD PRESSURE: 73 MMHG

## 2025-05-05 DIAGNOSIS — R06.00 DYSPNEA, UNSPECIFIED: ICD-10-CM

## 2025-05-05 DIAGNOSIS — R06.09 OTHER FORMS OF DYSPNEA: ICD-10-CM

## 2025-05-05 DIAGNOSIS — R06.02 SHORTNESS OF BREATH: ICD-10-CM

## 2025-05-05 PROCEDURE — 94060 EVALUATION OF WHEEZING: CPT

## 2025-05-05 PROCEDURE — 94729 DIFFUSING CAPACITY: CPT

## 2025-05-05 PROCEDURE — ZZZZZ: CPT

## 2025-05-05 PROCEDURE — 99213 OFFICE O/P EST LOW 20 MIN: CPT | Mod: 25

## 2025-05-05 PROCEDURE — 94727 GAS DIL/WSHOT DETER LNG VOL: CPT

## 2025-05-08 ENCOUNTER — APPOINTMENT (OUTPATIENT)
Dept: INTERNAL MEDICINE | Facility: CLINIC | Age: 85
End: 2025-05-08
Payer: MEDICARE

## 2025-05-08 VITALS
BODY MASS INDEX: 22.43 KG/M2 | HEART RATE: 60 BPM | HEIGHT: 68 IN | TEMPERATURE: 97.3 F | SYSTOLIC BLOOD PRESSURE: 124 MMHG | DIASTOLIC BLOOD PRESSURE: 80 MMHG | OXYGEN SATURATION: 99 % | WEIGHT: 148 LBS

## 2025-05-08 DIAGNOSIS — E11.9 TYPE 2 DIABETES MELLITUS W/OUT COMPLICATIONS: ICD-10-CM

## 2025-05-08 DIAGNOSIS — I25.10 ATHEROSCLEROTIC HEART DISEASE OF NATIVE CORONARY ARTERY W/OUT ANGINA PECTORIS: ICD-10-CM

## 2025-05-08 DIAGNOSIS — R73.03 PREDIABETES.: ICD-10-CM

## 2025-05-08 DIAGNOSIS — I10 ESSENTIAL (PRIMARY) HYPERTENSION: ICD-10-CM

## 2025-05-08 DIAGNOSIS — D47.3 ESSENTIAL (HEMORRHAGIC) THROMBOCYTHEMIA: ICD-10-CM

## 2025-05-08 DIAGNOSIS — N18.31 CHRONIC KIDNEY DISEASE, STAGE 3A: ICD-10-CM

## 2025-05-08 PROCEDURE — G2211 COMPLEX E/M VISIT ADD ON: CPT

## 2025-05-08 PROCEDURE — 99214 OFFICE O/P EST MOD 30 MIN: CPT

## 2025-05-13 ENCOUNTER — APPOINTMENT (OUTPATIENT)
Dept: CARDIOLOGY | Facility: CLINIC | Age: 85
End: 2025-05-13
Payer: MEDICARE

## 2025-05-13 ENCOUNTER — NON-APPOINTMENT (OUTPATIENT)
Age: 85
End: 2025-05-13

## 2025-05-13 VITALS
DIASTOLIC BLOOD PRESSURE: 66 MMHG | HEIGHT: 68 IN | BODY MASS INDEX: 22.73 KG/M2 | HEART RATE: 57 BPM | SYSTOLIC BLOOD PRESSURE: 120 MMHG | OXYGEN SATURATION: 100 % | WEIGHT: 150 LBS

## 2025-05-13 PROCEDURE — 93000 ELECTROCARDIOGRAM COMPLETE: CPT | Mod: NC

## 2025-05-13 PROCEDURE — G2211 COMPLEX E/M VISIT ADD ON: CPT

## 2025-05-13 PROCEDURE — 99214 OFFICE O/P EST MOD 30 MIN: CPT

## 2025-05-19 ENCOUNTER — OFFICE (OUTPATIENT)
Dept: URBAN - METROPOLITAN AREA CLINIC 102 | Facility: CLINIC | Age: 85
Setting detail: OPHTHALMOLOGY
End: 2025-05-19
Payer: MEDICARE

## 2025-05-19 DIAGNOSIS — H25.12: ICD-10-CM

## 2025-05-19 DIAGNOSIS — H40.1121: ICD-10-CM

## 2025-05-19 DIAGNOSIS — H25.13: ICD-10-CM

## 2025-05-19 DIAGNOSIS — H35.3112: ICD-10-CM

## 2025-05-19 DIAGNOSIS — H40.1112: ICD-10-CM

## 2025-05-19 PROBLEM — H47.323 DRUSEN OF OPTIC DISC; BOTH EYES: Status: ACTIVE | Noted: 2025-05-19

## 2025-05-19 PROCEDURE — 92136 OPHTHALMIC BIOMETRY: CPT | Mod: 26,LT | Performed by: OPHTHALMOLOGY

## 2025-05-19 PROCEDURE — 99213 OFFICE O/P EST LOW 20 MIN: CPT | Performed by: OPHTHALMOLOGY

## 2025-05-19 PROCEDURE — 92136 OPHTHALMIC BIOMETRY: CPT | Mod: TC | Performed by: OPHTHALMOLOGY

## 2025-05-19 ASSESSMENT — KERATOMETRY
OS_K1POWER_DIOPTERS: 44.00
OS_K2POWER_DIOPTERS: 45.50
OD_AXISANGLE_DEGREES: 177
OD_K2POWER_DIOPTERS: 45.00
OD_CYLAXISANGLE_DEGREES: 177
OS_K1POWER_DIOPTERS: 44.00
METHOD_AUTO_MANUAL: AUTO
OD_K1POWER_DIOPTERS: 43.25
OD_CYLPOWER_DEGREES: 1.75
OS_CYLPOWER_DEGREES: 1.5
OD_K2POWER_DIOPTERS: 45.00
OS_AXISANGLE_DEGREES: 85
OD_AXISANGLE_DEGREES: 87
OS_AXISANGLE_DEGREES: 175
OD_K1POWER_DIOPTERS: 43.25
OD_AXISANGLE2_DEGREES: 177
OS_CYLAXISANGLE_DEGREES: 175
OS_AXISANGLE2_DEGREES: 175
OS_K2POWER_DIOPTERS: 45.50
OD_K1K2_AVERAGE: 44.125
OS_K1K2_AVERAGE: 44.75

## 2025-05-19 ASSESSMENT — REFRACTION_CURRENTRX
OD_VPRISM_DIRECTION: BF
OS_CYLINDER: -1.25
OD_AXIS: 088
OS_SPHERE: +3.25
OD_OVR_VA: 20/
OD_ADD: +2.75
OS_OVR_VA: 20/
OS_VPRISM_DIRECTION: BF
OS_ADD: +2.75
OD_CYLINDER: -1.25
OS_AXIS: 102
OD_SPHERE: +3.25

## 2025-05-19 ASSESSMENT — REFRACTION_AUTOREFRACTION
OS_AXIS: 069
OD_AXIS: 085
OS_CYLINDER: -0.50
OS_SPHERE: +25.50
OD_SPHERE: +3.00
OD_CYLINDER: -2.50

## 2025-05-19 ASSESSMENT — REFRACTION_MANIFEST
OS_AXIS: 090
OD_CYLINDER: -2.00
OS_SPHERE: +1.50
OS_SPHERE: +2.25
OD_VA1: 20/50-
OS_VA1: 20/40+2
OS_VA1: 20/NI
OD_AXIS: 089
OD_AXIS: 095
OD_CYLINDER: -2.50
OD_VA1: 20/NI
OS_CYLINDER: -3.50
OD_SPHERE: +3.50
OD_SPHERE: +3.00
OU_VA: 20/25
OS_CYLINDER: -3.25
OS_AXIS: 088

## 2025-05-19 ASSESSMENT — CONFRONTATIONAL VISUAL FIELD TEST (CVF)
OS_FINDINGS: FULL
OD_FINDINGS: FULL

## 2025-05-19 ASSESSMENT — DECREASING TEAR LAKE - SEVERITY SCORE
OS_DEC_TEARLAKE: T
OD_DEC_TEARLAKE: T

## 2025-05-19 ASSESSMENT — TONOMETRY
OD_IOP_MMHG: 14
OS_IOP_MMHG: 13

## 2025-05-19 ASSESSMENT — VISUAL ACUITY
OS_BCVA: 20/70+2
OD_BCVA: 20/150-1

## 2025-06-11 ENCOUNTER — AMBULATORY SURGERY (OUTPATIENT)
Dept: URBAN - METROPOLITAN AREA SURGERY 14 | Facility: SURGERY | Age: 85
Setting detail: OPHTHALMOLOGY
End: 2025-06-11
Payer: MEDICARE

## 2025-06-11 DIAGNOSIS — H25.12: ICD-10-CM

## 2025-06-11 DIAGNOSIS — H21.9: ICD-10-CM

## 2025-06-11 PROCEDURE — 66984 XCAPSL CTRC RMVL W/O ECP: CPT | Mod: LT | Performed by: OPHTHALMOLOGY

## 2025-06-11 ASSESSMENT — DECREASING TEAR LAKE - SEVERITY SCORE
OD_DEC_TEARLAKE: T
OS_DEC_TEARLAKE: T

## 2025-06-11 ASSESSMENT — CORNEAL EDEMA CLINICAL DESCRIPTION: OS_CORNEALEDEMA: T

## 2025-06-12 ENCOUNTER — RX ONLY (RX ONLY)
Age: 85
End: 2025-06-12

## 2025-06-12 ENCOUNTER — TELEHEALTH-OTHER THEN HOME (OUTPATIENT)
Dept: URBAN - METROPOLITAN AREA CLINIC 64 | Facility: CLINIC | Age: 85
Setting detail: OPHTHALMOLOGY
End: 2025-06-12
Payer: MEDICARE

## 2025-06-12 DIAGNOSIS — Z96.1: ICD-10-CM

## 2025-06-12 PROCEDURE — 99024 POSTOP FOLLOW-UP VISIT: CPT | Performed by: OPHTHALMOLOGY

## 2025-06-12 ASSESSMENT — REFRACTION_MANIFEST
OS_CYLINDER: -3.25
OS_SPHERE: +1.50
OD_CYLINDER: -2.00
OS_SPHERE: +2.25
OD_SPHERE: +3.50
OD_AXIS: 089
OD_AXIS: 095
OS_AXIS: 088
OD_VA1: 20/50-
OD_VA1: 20/NI
OS_CYLINDER: -3.50
OD_SPHERE: +3.00
OS_VA1: 20/40+2
OS_VA1: 20/NI
OS_AXIS: 090
OU_VA: 20/25
OD_CYLINDER: -2.50

## 2025-06-12 ASSESSMENT — VISUAL ACUITY
OS_BCVA: 20/50-1
OD_BCVA: 20/20-2

## 2025-06-12 ASSESSMENT — KERATOMETRY
OS_AXISANGLE_DEGREES: 167
OS_K1POWER_DIOPTERS: 44.50
OD_K1POWER_DIOPTERS: 43.50
METHOD_AUTO_MANUAL: AUTO
OD_AXISANGLE_DEGREES: 180
OD_K2POWER_DIOPTERS: 45.25
OS_K2POWER_DIOPTERS: 45.50

## 2025-06-12 ASSESSMENT — TONOMETRY
OS_IOP_MMHG: 15
OD_IOP_MMHG: 10

## 2025-06-12 ASSESSMENT — REFRACTION_CURRENTRX
OS_ADD: +2.75
OS_AXIS: 102
OD_ADD: +2.75
OD_AXIS: 088
OS_SPHERE: +3.25
OD_CYLINDER: -1.25
OS_CYLINDER: -1.25
OS_VPRISM_DIRECTION: BF
OD_SPHERE: +3.25
OD_VPRISM_DIRECTION: BF
OS_OVR_VA: 20/
OD_OVR_VA: 20/

## 2025-06-12 ASSESSMENT — REFRACTION_AUTOREFRACTION
OS_CYLINDER: -1.25
OS_AXIS: 085
OS_SPHERE: +1.00
OD_CYLINDER: -2.00
OD_AXIS: 093
OD_SPHERE: +2.50

## 2025-06-12 ASSESSMENT — CONFRONTATIONAL VISUAL FIELD TEST (CVF)
OS_FINDINGS: FULL
OD_FINDINGS: FULL

## 2025-06-16 ENCOUNTER — APPOINTMENT (OUTPATIENT)
Facility: CLINIC | Age: 85
End: 2025-06-16
Payer: MEDICARE

## 2025-06-16 VITALS — HEIGHT: 68 IN | WEIGHT: 150 LBS | BODY MASS INDEX: 22.73 KG/M2

## 2025-06-16 PROBLEM — S83.92XS SPRAIN OF LEFT KNEE, UNSPECIFIED LIGAMENT, SEQUELA: Status: ACTIVE | Noted: 2025-06-16

## 2025-06-16 PROBLEM — M16.12 PRIMARY OSTEOARTHRITIS OF LEFT HIP: Status: ACTIVE | Noted: 2025-06-16

## 2025-06-16 PROBLEM — M25.562 ACUTE PAIN OF LEFT KNEE: Status: ACTIVE | Noted: 2025-06-16

## 2025-06-16 PROCEDURE — 99203 OFFICE O/P NEW LOW 30 MIN: CPT

## 2025-06-16 PROCEDURE — 73502 X-RAY EXAM HIP UNI 2-3 VIEWS: CPT

## 2025-06-16 PROCEDURE — 73564 X-RAY EXAM KNEE 4 OR MORE: CPT | Mod: LT

## 2025-06-18 ASSESSMENT — DECREASING TEAR LAKE - SEVERITY SCORE: OD_DEC_TEARLAKE: T

## 2025-06-19 ENCOUNTER — OFFICE (OUTPATIENT)
Dept: URBAN - METROPOLITAN AREA CLINIC 102 | Facility: CLINIC | Age: 85
Setting detail: OPHTHALMOLOGY
End: 2025-06-19
Payer: MEDICARE

## 2025-06-19 DIAGNOSIS — H25.11: ICD-10-CM

## 2025-06-19 PROCEDURE — 92136 OPHTHALMIC BIOMETRY: CPT | Mod: 26,RT | Performed by: OPHTHALMOLOGY

## 2025-06-19 ASSESSMENT — REFRACTION_CURRENTRX
OD_ADD: +2.75
OD_SPHERE: +3.25
OD_AXIS: 088
OS_ADD: +2.75
OD_OVR_VA: 20/
OD_VPRISM_DIRECTION: BF
OS_OVR_VA: 20/
OD_CYLINDER: -1.25
OS_CYLINDER: -1.25
OS_VPRISM_DIRECTION: BF
OS_SPHERE: +3.25
OS_AXIS: 102

## 2025-06-19 ASSESSMENT — REFRACTION_MANIFEST
OU_VA: 20/25
OD_SPHERE: +3.50
OS_VA1: 20/40+2
OD_AXIS: 089
OS_CYLINDER: -3.50
OD_VA1: 20/50-
OD_CYLINDER: -2.50
OS_AXIS: 090
OS_SPHERE: +1.50
OS_SPHERE: +2.25
OD_VA1: 20/NI
OD_CYLINDER: -2.00
OS_AXIS: 088
OS_CYLINDER: -3.25
OS_VA1: 20/NI
OD_AXIS: 095
OD_SPHERE: +3.00

## 2025-06-19 ASSESSMENT — VISUAL ACUITY
OS_BCVA: 20/60-2
OD_BCVA: 20/25

## 2025-06-19 ASSESSMENT — KERATOMETRY
OS_AXISANGLE_DEGREES: 172
OD_AXISANGLE_DEGREES: 180
OS_K2POWER_DIOPTERS: 45.50
METHOD_AUTO_MANUAL: AUTO
OD_K2POWER_DIOPTERS: 45.25
OD_K1POWER_DIOPTERS: 43.50
OS_K1POWER_DIOPTERS: 43.50

## 2025-06-19 ASSESSMENT — TONOMETRY
OS_IOP_MMHG: 13
OD_IOP_MMHG: 15

## 2025-06-19 ASSESSMENT — REFRACTION_AUTOREFRACTION
OS_CYLINDER: -2.00
OD_AXIS: 95
OS_SPHERE: +1.25
OD_SPHERE: +2.25
OD_CYLINDER: -2.00
OS_AXIS: 84

## 2025-06-19 ASSESSMENT — CONFRONTATIONAL VISUAL FIELD TEST (CVF)
OD_FINDINGS: FULL
OS_FINDINGS: FULL

## 2025-06-25 ENCOUNTER — AMBULATORY SURGERY (OUTPATIENT)
Dept: URBAN - METROPOLITAN AREA SURGERY 14 | Facility: SURGERY | Age: 85
Setting detail: OPHTHALMOLOGY
End: 2025-06-25
Payer: MEDICARE

## 2025-06-25 DIAGNOSIS — H25.11: ICD-10-CM

## 2025-06-25 PROCEDURE — 66984 XCAPSL CTRC RMVL W/O ECP: CPT | Mod: 79,RT | Performed by: OPHTHALMOLOGY

## 2025-06-25 ASSESSMENT — CORNEAL EDEMA CLINICAL DESCRIPTION: OD_CORNEALEDEMA: T

## 2025-06-25 ASSESSMENT — DECREASING TEAR LAKE - SEVERITY SCORE: OD_DEC_TEARLAKE: T

## 2025-06-26 ENCOUNTER — RX ONLY (RX ONLY)
Age: 85
End: 2025-06-26

## 2025-06-26 ENCOUNTER — OFFICE (OUTPATIENT)
Dept: URBAN - METROPOLITAN AREA CLINIC 102 | Facility: CLINIC | Age: 85
Setting detail: OPHTHALMOLOGY
End: 2025-06-26
Payer: MEDICARE

## 2025-06-26 DIAGNOSIS — Z96.1: ICD-10-CM

## 2025-06-26 PROCEDURE — 99024 POSTOP FOLLOW-UP VISIT: CPT | Performed by: OPHTHALMOLOGY

## 2025-06-26 ASSESSMENT — KERATOMETRY
OD_AXISANGLE_DEGREES: 009
OS_K1POWER_DIOPTERS: 43.75
OS_AXISANGLE_DEGREES: 170
OD_K1POWER_DIOPTERS: 43.50
OD_K2POWER_DIOPTERS: 45.00
OS_K2POWER_DIOPTERS: 45.75

## 2025-06-26 ASSESSMENT — REFRACTION_CURRENTRX
OD_SPHERE: +3.25
OS_ADD: +2.75
OD_CYLINDER: -1.25
OS_OVR_VA: 20/
OD_AXIS: 088
OD_VPRISM_DIRECTION: BF
OS_VPRISM_DIRECTION: BF
OD_OVR_VA: 20/
OS_CYLINDER: -1.25
OS_AXIS: 102
OD_ADD: +2.75
OS_SPHERE: +3.25

## 2025-06-26 ASSESSMENT — REFRACTION_MANIFEST
OU_VA: 20/25
OD_CYLINDER: -2.50
OS_CYLINDER: -3.25
OD_AXIS: 095
OD_VA1: 20/50-
OD_VA1: 20/NI
OS_VA1: 20/40+2
OS_CYLINDER: -3.50
OD_CYLINDER: -2.00
OS_SPHERE: +2.25
OS_AXIS: 088
OS_AXIS: 090
OS_SPHERE: +1.50
OS_VA1: 20/NI
OD_AXIS: 089
OD_SPHERE: +3.50
OD_SPHERE: +3.00

## 2025-06-26 ASSESSMENT — VISUAL ACUITY
OD_BCVA: 20/25+2
OS_BCVA: 20/40-2

## 2025-06-26 ASSESSMENT — REFRACTION_AUTOREFRACTION
OS_AXIS: 083
OD_AXIS: 101
OD_CYLINDER: -1.50
OS_CYLINDER: -2.00
OD_SPHERE: +1.00
OS_SPHERE: +1.25

## 2025-06-26 ASSESSMENT — TONOMETRY
OS_IOP_MMHG: 11
OD_IOP_MMHG: 15

## 2025-06-26 ASSESSMENT — CONFRONTATIONAL VISUAL FIELD TEST (CVF)
OS_FINDINGS: FULL
OD_FINDINGS: FULL

## 2025-07-02 PROBLEM — H25.11 CATARACT SENILE NUCLEAR SCLEROSIS ;  , RIGHT EYE: Status: ACTIVE | Noted: 2025-06-11

## 2025-07-02 PROBLEM — Z96.1 PSEUDOPHAKIA-1 WEEK P/O CAT WITH IOL ; RIGHT EYE: Status: ACTIVE | Noted: 2025-06-11

## 2025-07-02 PROBLEM — H40.031 NARROW ANGLE GLAUCOMA SUSPECT ;  , RIGHT EYE: Status: ACTIVE | Noted: 2025-06-19

## 2025-07-03 ENCOUNTER — OFFICE (OUTPATIENT)
Dept: URBAN - METROPOLITAN AREA CLINIC 102 | Facility: CLINIC | Age: 85
Setting detail: OPHTHALMOLOGY
End: 2025-07-03
Payer: MEDICARE

## 2025-07-03 DIAGNOSIS — Z96.1: ICD-10-CM

## 2025-07-03 PROBLEM — H47.323 DRUSEN OF OPTIC DISC; BOTH EYES: Status: ACTIVE | Noted: 2025-07-03

## 2025-07-03 PROCEDURE — 99024 POSTOP FOLLOW-UP VISIT: CPT | Performed by: OPHTHALMOLOGY

## 2025-07-03 ASSESSMENT — REFRACTION_AUTOREFRACTION
OS_CYLINDER: -1.75
OS_SPHERE: +0.75
OS_AXIS: 085
OD_AXIS: 094
OD_CYLINDER: -1.25
OD_SPHERE: +0.25

## 2025-07-03 ASSESSMENT — REFRACTION_MANIFEST
OS_SPHERE: +2.25
OD_CYLINDER: -2.00
OD_VA1: 20/50-
OS_AXIS: 090
OD_CYLINDER: -2.50
OS_VA1: 20/NI
OS_AXIS: 088
OD_AXIS: 095
OS_CYLINDER: -3.50
OS_CYLINDER: -3.25
OD_AXIS: 089
OD_VA1: 20/NI
OS_VA1: 20/40+2
OD_SPHERE: +3.00
OS_SPHERE: +1.50
OD_SPHERE: +3.50
OU_VA: 20/25

## 2025-07-03 ASSESSMENT — KERATOMETRY
OS_K2POWER_DIOPTERS: 45.50
OD_AXISANGLE_DEGREES: 003
OS_K1POWER_DIOPTERS: 44.00
OS_AXISANGLE_DEGREES: 178
OD_K2POWER_DIOPTERS: 45.00
OD_K1POWER_DIOPTERS: 43.75

## 2025-07-03 ASSESSMENT — REFRACTION_CURRENTRX
OS_VPRISM_DIRECTION: BF
OS_OVR_VA: 20/
OD_SPHERE: +3.25
OD_CYLINDER: -1.25
OD_AXIS: 088
OS_SPHERE: +3.25
OD_ADD: +2.75
OS_CYLINDER: -1.25
OS_ADD: +2.75
OD_VPRISM_DIRECTION: BF
OD_OVR_VA: 20/
OS_AXIS: 102

## 2025-07-03 ASSESSMENT — VISUAL ACUITY
OS_BCVA: 20/30
OD_BCVA: 20/20-2

## 2025-07-03 ASSESSMENT — CONFRONTATIONAL VISUAL FIELD TEST (CVF)
OD_FINDINGS: FULL
OS_FINDINGS: FULL

## 2025-07-08 NOTE — PROVIDER CONTACT NOTE (OTHER) - BACKGROUND
Pt was informed that the visit today will be transitioned from Urgent Care to Emergency Room Care and billing. Understanding expressed, ER ACKNOWLEDGEMENT form signed, and all questions answered.    
Pt admitted for palpitations, SOB. Had a new afib dx 3 weeks ago, stent placed one week ago.

## 2025-07-21 ENCOUNTER — APPOINTMENT (OUTPATIENT)
Dept: OTOLARYNGOLOGY | Facility: CLINIC | Age: 85
End: 2025-07-21
Payer: MEDICARE

## 2025-07-21 ENCOUNTER — APPOINTMENT (OUTPATIENT)
Dept: PHARMACY | Facility: CLINIC | Age: 85
End: 2025-07-21
Payer: MEDICARE

## 2025-07-21 VITALS — WEIGHT: 150 LBS | HEIGHT: 68 IN | BODY MASS INDEX: 22.73 KG/M2

## 2025-07-21 DIAGNOSIS — H61.23 IMPACTED CERUMEN, BILATERAL: ICD-10-CM

## 2025-07-21 DIAGNOSIS — H69.93 UNSPECIFIED EUSTACHIAN TUBE DISORDER, BILATERAL: ICD-10-CM

## 2025-07-21 DIAGNOSIS — H90.3 SENSORINEURAL HEARING LOSS, BILATERAL: ICD-10-CM

## 2025-07-21 PROCEDURE — 92557 COMPREHENSIVE HEARING TEST: CPT

## 2025-07-21 PROCEDURE — 99213 OFFICE O/P EST LOW 20 MIN: CPT | Mod: 25

## 2025-07-21 PROCEDURE — 92567 TYMPANOMETRY: CPT

## 2025-07-21 PROCEDURE — V5010 ASSESSMENT FOR HEARING AID: CPT | Mod: NC

## 2025-07-21 PROCEDURE — G0268 REMOVAL OF IMPACTED WAX MD: CPT

## 2025-08-05 ENCOUNTER — APPOINTMENT (OUTPATIENT)
Dept: ELECTROPHYSIOLOGY | Facility: CLINIC | Age: 85
End: 2025-08-05

## 2025-08-05 VITALS
SYSTOLIC BLOOD PRESSURE: 131 MMHG | DIASTOLIC BLOOD PRESSURE: 72 MMHG | HEART RATE: 60 BPM | HEIGHT: 68 IN | BODY MASS INDEX: 22.73 KG/M2 | WEIGHT: 150 LBS | OXYGEN SATURATION: 96 %

## 2025-08-14 ENCOUNTER — OFFICE (OUTPATIENT)
Dept: URBAN - METROPOLITAN AREA CLINIC 102 | Facility: CLINIC | Age: 85
Setting detail: OPHTHALMOLOGY
End: 2025-08-14
Payer: MEDICARE

## 2025-08-14 DIAGNOSIS — Z96.1: ICD-10-CM

## 2025-08-14 PROBLEM — H47.323 DRUSEN OF OPTIC DISC; BOTH EYES: Status: ACTIVE | Noted: 2025-08-14

## 2025-08-14 PROCEDURE — 99024 POSTOP FOLLOW-UP VISIT: CPT | Performed by: OPTOMETRIST

## 2025-08-14 ASSESSMENT — TONOMETRY
OS_IOP_MMHG: 10
OD_IOP_MMHG: 14

## 2025-08-14 ASSESSMENT — REFRACTION_CURRENTRX
OD_OVR_VA: 20/
OS_SPHERE: +3.25
OS_CYLINDER: -1.25
OS_AXIS: 102
OD_CYLINDER: -1.25
OD_ADD: +2.75
OS_VPRISM_DIRECTION: BF
OS_OVR_VA: 20/
OS_ADD: +2.75
OD_AXIS: 088
OD_VPRISM_DIRECTION: BF
OD_SPHERE: +3.25

## 2025-08-14 ASSESSMENT — REFRACTION_MANIFEST
OD_SPHERE: +3.00
OD_CYLINDER: -2.00
OU_VA: 20/25
OD_VA1: 20/NI
OD_SPHERE: +3.50
OS_AXIS: 090
OS_VA1: 20/40+2
OS_SPHERE: +2.25
OD_VA1: 20/50-
OS_AXIS: 088
OS_VA1: 20/NI
OD_CYLINDER: -2.50
OS_CYLINDER: -3.50
OD_AXIS: 089
OS_SPHERE: +1.50
OS_CYLINDER: -3.25
OD_AXIS: 095

## 2025-08-14 ASSESSMENT — KERATOMETRY
OS_AXISANGLE_DEGREES: 172
OS_K1POWER_DIOPTERS: 44.25
OD_AXISANGLE_DEGREES: 176
OD_K1POWER_DIOPTERS: 43.75
OD_K2POWER_DIOPTERS: 45.00
OS_K2POWER_DIOPTERS: 45.50

## 2025-08-14 ASSESSMENT — REFRACTION_AUTOREFRACTION
OS_SPHERE: +1.00
OS_AXIS: 084
OD_CYLINDER: -1.50
OS_CYLINDER: -1.50
OD_AXIS: 091
OD_SPHERE: 0.00

## 2025-08-14 ASSESSMENT — SUPERFICIAL PUNCTATE KERATITIS (SPK)
OS_SPK: 2+
OD_SPK: 2+

## 2025-08-14 ASSESSMENT — CONFRONTATIONAL VISUAL FIELD TEST (CVF)
OD_FINDINGS: FULL
OS_FINDINGS: FULL

## 2025-08-14 ASSESSMENT — VISUAL ACUITY
OS_BCVA: 20/30-2
OD_BCVA: 20/20-1

## 2025-09-16 ENCOUNTER — APPOINTMENT (OUTPATIENT)
Dept: CARDIOLOGY | Facility: CLINIC | Age: 85
End: 2025-09-16
Payer: MEDICARE

## 2025-09-16 VITALS
WEIGHT: 152 LBS | OXYGEN SATURATION: 100 % | HEART RATE: 54 BPM | SYSTOLIC BLOOD PRESSURE: 112 MMHG | DIASTOLIC BLOOD PRESSURE: 60 MMHG | BODY MASS INDEX: 23.04 KG/M2 | HEIGHT: 68 IN

## 2025-09-16 PROCEDURE — 99215 OFFICE O/P EST HI 40 MIN: CPT

## 2025-09-16 PROCEDURE — G2211 COMPLEX E/M VISIT ADD ON: CPT

## 2025-09-16 RX ORDER — ASPIRIN 81 MG/1
81 TABLET, COATED ORAL DAILY
Qty: 90 | Refills: 3 | Status: ACTIVE | COMMUNITY
Start: 2025-09-16 | End: 1900-01-01